# Patient Record
Sex: FEMALE | Race: BLACK OR AFRICAN AMERICAN | Employment: OTHER | URBAN - METROPOLITAN AREA
[De-identification: names, ages, dates, MRNs, and addresses within clinical notes are randomized per-mention and may not be internally consistent; named-entity substitution may affect disease eponyms.]

---

## 2017-03-06 ENCOUNTER — OP HISTORICAL/CONVERTED ENCOUNTER (OUTPATIENT)
Dept: OTHER | Age: 72
End: 2017-03-06

## 2017-04-05 ENCOUNTER — OP HISTORICAL/CONVERTED ENCOUNTER (OUTPATIENT)
Dept: OTHER | Age: 72
End: 2017-04-05

## 2017-05-01 ENCOUNTER — OP HISTORICAL/CONVERTED ENCOUNTER (OUTPATIENT)
Dept: OTHER | Age: 72
End: 2017-05-01

## 2019-03-18 ENCOUNTER — OP HISTORICAL/CONVERTED ENCOUNTER (OUTPATIENT)
Dept: OTHER | Age: 74
End: 2019-03-18

## 2019-06-21 ENCOUNTER — OP HISTORICAL/CONVERTED ENCOUNTER (OUTPATIENT)
Dept: OTHER | Age: 74
End: 2019-06-21

## 2019-09-20 ENCOUNTER — OP HISTORICAL/CONVERTED ENCOUNTER (OUTPATIENT)
Dept: OTHER | Age: 74
End: 2019-09-20

## 2019-10-16 ENCOUNTER — OP HISTORICAL/CONVERTED ENCOUNTER (OUTPATIENT)
Dept: OTHER | Age: 74
End: 2019-10-16

## 2019-10-18 ENCOUNTER — OP HISTORICAL/CONVERTED ENCOUNTER (OUTPATIENT)
Dept: OTHER | Age: 74
End: 2019-10-18

## 2020-08-18 ENCOUNTER — TELEPHONE (OUTPATIENT)
Dept: INTERNAL MEDICINE CLINIC | Age: 75
End: 2020-08-18

## 2020-08-18 DIAGNOSIS — R92.8 ABNORMAL MAMMOGRAM: Primary | ICD-10-CM

## 2020-08-18 NOTE — TELEPHONE ENCOUNTER
[facility] called requesting an order for Diag mammogram left breast. Fax number [phone].

## 2020-08-19 DIAGNOSIS — R92.8 ABNORMAL MAMMOGRAM: Primary | ICD-10-CM

## 2020-08-27 DIAGNOSIS — R92.8 ABNORMAL MAMMOGRAM: ICD-10-CM

## 2020-08-30 NOTE — PROGRESS NOTES
This result is abnormal.  Notify the patient mammography suggest a possible abnormality left breast calcifications. Unclear whether it is benign or not.   Radiologist feels she needs biopsy we can arrange that if she is agreeable for us to do that

## 2020-09-21 ENCOUNTER — OFFICE VISIT (OUTPATIENT)
Dept: INTERNAL MEDICINE CLINIC | Age: 75
End: 2020-09-21
Payer: MEDICARE

## 2020-09-21 VITALS
SYSTOLIC BLOOD PRESSURE: 153 MMHG | DIASTOLIC BLOOD PRESSURE: 88 MMHG | TEMPERATURE: 97.9 F | HEIGHT: 65 IN | HEART RATE: 78 BPM | OXYGEN SATURATION: 97 % | BODY MASS INDEX: 29.29 KG/M2 | WEIGHT: 175.8 LBS

## 2020-09-21 DIAGNOSIS — L92.8 GRANULOMA OF HAIR FOLLICLE: Primary | ICD-10-CM

## 2020-09-21 PROCEDURE — 1101F PT FALLS ASSESS-DOCD LE1/YR: CPT | Performed by: INTERNAL MEDICINE

## 2020-09-21 PROCEDURE — G8510 SCR DEP NEG, NO PLAN REQD: HCPCS | Performed by: INTERNAL MEDICINE

## 2020-09-21 PROCEDURE — 3017F COLORECTAL CA SCREEN DOC REV: CPT | Performed by: INTERNAL MEDICINE

## 2020-09-21 PROCEDURE — G8427 DOCREV CUR MEDS BY ELIG CLIN: HCPCS | Performed by: INTERNAL MEDICINE

## 2020-09-21 PROCEDURE — 99213 OFFICE O/P EST LOW 20 MIN: CPT | Performed by: INTERNAL MEDICINE

## 2020-09-21 PROCEDURE — 1090F PRES/ABSN URINE INCON ASSESS: CPT | Performed by: INTERNAL MEDICINE

## 2020-09-21 PROCEDURE — G9899 SCRN MAM PERF RSLTS DOC: HCPCS | Performed by: INTERNAL MEDICINE

## 2020-09-21 PROCEDURE — G0444 DEPRESSION SCREEN ANNUAL: HCPCS | Performed by: INTERNAL MEDICINE

## 2020-09-21 PROCEDURE — G8536 NO DOC ELDER MAL SCRN: HCPCS | Performed by: INTERNAL MEDICINE

## 2020-09-21 PROCEDURE — G8419 CALC BMI OUT NRM PARAM NOF/U: HCPCS | Performed by: INTERNAL MEDICINE

## 2020-09-21 PROCEDURE — G8400 PT W/DXA NO RESULTS DOC: HCPCS | Performed by: INTERNAL MEDICINE

## 2020-09-21 RX ORDER — MONTELUKAST SODIUM 10 MG/1
10 TABLET ORAL DAILY
COMMUNITY
End: 2021-08-27

## 2020-09-21 RX ORDER — BISMUTH SUBSALICYLATE 262 MG
1 TABLET,CHEWABLE ORAL DAILY
COMMUNITY
End: 2020-10-15

## 2020-09-21 NOTE — PROGRESS NOTES
Chief Complaint   Patient presents with    Wound Check     Pt states that she needs an area to be checked on  Her right lower arm

## 2020-09-21 NOTE — PROGRESS NOTES
Yanely Terrazas is a 69 y.o. female and presents with Wound Check    She says she noticed a small pinpoint lesion in her right arm months ago and they started trying to pull it out 2 weeks ago, and it started growing and becoming injury. She denies any secretion, denies changes in the color of the skin. Review of Systems  Review of Systems   Constitutional: Negative for chills, fatigue, fever and unexpected weight change. HENT: Negative for congestion, ear pain, sneezing and sore throat. Eyes: Negative for pain and discharge. Respiratory: Negative for cough, shortness of breath and wheezing. Cardiovascular: Negative for chest pain, palpitations and leg swelling. Gastrointestinal: Negative for abdominal pain, blood in stool, constipation and diarrhea. Endocrine: Negative for polydipsia and polyuria. Genitourinary: Negative for difficulty urinating, dysuria, frequency, hematuria and urgency. Musculoskeletal: Negative for arthralgias, back pain and joint swelling. Skin: Negative for rash. Allergic/Immunologic: Negative for environmental allergies and food allergies. Neurological: Negative for dizziness, speech difficulty, weakness, light-headedness, numbness and headaches. Hematological: Negative for adenopathy. Psychiatric/Behavioral: Negative for behavioral problems (Depression), sleep disturbance and suicidal ideas.           Past Medical History:   Diagnosis Date    Arthritis      Past Surgical History:   Procedure Laterality Date    HX GYN      hysterectomy    HX ORTHOPAEDIC      knee     Social History     Socioeconomic History    Marital status:      Spouse name: Not on file    Number of children: Not on file    Years of education: Not on file    Highest education level: Not on file   Tobacco Use    Smoking status: Never Smoker    Smokeless tobacco: Never Used   Substance and Sexual Activity    Alcohol use: Never     Frequency: Never    Drug use: Never     Family History   Problem Relation Age of Onset    Cancer Mother     Cancer Father      Current Outpatient Medications   Medication Sig Dispense Refill    multivitamin (ONE A DAY) tablet Take 1 Tab by mouth daily.  montelukast (SINGULAIR) 10 mg tablet Take 10 mg by mouth daily. Not on File    Objective:  Visit Vitals  BP (!) 153/88 (BP 1 Location: Left arm, BP Patient Position: Sitting)   Pulse 78   Temp 97.9 °F (36.6 °C) (Oral)   Ht 5' 5\" (1.651 m)   Wt 175 lb 12.8 oz (79.7 kg)   SpO2 97% Comment: RA   BMI 29.25 kg/m²     Physical Exam:   Physical Exam  Constitutional:       General: She is not in acute distress. Appearance: Normal appearance. HENT:      Head: Normocephalic and atraumatic. Mouth/Throat:      Mouth: Mucous membranes are moist.   Eyes:      Extraocular Movements: Extraocular movements intact. Conjunctiva/sclera: Conjunctivae normal.      Pupils: Pupils are equal, round, and reactive to light. Neck:      Musculoskeletal: Normal range of motion and neck supple. Cardiovascular:      Rate and Rhythm: Normal rate and regular rhythm. Heart sounds: Normal heart sounds. Pulmonary:      Effort: Pulmonary effort is normal.      Breath sounds: Normal breath sounds. Musculoskeletal:         General: No swelling or deformity. Right lower leg: No edema. Left lower leg: No edema. Lymphadenopathy:      Cervical: No cervical adenopathy. Skin:     General: Skin is warm and dry. Capillary Refill: Capillary refill takes less than 2 seconds. Coloration: Skin is not jaundiced or pale. Findings: Lesion (Indurated papule in the right forearm of around 1 cm diameter, eupigmented) present. No erythema or rash. Neurological:      General: No focal deficit present. Mental Status: She is alert and oriented to person, place, and time. Psychiatric:         Mood and Affect: Mood normal.         Behavior: Behavior normal.         Thought Content:  Thought content normal.         Judgment: Judgment normal.          No results found for this or any previous visit. Assessment/Plan:    The lesion she has looks like a granuloma, I explained her disease scar tissue forming, chances that that granuloma will stay there or it could going its own after a few weeks, I gave her the option to see dermatology to see if she can have it removed if it's bothering her. ICD-10-CM ICD-9-CM    1. Granuloma of hair follicle  J57.1 910.4 REFERRAL TO DERMATOLOGY     Orders Placed This Encounter    REFERRAL TO DERMATOLOGY     Referral Priority:   Routine     Referral Type:   Consultation     Referral Reason:   Specialty Services Required     Referred to Provider:   Rupert Joseph DO     Requested Specialty:   Dermatology     Number of Visits Requested:   1    multivitamin (ONE A DAY) tablet     Sig: Take 1 Tab by mouth daily.  montelukast (SINGULAIR) 10 mg tablet     Sig: Take 10 mg by mouth daily. There are no Patient Instructions on file for this visit.

## 2020-09-25 ENCOUNTER — OFFICE VISIT (OUTPATIENT)
Dept: SURGERY | Age: 75
End: 2020-09-25
Payer: MEDICARE

## 2020-09-25 VITALS
HEIGHT: 65 IN | TEMPERATURE: 97.5 F | RESPIRATION RATE: 16 BRPM | WEIGHT: 177.6 LBS | DIASTOLIC BLOOD PRESSURE: 83 MMHG | BODY MASS INDEX: 29.59 KG/M2 | SYSTOLIC BLOOD PRESSURE: 160 MMHG | HEART RATE: 87 BPM

## 2020-09-25 DIAGNOSIS — R92.8 ABNORMAL MAMMOGRAPHY: Primary | ICD-10-CM

## 2020-09-25 PROCEDURE — 99204 OFFICE O/P NEW MOD 45 MIN: CPT | Performed by: SURGERY

## 2020-09-25 RX ORDER — CALCIUM/MAGNESIUM/ZINC 333-133 MG
400 TABLET ORAL DAILY
COMMUNITY

## 2020-09-25 RX ORDER — CALCIUM CARBONATE/VITAMIN D3 600 MG-125
600 TABLET ORAL DAILY
COMMUNITY

## 2020-09-25 RX ORDER — ZINC GLUCONATE 10 MG
250 LOZENGE ORAL DAILY
COMMUNITY

## 2020-09-25 NOTE — PROGRESS NOTES
1. Have you been to the ER, urgent care clinic since your last visit? Hospitalized since your last visit? No    2. Have you seen or consulted any other health care providers outside of the [address] [place] [address] since your last visit? Include any pap smears or colon screening.  No         New patient with previous stereotactic biopsy left breast.

## 2020-09-25 NOTE — LETTER
9/26/20 Patient: Eual Body YOB: 1945 Date of Visit: 9/25/2020 Jovany Hathaway MD 
[address] [facility] [phone] VIA In Basket Dear Jovany Hathaway MD, Thank you for referring Ms. Dianelys Miles [facility] [address] [facility]. My notes for this consultation are attached. If you have questions, please do not hesitate to call me. I look forward to following your patient along with you. Sincerely, Angela Morris MD

## 2020-09-26 NOTE — PROGRESS NOTES
This is the first [address] [place] [address] visit for this 69y.o.-year-old woman who presents with left abnormal mammogram.      HISTORY OF PRESENT ILLNESS: Ms Chel Middleton is a 69y.o.-year-old woman who presents for left abnormal mammogram. Underwent left breast biopsy demonstrating papillomatosis, fibrocystic change and fibroadenoma. She denies any palpable masses, nipple discharge, skin changes, or axillary adenopathy. She denies breast pain. She does not have any significant past history for breast diseases or breast biopsy. Breast cancer risk factors:  Menarche at age 16    Age at first live birth: 23  postmenopausal.  OCP use: admits to  HRT use: admits to  Infertility treatment:  denies    FAMILY HISTORY:  Maternal aunt w breast cancer in her 46s  Maternal aunt w breast cancer in her 46s  Paternal cousin w ? Gynecological cancer  Maternal cousin w prostate cancer    Past Medical History:   Diagnosis Date    Arthritis          Past Surgical History:   Procedure Laterality Date    HX GYN      hysterectomy    HX ORTHOPAEDIC      knee         Social History     Socioeconomic History    Marital status:      Spouse name: Not on file    Number of children: Not on file    Years of education: Not on file    Highest education level: Not on file   Tobacco Use    Smoking status: Never Smoker    Smokeless tobacco: Never Used   Substance and Sexual Activity    Alcohol use: Never     Frequency: Never    Drug use: Never         Not on File      Current Outpatient Medications   Medication Sig Dispense Refill    calcium-cholecalciferol, d3, (CALCIUM 600 + D) 600-125 mg-unit tab Take  by mouth.  echinacea 400 mg cap Take  by mouth.  magnesium 250 mg tab Take  by mouth.  multivitamin (ONE A DAY) tablet Take 1 Tab by mouth daily.  montelukast (SINGULAIR) 10 mg tablet Take 10 mg by mouth daily.            REVIEW OF SYSTEMS:  General: normal, no unintentional weight loss  HEENT: normal, no lymphadenopathy  Cardiovascular: normal, no chest pain  Respiratory: no cough, shortness of breath, or wheezing  BREAST: no complaints  GI: normal, no blood in urine or stool  : normal, no hematuria  Musculoskeletal: no back pain  Integumentary: no abnormal skin lesions  Neuro: no memory changes, dizziness, loss of consciousness  Psych: no mood disorders, feels safe in a relationship      PHYSICAL EXAM:  Patient is a 69y.o.-year-old woman  General Appearance: healthy-appearing, no acute distress, ambulating normally  Head: normocephalic  Neck: supple and no masses  Lymph Nodes: no cervical LAD, supraclavicular LAD, or axillary LAD    BREASTS: symmetric  RIGHT BREAST: no erythema, induration, tenderness, ecchymosis, skin changes, abnormal secretions, or axillary lymphadenopathy and normal nipple appearance, no masses  LEFT BREAST: no erythema, induration, tenderness, ecchymosis, skin changes, abnormal secretions, or axillary lymphadenopathy and normal nipple appearance, no masses    Lungs: no dyspnea  Back: normal curvature  Abdomen: soft and no tenderness, no hepatomegaly, no splenomegaly  Skin: no jaundice  Musculoskeletal: Extremities w no edema, normal motor strength, normal movement of all extremities  Neurologic: Cranial Nerves: grossly intact. Sensation: grossly intact  Psychiatric: good judgement and insight, active and alert and normal mood      RADIOLOGIC WORK-UP: Radiology films and reports were reviewed.  8.11.2020, heterogeneously dense breasts, cluster of calcifications in the left breast, BIRADS-4      PATHOLOGY: Review of pathology shows 8.26.2020, left breast stereotactic biopsy w intraductal papillomatosis w microcalcifications, fibrocystic change, fibroadenoma      IMPRESSION:  69y.o.-year old woman with left abnormal mammogram    DISCUSSION AND PLAN:  I discussed the results of the physical examination and imaging and biopsy results with the patient and explained that the findings are suspicious, are currently of unknown etiology with unknown diagnosis, with an approximately 10% chance of upgrade to an invasive cancer, and that she would need further work-up, which includes an excisional biopsy to obtain pathology. She understands and will discuss this w her . She will call us to inform us of her decision for surgery or observation      She was advised to call the office w any questions or concerns. All questions were answered to her satisfaction. This encounter lasted 45 minutes, and over 50% of this visit was spent in counseling the patient and coordination of care.

## 2020-09-28 ENCOUNTER — TELEPHONE (OUTPATIENT)
Dept: SURGERY | Age: 75
End: 2020-09-28

## 2020-10-16 ENCOUNTER — TRANSCRIBE ORDER (OUTPATIENT)
Dept: SCHEDULING | Age: 75
End: 2020-10-16

## 2020-10-16 DIAGNOSIS — R92.8 ABNORMAL MAMMOGRAM: Primary | ICD-10-CM

## 2020-10-24 ENCOUNTER — HOSPITAL ENCOUNTER (OUTPATIENT)
Dept: PREADMISSION TESTING | Age: 75
Discharge: HOME | End: 2020-10-24
Payer: MEDICARE

## 2020-10-24 LAB — SARS-COV-2, COV2: NORMAL

## 2020-10-24 PROCEDURE — 87635 SARS-COV-2 COVID-19 AMP PRB: CPT

## 2020-10-26 LAB — SARS-COV-2, COV2NT: NOT DETECTED

## 2020-10-28 ENCOUNTER — HOSPITAL ENCOUNTER (OUTPATIENT)
Age: 75
Discharge: HOME | End: 2020-10-28
Attending: SURGERY | Admitting: SURGERY
Payer: MEDICARE

## 2020-10-28 ENCOUNTER — ANESTHESIA (OUTPATIENT)
Dept: SURGERY | Age: 75
End: 2020-10-28
Payer: MEDICARE

## 2020-10-28 ENCOUNTER — HOSPITAL ENCOUNTER (OUTPATIENT)
Dept: MAMMOGRAPHY | Age: 75
Discharge: HOME | End: 2020-10-28
Attending: SURGERY
Payer: MEDICARE

## 2020-10-28 ENCOUNTER — ANESTHESIA EVENT (OUTPATIENT)
Dept: SURGERY | Age: 75
End: 2020-10-28
Payer: MEDICARE

## 2020-10-28 VITALS
DIASTOLIC BLOOD PRESSURE: 69 MMHG | RESPIRATION RATE: 18 BRPM | HEART RATE: 89 BPM | WEIGHT: 173 LBS | TEMPERATURE: 97.4 F | BODY MASS INDEX: 28.82 KG/M2 | HEIGHT: 65 IN | OXYGEN SATURATION: 99 % | SYSTOLIC BLOOD PRESSURE: 144 MMHG

## 2020-10-28 DIAGNOSIS — R92.8 ABNORMAL MAMMOGRAM: ICD-10-CM

## 2020-10-28 DIAGNOSIS — R92.8 ABNORMAL MAMMOGRAM: Primary | ICD-10-CM

## 2020-10-28 PROCEDURE — 76210000020 HC REC RM PH II FIRST 0.5 HR: Performed by: SURGERY

## 2020-10-28 PROCEDURE — 76010000138 HC OR TIME 0.5 TO 1 HR: Performed by: SURGERY

## 2020-10-28 PROCEDURE — 77030010938 HC CLP LIG TELE -A: Performed by: SURGERY

## 2020-10-28 PROCEDURE — 77030002996 HC SUT SLK J&J -A: Performed by: SURGERY

## 2020-10-28 PROCEDURE — 76060000032 HC ANESTHESIA 0.5 TO 1 HR: Performed by: SURGERY

## 2020-10-28 PROCEDURE — 77030010509 HC AIRWY LMA MSK TELE -A: Performed by: NURSE ANESTHETIST, CERTIFIED REGISTERED

## 2020-10-28 PROCEDURE — 88307 TISSUE EXAM BY PATHOLOGIST: CPT

## 2020-10-28 PROCEDURE — 77030031139 HC SUT VCRL2 J&J -A: Performed by: SURGERY

## 2020-10-28 PROCEDURE — 76210000006 HC OR PH I REC 0.5 TO 1 HR: Performed by: SURGERY

## 2020-10-28 PROCEDURE — 74011000250 HC RX REV CODE- 250: Performed by: NURSE ANESTHETIST, CERTIFIED REGISTERED

## 2020-10-28 PROCEDURE — 2709999900 HC NON-CHARGEABLE SUPPLY: Performed by: SURGERY

## 2020-10-28 PROCEDURE — 74011000250 HC RX REV CODE- 250: Performed by: SURGERY

## 2020-10-28 PROCEDURE — 19120 REMOVAL OF BREAST LESION: CPT | Performed by: SURGERY

## 2020-10-28 PROCEDURE — 74011250636 HC RX REV CODE- 250/636: Performed by: NURSE ANESTHETIST, CERTIFIED REGISTERED

## 2020-10-28 PROCEDURE — 74011000272 HC RX REV CODE- 272: Performed by: SURGERY

## 2020-10-28 PROCEDURE — 74011250636 HC RX REV CODE- 250/636: Performed by: SURGERY

## 2020-10-28 PROCEDURE — 19281 PERQ DEVICE BREAST 1ST IMAG: CPT

## 2020-10-28 PROCEDURE — 77030018673: Performed by: SURGERY

## 2020-10-28 RX ORDER — FENTANYL CITRATE 50 UG/ML
INJECTION, SOLUTION INTRAMUSCULAR; INTRAVENOUS AS NEEDED
Status: DISCONTINUED | OUTPATIENT
Start: 2020-10-28 | End: 2020-10-28 | Stop reason: HOSPADM

## 2020-10-28 RX ORDER — SODIUM CHLORIDE 0.9 % (FLUSH) 0.9 %
5-40 SYRINGE (ML) INJECTION AS NEEDED
Status: DISCONTINUED | OUTPATIENT
Start: 2020-10-28 | End: 2020-10-28 | Stop reason: HOSPADM

## 2020-10-28 RX ORDER — LIDOCAINE HYDROCHLORIDE AND EPINEPHRINE 10; 10 MG/ML; UG/ML
INJECTION, SOLUTION INFILTRATION; PERINEURAL AS NEEDED
Status: DISCONTINUED | OUTPATIENT
Start: 2020-10-28 | End: 2020-10-28 | Stop reason: HOSPADM

## 2020-10-28 RX ORDER — EPHEDRINE SULFATE/0.9% NACL/PF 50 MG/5 ML
SYRINGE (ML) INTRAVENOUS AS NEEDED
Status: DISCONTINUED | OUTPATIENT
Start: 2020-10-28 | End: 2020-10-28 | Stop reason: HOSPADM

## 2020-10-28 RX ORDER — FENTANYL CITRATE 50 UG/ML
50 INJECTION, SOLUTION INTRAMUSCULAR; INTRAVENOUS AS NEEDED
Status: DISCONTINUED | OUTPATIENT
Start: 2020-10-28 | End: 2020-10-28 | Stop reason: HOSPADM

## 2020-10-28 RX ORDER — METOPROLOL TARTRATE 5 MG/5ML
2.5 INJECTION INTRAVENOUS
Status: DISCONTINUED | OUTPATIENT
Start: 2020-10-28 | End: 2020-10-28 | Stop reason: HOSPADM

## 2020-10-28 RX ORDER — SODIUM CHLORIDE 0.9 % (FLUSH) 0.9 %
5-40 SYRINGE (ML) INJECTION EVERY 8 HOURS
Status: DISCONTINUED | OUTPATIENT
Start: 2020-10-28 | End: 2020-10-28 | Stop reason: HOSPADM

## 2020-10-28 RX ORDER — SODIUM CHLORIDE 0.9 G/100ML
IRRIGANT IRRIGATION AS NEEDED
Status: DISCONTINUED | OUTPATIENT
Start: 2020-10-28 | End: 2020-10-28 | Stop reason: HOSPADM

## 2020-10-28 RX ORDER — MORPHINE SULFATE 10 MG/ML
2 INJECTION, SOLUTION INTRAMUSCULAR; INTRAVENOUS
Status: DISCONTINUED | OUTPATIENT
Start: 2020-10-28 | End: 2020-10-28 | Stop reason: HOSPADM

## 2020-10-28 RX ORDER — DIPHENHYDRAMINE HYDROCHLORIDE 50 MG/ML
12.5 INJECTION, SOLUTION INTRAMUSCULAR; INTRAVENOUS AS NEEDED
Status: DISCONTINUED | OUTPATIENT
Start: 2020-10-28 | End: 2020-10-28 | Stop reason: HOSPADM

## 2020-10-28 RX ORDER — ONDANSETRON 2 MG/ML
INJECTION INTRAMUSCULAR; INTRAVENOUS AS NEEDED
Status: DISCONTINUED | OUTPATIENT
Start: 2020-10-28 | End: 2020-10-28 | Stop reason: HOSPADM

## 2020-10-28 RX ORDER — FENTANYL CITRATE 50 UG/ML
50 INJECTION, SOLUTION INTRAMUSCULAR; INTRAVENOUS
Status: DISCONTINUED | OUTPATIENT
Start: 2020-10-28 | End: 2020-10-28 | Stop reason: HOSPADM

## 2020-10-28 RX ORDER — ONDANSETRON 2 MG/ML
4 INJECTION INTRAMUSCULAR; INTRAVENOUS AS NEEDED
Status: DISCONTINUED | OUTPATIENT
Start: 2020-10-28 | End: 2020-10-28 | Stop reason: HOSPADM

## 2020-10-28 RX ORDER — MIDAZOLAM HYDROCHLORIDE 1 MG/ML
1 INJECTION, SOLUTION INTRAMUSCULAR; INTRAVENOUS AS NEEDED
Status: DISCONTINUED | OUTPATIENT
Start: 2020-10-28 | End: 2020-10-28 | Stop reason: HOSPADM

## 2020-10-28 RX ORDER — BUPIVACAINE HYDROCHLORIDE 2.5 MG/ML
INJECTION, SOLUTION EPIDURAL; INFILTRATION; INTRACAUDAL AS NEEDED
Status: DISCONTINUED | OUTPATIENT
Start: 2020-10-28 | End: 2020-10-28 | Stop reason: HOSPADM

## 2020-10-28 RX ORDER — PROPOFOL 10 MG/ML
INJECTION, EMULSION INTRAVENOUS AS NEEDED
Status: DISCONTINUED | OUTPATIENT
Start: 2020-10-28 | End: 2020-10-28 | Stop reason: HOSPADM

## 2020-10-28 RX ORDER — LIDOCAINE HYDROCHLORIDE 10 MG/ML
0.1 INJECTION, SOLUTION EPIDURAL; INFILTRATION; INTRACAUDAL; PERINEURAL AS NEEDED
Status: DISCONTINUED | OUTPATIENT
Start: 2020-10-28 | End: 2020-10-28 | Stop reason: HOSPADM

## 2020-10-28 RX ORDER — LIDOCAINE HYDROCHLORIDE 20 MG/ML
INJECTION, SOLUTION EPIDURAL; INFILTRATION; INTRACAUDAL; PERINEURAL AS NEEDED
Status: DISCONTINUED | OUTPATIENT
Start: 2020-10-28 | End: 2020-10-28 | Stop reason: HOSPADM

## 2020-10-28 RX ORDER — CEFAZOLIN SODIUM 2 G/100ML
2 INJECTION, SOLUTION INTRAVENOUS ONCE
Status: DISCONTINUED | OUTPATIENT
Start: 2020-10-28 | End: 2020-10-28 | Stop reason: HOSPADM

## 2020-10-28 RX ORDER — CEFAZOLIN SODIUM 1 G/3ML
INJECTION, POWDER, FOR SOLUTION INTRAMUSCULAR; INTRAVENOUS AS NEEDED
Status: DISCONTINUED | OUTPATIENT
Start: 2020-10-28 | End: 2020-10-28 | Stop reason: HOSPADM

## 2020-10-28 RX ORDER — HYDRALAZINE HYDROCHLORIDE 20 MG/ML
10 INJECTION INTRAMUSCULAR; INTRAVENOUS
Status: DISCONTINUED | OUTPATIENT
Start: 2020-10-28 | End: 2020-10-28 | Stop reason: HOSPADM

## 2020-10-28 RX ORDER — HYDROMORPHONE HYDROCHLORIDE 1 MG/ML
0.4 INJECTION, SOLUTION INTRAMUSCULAR; INTRAVENOUS; SUBCUTANEOUS
Status: DISCONTINUED | OUTPATIENT
Start: 2020-10-28 | End: 2020-10-28 | Stop reason: HOSPADM

## 2020-10-28 RX ORDER — OXYCODONE HYDROCHLORIDE 5 MG/1
5 TABLET ORAL
Qty: 12 TAB | Refills: 0 | Status: SHIPPED | OUTPATIENT
Start: 2020-10-28 | End: 2020-10-31

## 2020-10-28 RX ORDER — DEXAMETHASONE SODIUM PHOSPHATE 4 MG/ML
INJECTION, SOLUTION INTRA-ARTICULAR; INTRALESIONAL; INTRAMUSCULAR; INTRAVENOUS; SOFT TISSUE AS NEEDED
Status: DISCONTINUED | OUTPATIENT
Start: 2020-10-28 | End: 2020-10-28 | Stop reason: HOSPADM

## 2020-10-28 RX ORDER — SODIUM CHLORIDE, SODIUM LACTATE, POTASSIUM CHLORIDE, CALCIUM CHLORIDE 600; 310; 30; 20 MG/100ML; MG/100ML; MG/100ML; MG/100ML
20 INJECTION, SOLUTION INTRAVENOUS CONTINUOUS
Status: DISCONTINUED | OUTPATIENT
Start: 2020-10-28 | End: 2020-10-28 | Stop reason: HOSPADM

## 2020-10-28 RX ORDER — HYDROCODONE BITARTRATE AND ACETAMINOPHEN 5; 325 MG/1; MG/1
1 TABLET ORAL AS NEEDED
Status: DISCONTINUED | OUTPATIENT
Start: 2020-10-28 | End: 2020-10-28 | Stop reason: HOSPADM

## 2020-10-28 RX ORDER — LABETALOL HCL 20 MG/4 ML
5 SYRINGE (ML) INTRAVENOUS
Status: DISCONTINUED | OUTPATIENT
Start: 2020-10-28 | End: 2020-10-28 | Stop reason: HOSPADM

## 2020-10-28 RX ORDER — EPHEDRINE SULFATE/0.9% NACL/PF 50 MG/5 ML
5 SYRINGE (ML) INTRAVENOUS AS NEEDED
Status: DISCONTINUED | OUTPATIENT
Start: 2020-10-28 | End: 2020-10-28 | Stop reason: HOSPADM

## 2020-10-28 RX ORDER — MIDAZOLAM HYDROCHLORIDE 1 MG/ML
0.5 INJECTION, SOLUTION INTRAMUSCULAR; INTRAVENOUS
Status: DISCONTINUED | OUTPATIENT
Start: 2020-10-28 | End: 2020-10-28 | Stop reason: HOSPADM

## 2020-10-28 RX ORDER — LIDOCAINE HYDROCHLORIDE AND EPINEPHRINE 10; 10 MG/ML; UG/ML
15 INJECTION, SOLUTION INFILTRATION; PERINEURAL
Status: DISPENSED | OUTPATIENT
Start: 2020-10-28 | End: 2020-10-28

## 2020-10-28 RX ADMIN — LIDOCAINE HYDROCHLORIDE 80 MG: 20 INJECTION, SOLUTION EPIDURAL; INFILTRATION; INTRACAUDAL; PERINEURAL at 11:32

## 2020-10-28 RX ADMIN — FENTANYL CITRATE 50 MCG: 50 INJECTION, SOLUTION INTRAMUSCULAR; INTRAVENOUS at 12:16

## 2020-10-28 RX ADMIN — DEXAMETHASONE SODIUM PHOSPHATE 4 MG: 4 INJECTION, SOLUTION INTRA-ARTICULAR; INTRALESIONAL; INTRAMUSCULAR; INTRAVENOUS; SOFT TISSUE at 11:38

## 2020-10-28 RX ADMIN — Medication 10 MG: at 11:44

## 2020-10-28 RX ADMIN — PROPOFOL 200 MG: 10 INJECTION, EMULSION INTRAVENOUS at 11:32

## 2020-10-28 RX ADMIN — FENTANYL CITRATE 50 MCG: 50 INJECTION, SOLUTION INTRAMUSCULAR; INTRAVENOUS at 11:32

## 2020-10-28 RX ADMIN — ONDANSETRON 4 MG: 2 INJECTION INTRAMUSCULAR; INTRAVENOUS at 11:38

## 2020-10-28 RX ADMIN — CEFAZOLIN SODIUM 2 G: 1 INJECTION, POWDER, FOR SOLUTION INTRAMUSCULAR; INTRAVENOUS at 11:39

## 2020-10-28 RX ADMIN — SODIUM CHLORIDE, POTASSIUM CHLORIDE, SODIUM LACTATE AND CALCIUM CHLORIDE: 600; 310; 30; 20 INJECTION, SOLUTION INTRAVENOUS at 11:07

## 2020-10-28 NOTE — DISCHARGE INSTRUCTIONS
Patient Education        Core Needle Breast Biopsy: About This Test  What is it? A core needle breast biopsy removes samples of breast tissue using a needle with a special tip. The samples are looked at under a microscope to check for cancer cells. Why is this test done? A core needle breast biopsy is most often done to check a lump found during a breast exam or a suspicious area found on a mammogram or other imaging. How do you prepare for the test?  If you take aspirin or some other blood thinner, ask your doctor if you should stop taking it before your test. Make sure that you understand exactly what your doctor wants you to do. These medicines increase the risk of bleeding. How is the test done? · You may sit in a chair or lie face up on a table. Or you may lie on your stomach on a table that has a hole for your breast to hang through. · When the area in your breast is numb, a small cut (incision) is made in the skin. · Using imaging, the doctor will guide the needle into the biopsy area. · The doctor will take several samples of breast tissue. This may be done with the needle or with a probe that uses a gentle vacuum to remove the samples. · A small clip is usually inserted into your breast to bry the biopsy site. · The needle is removed and pressure is put on the needle site to stop any bleeding. · A bandage is put on the needle site. How long does the test take? The test may take 15 minutes to 60 minutes, depending on how the procedure is done. What happens after the test?  · You'll be told how long it may take to get your results back. · You will probably be able to go home right away. · After a specialist looks at the biopsy sample for signs of cancer, your doctor's office will let you know the results. · If the test results aren't clear, you may have another biopsy or test.  How can you care for yourself at home? · You can go back to your usual activities right away.  But avoid heavy lifting for 24 hours. · The site may be tender for 2 or 3 days. You may also have some bruising, swelling, or slight bleeding. ? You can use an ice pack. Put ice or a cold pack on the area for 10 to 20 minutes at a time. Put a thin cloth between the ice and your skin. ? Ask your doctor if you can take an over-the-counter pain medicine, such as acetaminophen (Tylenol), ibuprofen (Advil, Motrin), or naproxen (Aleve). Be safe with medicines. Read and follow all instructions on the label. Follow-up care is a key part of your treatment and safety. Be sure to make and go to all appointments, and call your doctor if you are having problems. It's also a good idea to keep a list of the medicines you take. Ask your doctor when you can expect to have your test results. Where can you learn more? Go to http://www.gray.com/  Enter Y286 in the search box to learn more about \"Core Needle Breast Biopsy: About This Test.\"  Current as of: April 29, 2020               Content Version: 12.6  © 8662-9957 [employer]. Care instructions adapted under license by [employer] (which disclaims liability or warranty for this information). If you have questions about a medical condition or this instruction, always ask your healthcare professional. [place] [address] any warranty or liability for your use of this information. Patient Education   Patient Education        Infection After Surgery: Care Instructions  Your Care Instructions  After surgery, an infection is always possible. It doesn't mean that the surgery didn't go well. Because an infection can be serious, your doctor has taken steps to manage it. Your doctor checked the infection and cleaned it if necessary. He or she may have made an opening in the area so that the pus can drain out. You may have gauze in the cut so that the area will stay open and keep draining. You may need antibiotics.   You will need to follow up with your doctor to make sure the infection has gone away. Follow-up care is a key part of your treatment and safety. Be sure to make and go to all appointments, and call your doctor if you are having problems. It's also a good idea to know your test results and keep a list of the medicines you take. How can you care for yourself at home? · Make sure your surgeon knows that you saw a doctor about the infection. · If your doctor prescribed antibiotics, take them as directed. Do not stop taking them just because you feel better. You need to take the full course of antibiotics. · Ask your doctor if you can take an over-the-counter pain medicine, such as acetaminophen (Tylenol), ibuprofen (Advil, Motrin), or naproxen (Aleve). Be safe with medicines. Read and follow all instructions on the label. · Do not take two or more pain medicines at the same time unless the doctor told you to. Many pain medicines have acetaminophen, which is Tylenol. Too much acetaminophen (Tylenol) can be harmful. · Prop up the area on a pillow anytime you sit or lie down during the next 3 days. Try to keep it above the level of your heart. This will help reduce swelling. · Keep the skin clean and dry. · If you have a bandage, keep it clean and dry. · You may have a dressing over the cut (incision). A dressing helps the incision heal and protects it. Your doctor will tell you how to take care of this. You can expect drainage from the wound. · If your doctor told you how to care for your incision, follow your doctor's instructions. If you did not get instructions, follow this general advice:  ? Wash around the incision with clean water 2 times a day. Don't use hydrogen peroxide or alcohol, which can slow healing. When should you call for help?    Call your doctor now or seek immediate medical care if:    · You have signs that your infection is getting worse, such as:  ? Increased pain, swelling, warmth, or redness in the area.  ? Red streaks leading from the area. ? Pus draining from the wound. ? A new or higher fever. Watch closely for changes in your health, and be sure to contact your doctor if you have any problems. Where can you learn more? Go to http://www.gray.com/  Enter C340 in the search box to learn more about \"Infection After Surgery: Care Instructions. \"  Current as of: June 26, 2019               Content Version: 12.6  © 3220-2508 [employer]. Care instructions adapted under license by [employer] (which disclaims liability or warranty for this information). If you have questions about a medical condition or this instruction, always ask your healthcare professional. [address] any warranty or liability for your use of this information. Learning About Anesthesia  What is anesthesia? Anesthesia controls pain. And it keeps all your organs working normally during surgery or another kind of procedure. Anesthesia can relax you. It can also make you sleepy or forgetful. Or it may make you unconscious. It depends on what kind you get. Your anesthesia provider (anesthesiologist or nurse anesthetist) will make sure you are comfortable and safe during the procedure or surgery. There are different types of anesthesia. · Local anesthesia. This type numbs a small part of the body. Doctors use it for simple procedures. ? You get a shot in the area the doctor will work on.  ? You will feel some pressure during the procedure. ? You may stay awake. Or you may get medicine to help you relax or sleep. · Regional anesthesia. This type blocks pain to a larger area of the body. It can also help relieve pain right after surgery. And it may reduce your need for other pain medicine after surgery. There are different types. They include:  ? Peripheral nerve block. This is a shot near a specific nerve or group of nerves.  It blocks pain in the part of the body supplied by the nerve. This is often used for procedures on the hands, arms, feet, legs, or face. ? Epidural and spinal anesthesia. This is a shot near the spinal cord and the nerves around it. It blocks pain from an entire area of the body, such as the belly, hips, or legs. · General anesthesia. This type affects the brain and the whole body. You may get it through a small tube placed in a vein (IV). Or you may breathe it in. You are unconscious and will not feel pain. During the surgery, you will be comfortable. Later, you will not remember much about the surgery. What type will you have? The type of anesthesia you have depends on many things, such as:  · The type of surgery or procedure and the reason you are having it. · Test results, such as blood tests. · How worried you feel about the surgery. · Your health. Your doctor and nurses will ask you about any past surgeries. They will ask about any health problems you may have, such as diabetes, lung or heart disease, or a history of stroke. They will want to know if you take medicine, such as blood thinners. Your doctor may also ask if any family members have had any problems with anesthesia. You will talk with your anesthesia provider about your options. In many cases, you may be able to choose the type of anesthesia you have. What are the risks of anesthesia? Major side effects are not common. But all types of anesthesia have some risk. Your risk depends on your overall health. It also depends on the type of anesthesia you have and how you respond to it. Serious but rare risks include breathing problems, heart attack, stroke, and reaction to the medicine. Some health conditions increase the risk of problems. Your anesthesia provider will find out about any health problems you have that may affect your care. Your anesthesia provider will closely watch your vital signs during anesthesia and surgery.  This includes checking your blood pressure and heart rate. This may help you avoid problems from anesthesia. What can you do to prepare? You will get a list of instructions to help you prepare. Your doctor will let you know what to expect when you get to the hospital, during the surgery, and after. You will get instructions about when to stop eating and drinking. If you take medicine, you will get instructions about what you can and can't take before surgery. You will be asked to sign a consent form that says you understand the risks of anesthesia. Before you do, your anesthesia provider will talk with you about the best type for you and the risks and benefits of that type. Many people are nervous before they have anesthesia and surgery. Ask your doctor about ways to relax before surgery. These may include relaxation exercises or medicine. What can you expect after having anesthesia? Right after the surgery, you will be in the recovery room. Nurses will make sure you are comfortable. As the anesthesia wears off, you may feel some pain and discomfort from your surgery. Tell someone if you have pain. Pain medicine works better if you take it before the pain gets bad. You may feel some of the effects of anesthesia for a while. It takes time for the effects of the medicine to completely wear off. · If you had local or regional anesthesia you may feel numb and have less feeling in part of your body. It may also take a few hours for you to be able to move and control your muscles as usual.  · When you first wake up from general anesthesia, you may be confused. Or it may be hard to think clearly. This is normal.  · Don't do anything for 24 hours that requires attention to detail. This includes going to work, making important decisions, or signing any legal documents. Other common side effects of anesthesia include:  · Nausea and vomiting. This does not usually last long. It can be treated with medicine. · A slight drop in body temperature.  You may feel cold and shiver when you first wake up. · A sore throat, if you had general anesthesia. · Muscle aches or weakness. · Feeling tired. For minor surgeries, you may go home the same day. For other surgeries you may stay in the hospital. Your doctor will check on your recovery from the anesthesia. He or she will answer any questions you may have. Follow-up care is a key part of your treatment and safety. Be sure to make and go to all appointments, and call your doctor if you are having problems. It's also a good idea to know your test results and keep a list of the medicines you take. Where can you learn more? Go to http://www.gray.com/  Enter V817 in the search box to learn more about \"Learning About Anesthesia. \"  Current as of: August 22, 2019               Content Version: 12.6  © 6620-2033 [employer]. Care instructions adapted under license by [employer] (which disclaims liability or warranty for this information). If you have questions about a medical condition or this instruction, always ask your healthcare professional. [address] any warranty or liability for your use of this information.

## 2020-10-28 NOTE — OP NOTES
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 woman w left breast abnormal mammogram.  Informed consent was obtained; patient understands procedure and possible risks    Description of procedure: The patient was brought to the operating room, placed in supine position after prior wire localization. Anesthesia was induced. Her left breast was sterilely prepped and draped. Time out was performed. Site and side of surgery was verified. A curvilinear incision was made overlying the radiographic abnormality at the areolar border; carried down through the skin using sharp dissection. Electrocautery was used to dissect a cylindrical piece of tissue from around the guidewire. Specimen x-ray confirmed we had the abnormality. Hemostasis obtained. Wound irrigated. The skin was then closed using interrupted 3-0 vicryl suture and a running 5-0 subcuticular Vicryl suture and Steri-Strips and sterile dressings applied. The patient tolerated the procedure well and transferred to the PACU in stable condition. All counts were correct. I was present andscrubbed through the entire procedure.

## 2020-10-28 NOTE — ANESTHESIA PREPROCEDURE EVALUATION
Relevant Problems   No relevant active problems       Anesthetic History   No history of anesthetic complications            Review of Systems / Medical History  Patient summary reviewed, nursing notes reviewed and pertinent labs reviewed    Pulmonary  Within defined limits                 Neuro/Psych   Within defined limits           Cardiovascular  Within defined limits                     GI/Hepatic/Renal  Within defined limits              Endo/Other        Arthritis and cancer     Other Findings   Comments: Breast cacner         Past Medical History:   Diagnosis Date    Arthritis        Past Surgical History:   Procedure Laterality Date    HX BREAST BIOPSY      HX COLONOSCOPY      HX GYN      hysterectomy    HX HERNIA REPAIR      HX HERNIA REPAIR      HX ORTHOPAEDIC      knee       Current Outpatient Medications   Medication Instructions    calcium-cholecalciferol, d3, (CALCIUM 600 + D) 600-125 mg-unit tab 600 mg, Oral, DAILY    echinacea 400 mg, Oral, DAILY    magnesium 250 mg, Oral, DAILY    montelukast (SINGULAIR) 10 mg, Oral, DAILY       Current Facility-Administered Medications   Medication Dose Route Frequency    lactated Ringers infusion  20 mL/hr IntraVENous CONTINUOUS    ceFAZolin (ANCEF) 2g in 100 mL dextrose (iso-osmotic) IVPB  2 g IntraVENous ONCE       Patient Vitals for the past 24 hrs:   Temp Pulse Resp BP SpO2   10/28/20 0717 36.7 °C (98.1 °F) 69 16 (!) 147/80 99 %       No results found for: WBC, WBCLT, HGBPOC, HGB, HGBP, HCTPOC, HCT, PHCT, RBCH, PLT, MCV, HGBEXT, HCTEXT, PLTEXT  No results found for: NA, K, CL, CO2, AGAP, GLU, BUN, CREA, BUCR, GFRAA, GFRNA, CA, GFRAA  No results found for: APTT, PTP, INR, INREXT  No results found for: GLU, GLUCPOC  Physical Exam    Airway  Mallampati: I  TM Distance: 4 - 6 cm  Neck ROM: normal range of motion   Mouth opening: Normal     Cardiovascular    Rhythm: regular  Rate: normal         Dental    Dentition: Upper partial plate and Lower partial plate     Pulmonary  Breath sounds clear to auscultation               Abdominal  GI exam deferred       Other Findings            Anesthetic Plan    ASA: 3  Anesthesia type: general          Induction: Intravenous  Anesthetic plan and risks discussed with: Patient and Family

## 2020-10-28 NOTE — PERIOP NOTES
Patient returned [facility] accompanied by transporter and transported via wheelchair to [address] [place] [address] room 03. Patient alert and oriented x 4 with respirations even and unlabored on RA with no signs of distress noted.

## 2020-10-28 NOTE — ANESTHESIA POSTPROCEDURE EVALUATION
Procedure(s):  Left Breast Wire Localized Excisional Biopsy.     general    Anesthesia Post Evaluation      Multimodal analgesia: multimodal analgesia used between 6 hours prior to anesthesia start to PACU discharge  Patient location during evaluation: PACU  Patient participation: complete - patient participated  Level of consciousness: awake  Pain score: 0  Pain management: adequate  Airway patency: patent  Anesthetic complications: no  Cardiovascular status: acceptable  Respiratory status: acceptable  Hydration status: acceptable  Post anesthesia nausea and vomiting:  controlled  Final Post Anesthesia Temperature Assessment:  Normothermia (36.0-37.5 degrees C)      INITIAL Post-op Vital signs:   Vitals Value Taken Time   /71 10/28/2020 12:38 PM   Temp 36.6 °C (97.9 °F) 10/28/2020 12:23 PM   Pulse 93 10/28/2020 12:38 PM   Resp 19 10/28/2020 12:38 PM   SpO2 98 % 10/28/2020 12:38 PM

## 2020-10-29 ENCOUNTER — TELEPHONE (OUTPATIENT)
Dept: SURGERY | Age: 75
End: 2020-10-29

## 2020-10-29 NOTE — TELEPHONE ENCOUNTER
Spoke with patient this afternoon she states pain of 4/10 but is taking her pain medications and doing well. Post-op appt. was confirmed and patient will call with any concerns.

## 2020-11-05 ENCOUNTER — OFFICE VISIT (OUTPATIENT)
Dept: SURGERY | Age: 75
End: 2020-11-05
Payer: MEDICARE

## 2020-11-05 VITALS
HEIGHT: 65 IN | SYSTOLIC BLOOD PRESSURE: 135 MMHG | HEART RATE: 80 BPM | TEMPERATURE: 97.1 F | DIASTOLIC BLOOD PRESSURE: 76 MMHG | RESPIRATION RATE: 16 BRPM | WEIGHT: 174.8 LBS | BODY MASS INDEX: 29.12 KG/M2

## 2020-11-05 DIAGNOSIS — Z12.31 SCREENING MAMMOGRAM, ENCOUNTER FOR: ICD-10-CM

## 2020-11-05 DIAGNOSIS — R92.8 ABNORMAL MAMMOGRAPHY: Primary | ICD-10-CM

## 2020-11-05 PROCEDURE — 99024 POSTOP FOLLOW-UP VISIT: CPT | Performed by: SURGERY

## 2020-11-05 NOTE — LETTER
11/15/20 Patient: Jamaica Conn YOB: 1945 Date of Visit: 11/5/2020 Jovany Hathaway MD 
[address] [facility] [phone] VIA In Basket Dear Jovany Hathaway MD, Thank you for referring Ms. Luis F Rashid to [address] for evaluation. My notes for this consultation are attached. If you have questions, please do not hesitate to call me. I look forward to following your patient along with you. Sincerely, Benjie Aguilar MD

## 2020-11-05 NOTE — PROGRESS NOTES
"Dermatology Rooming Note    Shena Hartmann's goals for this visit include:   Chief Complaint   Patient presents with     Skin Check     Shena is here for skin check, concern about \"Left ear feels different .\"     Claudette Valencia, [facility]    " 1. Have you been to the ER, urgent care clinic since your last visit? Hospitalized since your last visit? No    2. Have you seen or consulted any other health care providers outside of the [address] [place] [address] since your last visit? Include any pap smears or colon screening. No       Patient S/P Left Breast Excisional Biopsy.

## 2020-11-16 NOTE — PROGRESS NOTES
DIAGNOSIS: left breast radial scar    SURGERY: left breast wire localized excisional biopsy, 10.28.2020    HPI:  Patient is doing well. Denies fevers, chills. Pain very well controlled. Denies nausea, vomiting. No problems with the incision site. No redness, drainage. PHYSICAL EXAM:  General: Alert, oriented  Breast: incision healing well. No erythema, induration, drainage, seroma  Extremities: normal range of motion      SURGICAL PATHOLOGY:  10.28.2020 Left breast benign tissue, radial scar      STATUS: 69y.o.-year old woman with left abnormal mammogram, radial scar    PLAN:  I discussed the pathology with the patient and advised her that the results are benign. There are no additional diagnostic or therapeutic interventions indicated at this time. She is doing well postprocedurally. I recommended that she continue usual care with her regular physician, and return to see me for re-evaluation at the time of her next mammogram.    She was advised to call the office w any questions or concerns. All questions were answered to her satisfaction. This encounter lasted 20 minutes, and over 50% of this visit was spent in counseling the patient and coordination of care.    __________  Menarche at age 16    Age at first live birth: 23  postmenopausal.  OCP use: admits to  HRT use: admits to  Infertility treatment:  denies     FAMILY HISTORY:  Maternal aunt w breast cancer in her 46s  Maternal aunt w breast cancer in her 46s  Paternal cousin w ?  Gynecological cancer  Maternal cousin w prostate cancer    Past Medical History:   Diagnosis Date    Arthritis          Past Surgical History:   Procedure Laterality Date    HX BREAST BIOPSY      HX BREAST BIOPSY Left 10/28/2020    Left Breast Wire Localized Excisional Biopsy performed by Mayra Omalley MD at [address] [place] [address] HX COLONOSCOPY      HX GYN      hysterectomy    HX HERNIA REPAIR      HX HERNIA REPAIR      HX ORTHOPAEDIC      knee         Social History Socioeconomic History    Marital status:      Spouse name: Not on file    Number of children: Not on file    Years of education: Not on file    Highest education level: Not on file   Tobacco Use    Smoking status: Never Smoker    Smokeless tobacco: Never Used   Substance and Sexual Activity    Alcohol use: Yes     Alcohol/week: 1.0 standard drinks     Types: 1 Glasses of wine per week     Frequency: Never    Drug use: Never         No Known Allergies      Current Outpatient Medications   Medication Sig Dispense Refill    calcium-cholecalciferol, d3, (CALCIUM 600 + D) 600-125 mg-unit tab Take 600 mg by mouth daily.  echinacea 400 mg cap Take 400 mg by mouth daily.  magnesium 250 mg tab Take 250 mg by mouth daily.  montelukast (SINGULAIR) 10 mg tablet Take 10 mg by mouth daily.

## 2021-01-03 ENCOUNTER — HOSPITAL ENCOUNTER (OUTPATIENT)
Dept: PREADMISSION TESTING | Age: 76
Discharge: HOME | End: 2021-01-03
Payer: MEDICARE

## 2021-01-03 LAB — SARS-COV-2, COV2: NORMAL

## 2021-01-03 PROCEDURE — 87635 SARS-COV-2 COVID-19 AMP PRB: CPT

## 2021-01-04 LAB — SARS-COV-2, COV2NT: NOT DETECTED

## 2021-01-07 ENCOUNTER — APPOINTMENT (OUTPATIENT)
Dept: ENDOSCOPY | Age: 76
End: 2021-01-07
Attending: INTERNAL MEDICINE
Payer: MEDICARE

## 2021-01-07 ENCOUNTER — ANESTHESIA EVENT (OUTPATIENT)
Dept: ENDOSCOPY | Age: 76
End: 2021-01-07
Payer: MEDICARE

## 2021-01-07 ENCOUNTER — HOSPITAL ENCOUNTER (OUTPATIENT)
Age: 76
Setting detail: OUTPATIENT SURGERY
Discharge: HOME | End: 2021-01-07
Attending: INTERNAL MEDICINE | Admitting: INTERNAL MEDICINE
Payer: MEDICARE

## 2021-01-07 ENCOUNTER — ANESTHESIA (OUTPATIENT)
Dept: ENDOSCOPY | Age: 76
End: 2021-01-07
Payer: MEDICARE

## 2021-01-07 VITALS
OXYGEN SATURATION: 98 % | TEMPERATURE: 97.6 F | HEART RATE: 80 BPM | SYSTOLIC BLOOD PRESSURE: 149 MMHG | WEIGHT: 169.2 LBS | RESPIRATION RATE: 18 BRPM | HEIGHT: 65 IN | BODY MASS INDEX: 28.19 KG/M2 | DIASTOLIC BLOOD PRESSURE: 65 MMHG

## 2021-01-07 PROCEDURE — 74011000250 HC RX REV CODE- 250: Performed by: NURSE ANESTHETIST, CERTIFIED REGISTERED

## 2021-01-07 PROCEDURE — 76060000032 HC ANESTHESIA 0.5 TO 1 HR: Performed by: INTERNAL MEDICINE

## 2021-01-07 PROCEDURE — 74011250636 HC RX REV CODE- 250/636: Performed by: NURSE ANESTHETIST, CERTIFIED REGISTERED

## 2021-01-07 PROCEDURE — 2709999900 HC NON-CHARGEABLE SUPPLY: Performed by: INTERNAL MEDICINE

## 2021-01-07 PROCEDURE — 76040000007: Performed by: INTERNAL MEDICINE

## 2021-01-07 PROCEDURE — 74011250636 HC RX REV CODE- 250/636: Performed by: INTERNAL MEDICINE

## 2021-01-07 PROCEDURE — 77030019957 HC CUF BLN GASTSCP OCOA -B: Performed by: INTERNAL MEDICINE

## 2021-01-07 RX ORDER — FENTANYL CITRATE 50 UG/ML
INJECTION, SOLUTION INTRAMUSCULAR; INTRAVENOUS AS NEEDED
Status: DISCONTINUED | OUTPATIENT
Start: 2021-01-07 | End: 2021-01-07 | Stop reason: HOSPADM

## 2021-01-07 RX ORDER — LIDOCAINE HCL/PF 100 MG/5ML
SYRINGE (ML) INTRAVENOUS
Status: DISCONTINUED
Start: 2021-01-07 | End: 2021-01-07 | Stop reason: HOSPADM

## 2021-01-07 RX ORDER — SODIUM CHLORIDE, SODIUM LACTATE, POTASSIUM CHLORIDE, CALCIUM CHLORIDE 600; 310; 30; 20 MG/100ML; MG/100ML; MG/100ML; MG/100ML
50 INJECTION, SOLUTION INTRAVENOUS CONTINUOUS
Status: DISCONTINUED | OUTPATIENT
Start: 2021-01-07 | End: 2021-01-07 | Stop reason: HOSPADM

## 2021-01-07 RX ORDER — PROPOFOL 10 MG/ML
INJECTION, EMULSION INTRAVENOUS
Status: COMPLETED
Start: 2021-01-07 | End: 2021-01-07

## 2021-01-07 RX ORDER — FENTANYL CITRATE 50 UG/ML
INJECTION, SOLUTION INTRAMUSCULAR; INTRAVENOUS
Status: COMPLETED
Start: 2021-01-07 | End: 2021-01-07

## 2021-01-07 RX ORDER — PROPOFOL 10 MG/ML
INJECTION, EMULSION INTRAVENOUS AS NEEDED
Status: DISCONTINUED | OUTPATIENT
Start: 2021-01-07 | End: 2021-01-07 | Stop reason: HOSPADM

## 2021-01-07 RX ORDER — LIDOCAINE HYDROCHLORIDE 20 MG/ML
INJECTION, SOLUTION EPIDURAL; INFILTRATION; INTRACAUDAL; PERINEURAL AS NEEDED
Status: DISCONTINUED | OUTPATIENT
Start: 2021-01-07 | End: 2021-01-07 | Stop reason: HOSPADM

## 2021-01-07 RX ADMIN — PROPOFOL 50 MG: 10 INJECTION, EMULSION INTRAVENOUS at 11:00

## 2021-01-07 RX ADMIN — SODIUM CHLORIDE, POTASSIUM CHLORIDE, SODIUM LACTATE AND CALCIUM CHLORIDE: 600; 310; 30; 20 INJECTION, SOLUTION INTRAVENOUS at 10:57

## 2021-01-07 RX ADMIN — PROPOFOL 50 MG: 10 INJECTION, EMULSION INTRAVENOUS at 11:02

## 2021-01-07 RX ADMIN — LIDOCAINE HYDROCHLORIDE 100 MG: 20 INJECTION, SOLUTION EPIDURAL; INFILTRATION; INTRACAUDAL; PERINEURAL at 10:59

## 2021-01-07 RX ADMIN — FENTANYL CITRATE 50 MCG: 50 INJECTION, SOLUTION INTRAMUSCULAR; INTRAVENOUS at 11:00

## 2021-01-07 RX ADMIN — SODIUM CHLORIDE, POTASSIUM CHLORIDE, SODIUM LACTATE AND CALCIUM CHLORIDE 50 ML/HR: 600; 310; 30; 20 INJECTION, SOLUTION INTRAVENOUS at 09:50

## 2021-01-07 NOTE — ANESTHESIA PREPROCEDURE EVALUATION
Relevant Problems   No relevant active problems       Anesthetic History   No history of anesthetic complications            Review of Systems / Medical History  Patient summary reviewed, nursing notes reviewed and pertinent labs reviewed    Pulmonary  Within defined limits                 Neuro/Psych   Within defined limits           Cardiovascular  Within defined limits                     GI/Hepatic/Renal  Within defined limits              Endo/Other        Arthritis and cancer     Other Findings   Comments: Allergies  No Known Allergies  Ht: 5' 5\" (165.1 cm)  Weight: 76.7 kg (169 lb 3.2 oz)  BMI: 28.16 kg/m²    Procedure  ENDOSCOPIC ULTRASOUND (EUS) (N/A )  Pre-Proc, [facility] 02      Medical History  Arthritis         Past Medical History:   Diagnosis Date    Arthritis        Past Surgical History:   Procedure Laterality Date    HX BREAST BIOPSY      HX BREAST BIOPSY Left 10/28/2020    Left Breast Wire Localized Excisional Biopsy performed by Milagro Navas MD at [address] [place] [address]    HX COLONOSCOPY      HX GYN      hysterectomy    HX HERNIA REPAIR      HX HERNIA REPAIR      HX ORTHOPAEDIC      knee       Current Outpatient Medications   Medication Instructions    calcium-cholecalciferol, d3, (CALCIUM 600 + D) 600-125 mg-unit tab 600 mg, Oral, DAILY    echinacea 400 mg, Oral, DAILY    magnesium 250 mg, Oral, DAILY    montelukast (SINGULAIR) 10 mg, Oral, DAILY       No current facility-administered medications for this visit. No current outpatient medications on file. Facility-Administered Medications Ordered in Other Visits   Medication Dose Route Frequency    lactated Ringers infusion  50 mL/hr IntraVENous CONTINUOUS       No data found.     No results found for: WBC, WBCLT, HGBPOC, HGB, HGBP, HCTPOC, HCT, PHCT, RBCH, PLT, MCV, HGBEXT, HCTEXT, PLTEXT, HGBEXT, HCTEXT, PLTEXT  No results found for: NA, K, CL, CO2, AGAP, GLU, BUN, CREA, BUCR, GFRAA, GFRNA, CA, GFRAA  No results found for: APTT, PTP, INR, INREXT, INREXT  No results found for: GLU, GLUCPOC  Physical Exam    Airway  Mallampati: I  TM Distance: 4 - 6 cm  Neck ROM: normal range of motion   Mouth opening: Normal     Cardiovascular    Rhythm: regular  Rate: normal         Dental    Dentition: Upper partial plate and Lower partial plate     Pulmonary  Breath sounds clear to auscultation               Abdominal  GI exam deferred       Other Findings            Anesthetic Plan    ASA: 3  Anesthesia type: total IV anesthesia          Induction: Intravenous  Anesthetic plan and risks discussed with: Patient and Family      General anesthesia was prescribed for this patient because by definition it is \"a drug-induced loss of consciousness during which patients are not arousable, even by painful stimulation. \" Sometimes, the ability to independently maintain ventilatory function is often impaired and patients often require assistance in maintaining a patent airway. Occasionally, positive pressure ventilation may be required because of depressed spontaneous ventilation or drug-induced depression of neuromuscular function. This depth of anesthesia is preferred for endoscopic procedures to facilitate the procedure and for patient safety/quality of care.

## 2021-01-07 NOTE — ANESTHESIA POSTPROCEDURE EVALUATION
Procedure(s):  ENDOSCOPIC ULTRASOUND (EUS). total IV anesthesia, general    Anesthesia Post Evaluation      Multimodal analgesia: multimodal analgesia not used between 6 hours prior to anesthesia start to PACU discharge  Patient location during evaluation: bedside (Endoscopy suite)  Patient participation: complete - patient cannot participate  Level of consciousness: sleepy but conscious  Pain score: 0  Pain management: adequate  Airway patency: patent  Anesthetic complications: no  Cardiovascular status: acceptable  Respiratory status: acceptable and nasal cannula  Hydration status: acceptable  Comments: This patient remained on the stretcher. The patient was handed off to the endoscopy nursing team.  All questions regarding pre-, intra-, and postoperative care were answered.   Post anesthesia nausea and vomiting:  none      INITIAL Post-op Vital signs:   Vitals Value Taken Time   /66 01/07/21 1107   Temp     Pulse 63 01/07/21 1107   Resp 19 01/07/21 1107   SpO2 100 % 01/07/21 1107

## 2021-01-27 ENCOUNTER — OFFICE VISIT (OUTPATIENT)
Dept: PODIATRY | Age: 76
End: 2021-01-27
Payer: MEDICARE

## 2021-01-27 VITALS
BODY MASS INDEX: 28.96 KG/M2 | OXYGEN SATURATION: 99 % | DIASTOLIC BLOOD PRESSURE: 83 MMHG | WEIGHT: 173.8 LBS | HEIGHT: 65 IN | TEMPERATURE: 96.8 F | SYSTOLIC BLOOD PRESSURE: 114 MMHG | HEART RATE: 74 BPM

## 2021-01-27 DIAGNOSIS — B35.1 ONYCHOMYCOSIS: Primary | ICD-10-CM

## 2021-01-27 PROCEDURE — 1090F PRES/ABSN URINE INCON ASSESS: CPT | Performed by: PODIATRIST

## 2021-01-27 PROCEDURE — 11755 BIOPSY NAIL UNIT: CPT | Performed by: PODIATRIST

## 2021-01-27 PROCEDURE — G8427 DOCREV CUR MEDS BY ELIG CLIN: HCPCS | Performed by: PODIATRIST

## 2021-01-27 PROCEDURE — G8536 NO DOC ELDER MAL SCRN: HCPCS | Performed by: PODIATRIST

## 2021-01-27 PROCEDURE — G8432 DEP SCR NOT DOC, RNG: HCPCS | Performed by: PODIATRIST

## 2021-01-27 PROCEDURE — 99203 OFFICE O/P NEW LOW 30 MIN: CPT | Performed by: PODIATRIST

## 2021-01-27 PROCEDURE — G8400 PT W/DXA NO RESULTS DOC: HCPCS | Performed by: PODIATRIST

## 2021-01-27 PROCEDURE — 1101F PT FALLS ASSESS-DOCD LE1/YR: CPT | Performed by: PODIATRIST

## 2021-01-27 PROCEDURE — 3017F COLORECTAL CA SCREEN DOC REV: CPT | Performed by: PODIATRIST

## 2021-01-27 PROCEDURE — G9899 SCRN MAM PERF RSLTS DOC: HCPCS | Performed by: PODIATRIST

## 2021-01-27 PROCEDURE — G8419 CALC BMI OUT NRM PARAM NOF/U: HCPCS | Performed by: PODIATRIST

## 2021-01-27 RX ORDER — NAPROXEN SODIUM 220 MG
220 TABLET ORAL 2 TIMES DAILY WITH MEALS
COMMUNITY

## 2021-01-27 RX ORDER — HYOSCYAMINE SULFATE 0.12 MG/1
0.12 TABLET SUBLINGUAL
COMMUNITY
End: 2021-10-27 | Stop reason: ALTCHOICE

## 2021-01-27 NOTE — LETTER
2/4/2021 9:16 AM 
 
Ms. Fili Manjarrez 
[address] [address] [place], [place] [address] [phone] The pathology results have returned positive for yeast elements.  The patient can either initiate topical therapy or oral therapy.  The topical is roughly 50% effective and will need to be applied daily and removed at the end of each week with fingernail polish remover.  The oral is roughly 80% effective and is 1 pill/day for 12 weeks.  If the patient has additional questions, a virtual or inpatient visit can be scheduled to discuss in more detail. Thank you! Sincerely, Kristal Hernandez DPM

## 2021-01-31 NOTE — PROGRESS NOTES
[facility]    Subjective:         Patient is a 77 y.o. female who is being seen as a new pt for a pos rapidsible toenail infection. Patient dates her toenails are thick/discolored but denies ever having testing performed to confirm diagnosis. She denies any trauma to her toenails. She denies any overt pain in her toenails. She denies any systemic signs of infection. She states that she has tried multiple over-the-counter medications without relief of her symptoms. She denies any other lower extremity complaints    Past Medical History:   Diagnosis Date    Arthritis      Past Surgical History:   Procedure Laterality Date    HX BREAST BIOPSY      HX BREAST BIOPSY Left 10/28/2020    Left Breast Wire Localized Excisional Biopsy performed by Rc Queen MD at [address] [facility] HX COLONOSCOPY      HX GYN      hysterectomy    HX HERNIA REPAIR      HX HERNIA REPAIR      HX ORTHOPAEDIC      knee       Family History   Problem Relation Age of Onset    Cancer Mother     Cancer Father     Heart Disease Father       Social History     Tobacco Use    Smoking status: Never Smoker    Smokeless tobacco: Never Used   Substance Use Topics    Alcohol use: Yes     Alcohol/week: 1.0 standard drinks     Types: 1 Glasses of wine per week     Frequency: Never     No Known Allergies  Prior to Admission medications    Medication Sig Start Date End Date Taking? Authorizing Provider   naproxen sodium (Aleve) 220 mg tablet Take 220 mg by mouth two (2) times daily (with meals). Yes Provider, Historical   hyoscyamine SL (LEVSIN/SL) 0.125 mg SL tablet 0.125 mg by SubLINGual route every four (4) hours as needed for Cramping. Yes Provider, Historical   calcium-cholecalciferol, d3, (CALCIUM 600 + D) 600-125 mg-unit tab Take 600 mg by mouth daily. Yes Provider, Historical   echinacea 400 mg cap Take 400 mg by mouth daily. Yes Provider, Historical   magnesium 250 mg tab Take 250 mg by mouth daily. Yes Provider, Historical   montelukast (SINGULAIR) 10 mg tablet Take 10 mg by mouth daily. Yes Provider, Historical       Review of Systems   Constitutional: Negative. HENT: Negative. Eyes: Negative. Respiratory: Negative. Cardiovascular: Negative. Gastrointestinal: Negative. Endocrine: Negative. Genitourinary: Negative. Musculoskeletal: Negative. Skin: Negative. Allergic/Immunologic: Negative. Neurological: Negative. Hematological: Negative. Psychiatric/Behavioral: Negative. All other systems reviewed and are negative. Objective:     Visit Vitals  /83 (BP 1 Location: Left arm, BP Patient Position: Sitting)   Pulse 74   Temp 96.8 °F (36 °C) (Temporal)   Ht 5' 5\" (1.651 m)   Wt 173 lb 12.8 oz (78.8 kg)   SpO2 99%   BMI 28.92 kg/m²       Physical Exam  Vitals signs reviewed. Constitutional:       Appearance: She is overweight. Cardiovascular:      Pulses:           Dorsalis pedis pulses are 2+ on the right side and 2+ on the left side. Posterior tibial pulses are 2+ on the right side and 2+ on the left side. Pulmonary:      Effort: Pulmonary effort is normal.   Musculoskeletal:      Right lower leg: No edema. Left lower leg: No edema. Right foot: Normal range of motion. No deformity or bunion. Left foot: Normal range of motion. No deformity or bunion. Feet:      Right foot:      Protective Sensation: 10 sites tested. 10 sites sensed. Skin integrity: Skin integrity normal.      Toenail Condition: Right toenails are abnormally thick. Fungal disease present. Left foot:      Protective Sensation: 10 sites tested. 10 sites sensed. Skin integrity: Skin integrity normal.      Toenail Condition: Left toenails are abnormally thick. Fungal disease present. Lymphadenopathy:      Lower Body: No right inguinal adenopathy. No left inguinal adenopathy. Skin:     General: Skin is warm.       Capillary Refill: Capillary refill takes 2 to 3 seconds. Neurological:      Mental Status: She is alert and oriented to person, place, and time. Psychiatric:         Mood and Affect: Mood and affect normal.         Behavior: Behavior is cooperative. Data Review: No results found for this or any previous visit (from the past 24 hour(s)). Impression:       ICD-10-CM ICD-9-CM    1. Onychomycosis  B35.1 110.1 BIOPSY, NAIL UNIT         Recommendation:     Patient seen and evaluated in the office  Discussed and educated patient regarding her current medical condition  A toenail plate biopsy was taken of the right hallux to confirm the presence of fungal elements. This was performed without incident with a toenail nipper. No dressing was needed.   Instructed patient that when pathology results return, will discuss her treatment options in more depth

## 2021-04-27 ENCOUNTER — OFFICE VISIT (OUTPATIENT)
Dept: INTERNAL MEDICINE CLINIC | Age: 76
End: 2021-04-27
Payer: MEDICARE

## 2021-04-27 VITALS
WEIGHT: 172.8 LBS | HEART RATE: 81 BPM | BODY MASS INDEX: 28.79 KG/M2 | RESPIRATION RATE: 18 BRPM | DIASTOLIC BLOOD PRESSURE: 82 MMHG | TEMPERATURE: 98.1 F | SYSTOLIC BLOOD PRESSURE: 152 MMHG | HEIGHT: 65 IN | OXYGEN SATURATION: 100 %

## 2021-04-27 DIAGNOSIS — R53.83 OTHER FATIGUE: Primary | ICD-10-CM

## 2021-04-27 DIAGNOSIS — K86.2 PANCREATIC CYST: ICD-10-CM

## 2021-04-27 DIAGNOSIS — E78.00 PURE HYPERCHOLESTEROLEMIA: ICD-10-CM

## 2021-04-27 DIAGNOSIS — N64.89 RADIAL SCAR OF BREAST: ICD-10-CM

## 2021-04-27 DIAGNOSIS — B35.1 ONYCHOMYCOSIS: ICD-10-CM

## 2021-04-27 DIAGNOSIS — R03.0 ELEVATED BLOOD PRESSURE READING: ICD-10-CM

## 2021-04-27 DIAGNOSIS — Z13.220 SCREENING CHOLESTEROL LEVEL: ICD-10-CM

## 2021-04-27 PROCEDURE — G8400 PT W/DXA NO RESULTS DOC: HCPCS | Performed by: INTERNAL MEDICINE

## 2021-04-27 PROCEDURE — 1090F PRES/ABSN URINE INCON ASSESS: CPT | Performed by: INTERNAL MEDICINE

## 2021-04-27 PROCEDURE — G8510 SCR DEP NEG, NO PLAN REQD: HCPCS | Performed by: INTERNAL MEDICINE

## 2021-04-27 PROCEDURE — 99214 OFFICE O/P EST MOD 30 MIN: CPT | Performed by: INTERNAL MEDICINE

## 2021-04-27 PROCEDURE — G8427 DOCREV CUR MEDS BY ELIG CLIN: HCPCS | Performed by: INTERNAL MEDICINE

## 2021-04-27 PROCEDURE — 1101F PT FALLS ASSESS-DOCD LE1/YR: CPT | Performed by: INTERNAL MEDICINE

## 2021-04-27 PROCEDURE — G8419 CALC BMI OUT NRM PARAM NOF/U: HCPCS | Performed by: INTERNAL MEDICINE

## 2021-04-27 PROCEDURE — G9899 SCRN MAM PERF RSLTS DOC: HCPCS | Performed by: INTERNAL MEDICINE

## 2021-04-27 PROCEDURE — G8536 NO DOC ELDER MAL SCRN: HCPCS | Performed by: INTERNAL MEDICINE

## 2021-04-27 PROCEDURE — 3017F COLORECTAL CA SCREEN DOC REV: CPT | Performed by: INTERNAL MEDICINE

## 2021-04-27 RX ORDER — DICYCLOMINE HYDROCHLORIDE 10 MG/1
10 CAPSULE ORAL 2 TIMES DAILY
COMMUNITY
End: 2021-10-27 | Stop reason: ALTCHOICE

## 2021-04-27 NOTE — PROGRESS NOTES
1. Have you been to the ER, urgent care clinic since your last visit? Hospitalized since your last visit? No    2. Have you seen or consulted any other health care providers outside of the [address] [place] [address] since your last visit? Include any pap smears or colon screening.  Yes Dr Vila Leyden   Patient presents with    Follow-up    Hypertension       Visit Vitals  BP (!) 152/82 (BP 1 Location: Right arm, BP Patient Position: Sitting, BP Cuff Size: Adult)   Pulse 81   Temp 98.1 °F (36.7 °C) (Oral)   Resp 18   Ht 5' 5\" (1.651 m)   Wt 172 lb 12.8 oz (78.4 kg)   SpO2 100%   BMI 28.76 kg/m²

## 2021-04-27 NOTE — PROGRESS NOTES
Kaya Good is a 77 y.o. female and presents with Follow-up and Hypertension  Patient presents for follow-up accompanied by  we reviewed at length her history of pancreatic cyst and she is agreeable for repeat ultrasound. She is also followed by gastroenterologist Dr. PATEL [facility] last MRI October 2019 2 cystic lesions arising in the body of the pancreas not in communication noted evidence of cholelithiasis at the time as well patient has no abdominal pain or discomfort she had I believe an ERCP February of last year she has had the [employer] coronavirus vaccine and did well we discussed coronavirus pandemic evaluation treatment she is on Bentyl 10 mg twice a day for irritable bowel doing well she had a breast biopsy which showed radial scar and this was excised by breast surgeon Dr. Anthony Babin. She is status post incisional hernia repair long ago by Dr. Anabella Gilbert history of gallstones as noted above discussed preventive care issues-agreeable for the office phlebotomist to draw blood flow studies below-history of onychomycosis followed by podiatrist and I advised the patient to try Vicks VapoRub first we discussed oral agents such as terbinafine as well           Review of Systems  Constitutional: negative for fevers, chills, anorexia, weight loss and noted fatigue,no insomnia  Eyes:   negative for visual disturbance and irritation, eye discharge, eye pain. no eye redness. ENT:   negative for tinnitus, sore throat, nasal congestion, ear pain, hoarseness, hearing loss.,no snoring. Respiratory:  negative for cough, hemoptysis, shortness of breath, wheezing,  CV:   negative for chest pain, palpitations, lower extremity edema, shortness of breath while sitting, walking or at night  GI:   negative for nausea, vomiting, diarrhea, abdominal pain,melena,constipation. Endo:               negative for polyuria, polydipsia, polyphagia, cold or heat intolerance,hair loss.   Genitourinary: negative for frequency, dysuria and hematuria,urethral discharge,nocturia.straining while urination,urinary incontinence. Integument:  negative for rash and pruritus  Hematologic:  negative for easy bruising and gum/nose bleeding, enlarged nodes  Musculoskel: negative for myalgias, arthralgias, back pain, muscle weakness, joint pain, h/o fall,cramps,calf pain. Neurological:  negative for headaches, dizziness, vertigo, memory problems, gait and seizures loss of consciousness,no ataxia. Behavl/Psych: negative for feelings of anxiety, depression, mood changes ,sadness    Past Medical History:   Diagnosis Date    Arthritis      Past Surgical History:   Procedure Laterality Date    HX BREAST BIOPSY      HX BREAST BIOPSY Left 10/28/2020    Left Breast Wire Localized Excisional Biopsy performed by Willem Stern MD at [facility] HX COLONOSCOPY      HX GYN      hysterectomy    HX HERNIA REPAIR      HX HERNIA REPAIR      HX ORTHOPAEDIC      knee     Social History     Socioeconomic History    Marital status:      Spouse name: Not on file    Number of children: Not on file    Years of education: Not on file    Highest education level: Not on file   Tobacco Use    Smoking status: Never Smoker    Smokeless tobacco: Never Used   Substance and Sexual Activity    Alcohol use: Yes     Alcohol/week: 1.0 standard drinks     Types: 1 Glasses of wine per week     Frequency: Never    Drug use: Never     Family History   Problem Relation Age of Onset    Cancer Mother     Cancer Father     Heart Disease Father      Current Outpatient Medications   Medication Sig Dispense Refill    dicyclomine (BENTYL) 10 mg capsule Take 10 mg by mouth two (2) times a day.  naproxen sodium (Aleve) 220 mg tablet Take 220 mg by mouth two (2) times daily (with meals).  calcium-cholecalciferol, d3, (CALCIUM 600 + D) 600-125 mg-unit tab Take 600 mg by mouth daily.  echinacea 400 mg cap Take 400 mg by mouth daily.       magnesium 250 mg tab Take 250 mg by mouth daily.  montelukast (SINGULAIR) 10 mg tablet Take 10 mg by mouth daily.  hyoscyamine SL (LEVSIN/SL) 0.125 mg SL tablet 0.125 mg by SubLINGual route every four (4) hours as needed for Cramping. No Known Allergies    Objective:  Visit Vitals  BP (!) 152/82 (BP 1 Location: Right arm, BP Patient Position: Sitting, BP Cuff Size: Adult)   Pulse 81   Temp 98.1 °F (36.7 °C) (Oral)   Resp 18   Ht 5' 5\" (1.651 m)   Wt 172 lb 12.8 oz (78.4 kg)   SpO2 100%   BMI 28.76 kg/m²       Physical Exam:   Constitutional: General Appearance: healthy-appearing and obese. Level of Distress: NAD. Ambulation: ambulating normally. Psychiatric: Mental Status: normal mood and affect and active and alert. Orientation: to time, place, and person. no agitation. ,normal eye contact. normal insight  Head: Head: normocephalic and atraumatic. Eyes: Pupils: PERRLA. Sclerae: non-icteric. ENMT: No lesions on external ear, no hearing loss. No lesions on external nose, sinus tenderness, or nasal discharge. Lips, Teeth, and no mouth or lip ulcers   Neck: Neck: supple, trachea midline, and no masses. Lymph Nodes: no cervical LAD. Thyroid: no enlargement or nodules and non-tender. Lungs: Respiratory effort: no dyspnea. Auscultation: no wheezing, rales/crackles, or rhonchi and breath sounds normal and good air movement. Cardiovascular: Apical Impulse: not displaced. Heart Auscultation: normal S1 and S2; no murmurs, rubs, or gallops; and RRR. Neck vessels: no carotid bruits. Pulses including femoral / pedal: normal throughout. Abdomen: Bowel Sounds: normal. Inspection and Palpation: no tenderness, guarding, or masses and soft and non-distended. Liver: non-tender  Musculoskeletal[de-identified] Extremities: no edema or varicosities. Calf tenderness. Neurologic: Gait and Station: normal gait and station. Motor Strength normal right and left. Sensory and cerebellar intact.   Skin: Inspection and palpation: no rash, lesions, or ulcer.       Results for orders placed or performed during the hospital encounter of 01/03/21   SARS-COV-2   Result Value Ref Range    SARS-CoV-2 Please find results under separate order     NOVEL CORONAVIRUS (COVID-19)   Result Value Ref Range    SARS-CoV-2 Not Detected Not Detected       Assessment/Plan:    ICD-10-CM ICD-9-CM    1. Other fatigue  R53.83 780.79 CBC WITH AUTOMATED DIFF      METABOLIC PANEL, COMPREHENSIVE      TSH 3RD GENERATION      T4, FREE   2. Onychomycosis  B35.1 110.1    3. Elevated blood pressure reading  R03.0 796.2    4. Screening cholesterol level  Z13.220 V77.91    5. Pure hypercholesterolemia  E78.00 272.0 LIPID PANEL   6. Pancreatic cyst  K86.2 577.2    7. Radial scar of breast  N64.89 611.89      Orders Placed This Encounter    CBC WITH AUTOMATED DIFF    METABOLIC PANEL, COMPREHENSIVE    TSH 3RD GENERATION    T4, FREE    LIPID PANEL    dicyclomine (BENTYL) 10 mg capsule     Sig: Take 10 mg by mouth two (2) times a day. routine labs ordered, call if any problems    There are no Patient Instructions on file for this visit. Follow-up and Dispositions    · Return in about 6 months (around 10/27/2021).

## 2021-04-28 LAB
ALBUMIN SERPL-MCNC: 4.4 G/DL (ref 3.7–4.7)
ALBUMIN/GLOB SERPL: 1.6 {RATIO} (ref 1.2–2.2)
ALP SERPL-CCNC: 78 IU/L (ref 39–117)
ALT SERPL-CCNC: 14 IU/L (ref 0–32)
AST SERPL-CCNC: 15 IU/L (ref 0–40)
BASOPHILS # BLD AUTO: 0.1 X10E3/UL (ref 0–0.2)
BASOPHILS NFR BLD AUTO: 1 %
BILIRUB SERPL-MCNC: 0.2 MG/DL (ref 0–1.2)
BUN SERPL-MCNC: 11 MG/DL (ref 8–27)
BUN/CREAT SERPL: 20 (ref 12–28)
CALCIUM SERPL-MCNC: 9.9 MG/DL (ref 8.7–10.3)
CHLORIDE SERPL-SCNC: 103 MMOL/L (ref 96–106)
CHOLEST SERPL-MCNC: 190 MG/DL (ref 100–199)
CO2 SERPL-SCNC: 27 MMOL/L (ref 20–29)
CREAT SERPL-MCNC: 0.55 MG/DL (ref 0.57–1)
EOSINOPHIL # BLD AUTO: 0.1 X10E3/UL (ref 0–0.4)
EOSINOPHIL NFR BLD AUTO: 1 %
ERYTHROCYTE [DISTWIDTH] IN BLOOD BY AUTOMATED COUNT: 13.9 % (ref 11.7–15.4)
GLOBULIN SER CALC-MCNC: 2.8 G/DL (ref 1.5–4.5)
GLUCOSE SERPL-MCNC: 94 MG/DL (ref 65–99)
HCT VFR BLD AUTO: 39.5 % (ref 34–46.6)
HDLC SERPL-MCNC: 54 MG/DL
HGB BLD-MCNC: 12.4 G/DL (ref 11.1–15.9)
IMM GRANULOCYTES # BLD AUTO: 0 X10E3/UL (ref 0–0.1)
IMM GRANULOCYTES NFR BLD AUTO: 0 %
LDLC SERPL CALC-MCNC: 113 MG/DL (ref 0–99)
LYMPHOCYTES # BLD AUTO: 1.3 X10E3/UL (ref 0.7–3.1)
LYMPHOCYTES NFR BLD AUTO: 22 %
MCH RBC QN AUTO: 26.3 PG (ref 26.6–33)
MCHC RBC AUTO-ENTMCNC: 31.4 G/DL (ref 31.5–35.7)
MCV RBC AUTO: 84 FL (ref 79–97)
MONOCYTES # BLD AUTO: 0.5 X10E3/UL (ref 0.1–0.9)
MONOCYTES NFR BLD AUTO: 8 %
NEUTROPHILS # BLD AUTO: 4.2 X10E3/UL (ref 1.4–7)
NEUTROPHILS NFR BLD AUTO: 68 %
PLATELET # BLD AUTO: 263 X10E3/UL (ref 150–450)
POTASSIUM SERPL-SCNC: 4.7 MMOL/L (ref 3.5–5.2)
PROT SERPL-MCNC: 7.2 G/DL (ref 6–8.5)
RBC # BLD AUTO: 4.72 X10E6/UL (ref 3.77–5.28)
SODIUM SERPL-SCNC: 141 MMOL/L (ref 134–144)
T4 FREE SERPL-MCNC: 1.66 NG/DL (ref 0.82–1.77)
TRIGL SERPL-MCNC: 132 MG/DL (ref 0–149)
TSH SERPL DL<=0.005 MIU/L-ACNC: 0.66 UIU/ML (ref 0.45–4.5)
VLDLC SERPL CALC-MCNC: 23 MG/DL (ref 5–40)
WBC # BLD AUTO: 6.2 X10E3/UL (ref 3.4–10.8)

## 2021-05-25 ENCOUNTER — HOSPITAL ENCOUNTER (OUTPATIENT)
Dept: MRI IMAGING | Age: 76
Discharge: HOME | End: 2021-05-25
Attending: INTERNAL MEDICINE
Payer: MEDICARE

## 2021-05-25 DIAGNOSIS — K86.2 PANCREATIC CYST: ICD-10-CM

## 2021-05-25 PROCEDURE — A9577 INJ MULTIHANCE: HCPCS | Performed by: INTERNAL MEDICINE

## 2021-05-25 PROCEDURE — 74011250636 HC RX REV CODE- 250/636: Performed by: INTERNAL MEDICINE

## 2021-05-25 PROCEDURE — 74183 MRI ABD W/O CNTR FLWD CNTR: CPT

## 2021-05-25 RX ADMIN — GADOBENATE DIMEGLUMINE 17 ML: 529 INJECTION, SOLUTION INTRAVENOUS at 10:45

## 2021-05-25 NOTE — PROGRESS NOTES
Notify the patient MRI abdomen shows small cyst in the pancreas unchanged from 2019 not malignant stable we can recheck in 1 year

## 2021-07-18 ENCOUNTER — APPOINTMENT (OUTPATIENT)
Dept: CT IMAGING | Age: 76
DRG: 352 | End: 2021-07-18
Attending: EMERGENCY MEDICINE
Payer: MEDICARE

## 2021-07-18 ENCOUNTER — HOSPITAL ENCOUNTER (INPATIENT)
Age: 76
LOS: 3 days | Discharge: HOME | DRG: 352 | End: 2021-07-21
Attending: EMERGENCY MEDICINE | Admitting: SURGERY
Payer: MEDICARE

## 2021-07-18 DIAGNOSIS — K43.6 SPIGELIAN HERNIA WITH BOWEL OBSTRUCTION: Primary | ICD-10-CM

## 2021-07-18 LAB
ALBUMIN SERPL-MCNC: 4 G/DL (ref 3.5–5)
ALBUMIN/GLOB SERPL: 1 {RATIO} (ref 1.1–2.2)
ALP SERPL-CCNC: 77 U/L (ref 45–117)
ALT SERPL-CCNC: 26 U/L (ref 12–78)
ANION GAP SERPL CALC-SCNC: 9 MMOL/L (ref 5–15)
APPEARANCE UR: CLEAR
AST SERPL W P-5'-P-CCNC: 17 U/L (ref 15–37)
BACTERIA URNS QL MICRO: ABNORMAL /HPF
BASOPHILS # BLD: 0 K/UL (ref 0–0.1)
BASOPHILS NFR BLD: 0 % (ref 0–1)
BILIRUB DIRECT SERPL-MCNC: 0.1 MG/DL (ref 0–0.2)
BILIRUB SERPL-MCNC: 0.5 MG/DL (ref 0.2–1)
BILIRUB UR QL: NEGATIVE
BUN SERPL-MCNC: 16 MG/DL (ref 6–20)
BUN/CREAT SERPL: 22 (ref 12–20)
CA-I BLD-MCNC: 9.4 MG/DL (ref 8.5–10.1)
CHLORIDE SERPL-SCNC: 103 MMOL/L (ref 97–108)
CO2 SERPL-SCNC: 28 MMOL/L (ref 21–32)
COLOR UR: YELLOW
CREAT SERPL-MCNC: 0.73 MG/DL (ref 0.55–1.02)
DIFFERENTIAL METHOD BLD: ABNORMAL
EOSINOPHIL # BLD: 0 K/UL (ref 0–0.4)
EOSINOPHIL NFR BLD: 0 % (ref 0–7)
EPITH CASTS URNS QL MICRO: ABNORMAL /LPF
ERYTHROCYTE [DISTWIDTH] IN BLOOD BY AUTOMATED COUNT: 14.2 % (ref 11.5–14.5)
GLOBULIN SER CALC-MCNC: 4.1 G/DL (ref 2–4)
GLUCOSE SERPL-MCNC: 115 MG/DL (ref 65–100)
GLUCOSE UR STRIP.AUTO-MCNC: NEGATIVE MG/DL
HCT VFR BLD AUTO: 40.2 % (ref 35–47)
HGB BLD-MCNC: 13.1 G/DL (ref 11.5–16)
HGB UR QL STRIP: NEGATIVE
IMM GRANULOCYTES # BLD AUTO: 0 K/UL (ref 0–0.04)
IMM GRANULOCYTES NFR BLD AUTO: 0 % (ref 0–0.5)
KETONES UR QL STRIP.AUTO: 50 MG/DL
LEUKOCYTE ESTERASE UR QL STRIP.AUTO: NEGATIVE
LIPASE SERPL-CCNC: 106 U/L (ref 73–393)
LYMPHOCYTES # BLD: 0.8 K/UL (ref 0.8–3.5)
LYMPHOCYTES NFR BLD: 10 % (ref 12–49)
MCH RBC QN AUTO: 27.1 PG (ref 26–34)
MCHC RBC AUTO-ENTMCNC: 32.6 G/DL (ref 30–36.5)
MCV RBC AUTO: 83.1 FL (ref 80–99)
MONOCYTES # BLD: 0.2 K/UL (ref 0–1)
MONOCYTES NFR BLD: 3 % (ref 5–13)
NEUTS SEG # BLD: 7 K/UL (ref 1.8–8)
NEUTS SEG NFR BLD: 87 % (ref 32–75)
NITRITE UR QL STRIP.AUTO: NEGATIVE
PH UR STRIP: 7 [PH] (ref 5–8)
PLATELET # BLD AUTO: 259 K/UL (ref 150–400)
PMV BLD AUTO: 10.3 FL (ref 8.9–12.9)
POTASSIUM SERPL-SCNC: 3.9 MMOL/L (ref 3.5–5.1)
PROT SERPL-MCNC: 8.1 G/DL (ref 6.4–8.2)
PROT UR STRIP-MCNC: NEGATIVE MG/DL
RBC # BLD AUTO: 4.84 M/UL (ref 3.8–5.2)
RBC #/AREA URNS HPF: ABNORMAL /HPF (ref 0–5)
SODIUM SERPL-SCNC: 140 MMOL/L (ref 136–145)
SP GR UR REFRACTOMETRY: 1.01 (ref 1–1.03)
TROPONIN I SERPL-MCNC: <0.05 NG/ML
UROBILINOGEN UR QL STRIP.AUTO: 0.1 EU/DL (ref 0.2–1)
WBC # BLD AUTO: 8.1 K/UL (ref 3.6–11)
WBC URNS QL MICRO: ABNORMAL /HPF (ref 0–4)

## 2021-07-18 PROCEDURE — 83690 ASSAY OF LIPASE: CPT

## 2021-07-18 PROCEDURE — 74176 CT ABD & PELVIS W/O CONTRAST: CPT

## 2021-07-18 PROCEDURE — 65270000029 HC RM PRIVATE

## 2021-07-18 PROCEDURE — 80048 BASIC METABOLIC PNL TOTAL CA: CPT

## 2021-07-18 PROCEDURE — 93005 ELECTROCARDIOGRAM TRACING: CPT

## 2021-07-18 PROCEDURE — 85025 COMPLETE CBC W/AUTO DIFF WBC: CPT

## 2021-07-18 PROCEDURE — 80076 HEPATIC FUNCTION PANEL: CPT

## 2021-07-18 PROCEDURE — 74011250636 HC RX REV CODE- 250/636: Performed by: EMERGENCY MEDICINE

## 2021-07-18 PROCEDURE — 81003 URINALYSIS AUTO W/O SCOPE: CPT

## 2021-07-18 PROCEDURE — 96374 THER/PROPH/DIAG INJ IV PUSH: CPT

## 2021-07-18 PROCEDURE — 99285 EMERGENCY DEPT VISIT HI MDM: CPT

## 2021-07-18 PROCEDURE — 96375 TX/PRO/DX INJ NEW DRUG ADDON: CPT

## 2021-07-18 PROCEDURE — 84484 ASSAY OF TROPONIN QUANT: CPT

## 2021-07-18 PROCEDURE — 36415 COLL VENOUS BLD VENIPUNCTURE: CPT

## 2021-07-18 RX ORDER — LIDOCAINE HYDROCHLORIDE 20 MG/ML
JELLY TOPICAL ONCE
Status: DISPENSED | OUTPATIENT
Start: 2021-07-18 | End: 2021-07-19

## 2021-07-18 RX ORDER — MORPHINE SULFATE 4 MG/ML
4 INJECTION INTRAVENOUS ONCE
Status: COMPLETED | OUTPATIENT
Start: 2021-07-18 | End: 2021-07-18

## 2021-07-18 RX ORDER — SODIUM CHLORIDE 9 MG/ML
150 INJECTION, SOLUTION INTRAVENOUS ONCE
Status: COMPLETED | OUTPATIENT
Start: 2021-07-18 | End: 2021-07-18

## 2021-07-18 RX ORDER — ONDANSETRON 2 MG/ML
4 INJECTION INTRAMUSCULAR; INTRAVENOUS
Status: COMPLETED | OUTPATIENT
Start: 2021-07-18 | End: 2021-07-18

## 2021-07-18 RX ADMIN — ONDANSETRON 4 MG: 2 INJECTION INTRAMUSCULAR; INTRAVENOUS at 20:05

## 2021-07-18 RX ADMIN — SODIUM CHLORIDE 150 ML/HR: 9 INJECTION, SOLUTION INTRAVENOUS at 20:04

## 2021-07-18 RX ADMIN — MORPHINE SULFATE 4 MG: 4 INJECTION, SOLUTION INTRAMUSCULAR; INTRAVENOUS at 20:33

## 2021-07-18 NOTE — Clinical Note
Status[de-identified] INPATIENT [101]   Type of Bed: Surgical [18]   Inpatient Hospitalization Certified Necessary for the Following Reasons: 3.  Patient receiving treatment that can only be provided in an inpatient setting (further clarification in H&P documentation)   Admitting Diagnosis: Spigelian hernia with bowel obstruction [[id number]]   Admitting Physician: Cayla Lambert [[id number]]   Attending Physician: Cayla Lambert [[id number]]   Estimated Length of Stay: 2 Midnights   Discharge Plan[de-identified] Home with Office Follow-up

## 2021-07-19 ENCOUNTER — APPOINTMENT (OUTPATIENT)
Dept: CT IMAGING | Age: 76
DRG: 352 | End: 2021-07-19
Attending: SURGERY
Payer: MEDICARE

## 2021-07-19 PROBLEM — K56.609 BOWEL OBSTRUCTION (HCC): Status: ACTIVE | Noted: 2021-07-19

## 2021-07-19 LAB
ATRIAL RATE: 64 BPM
CALCULATED P AXIS, ECG09: -18 DEGREES
CALCULATED R AXIS, ECG10: 3 DEGREES
CALCULATED T AXIS, ECG11: 65 DEGREES
DIAGNOSIS, 93000: NORMAL
P-R INTERVAL, ECG05: 180 MS
Q-T INTERVAL, ECG07: 414 MS
QRS DURATION, ECG06: 94 MS
QTC CALCULATION (BEZET), ECG08: 427 MS
VENTRICULAR RATE, ECG03: 64 BPM

## 2021-07-19 PROCEDURE — 74176 CT ABD & PELVIS W/O CONTRAST: CPT

## 2021-07-19 PROCEDURE — 74011250636 HC RX REV CODE- 250/636: Performed by: SURGERY

## 2021-07-19 PROCEDURE — 99222 1ST HOSP IP/OBS MODERATE 55: CPT | Performed by: SURGERY

## 2021-07-19 PROCEDURE — 65270000029 HC RM PRIVATE

## 2021-07-19 PROCEDURE — 74011250637 HC RX REV CODE- 250/637: Performed by: SURGERY

## 2021-07-19 RX ORDER — ONDANSETRON 2 MG/ML
4 INJECTION INTRAMUSCULAR; INTRAVENOUS
Status: DISCONTINUED | OUTPATIENT
Start: 2021-07-19 | End: 2021-07-20 | Stop reason: SDUPTHER

## 2021-07-19 RX ORDER — ACETAMINOPHEN 325 MG/1
650 TABLET ORAL
Status: DISCONTINUED | OUTPATIENT
Start: 2021-07-19 | End: 2021-07-21 | Stop reason: HOSPADM

## 2021-07-19 RX ORDER — ACETAMINOPHEN 650 MG/1
650 SUPPOSITORY RECTAL
Status: DISCONTINUED | OUTPATIENT
Start: 2021-07-19 | End: 2021-07-21 | Stop reason: HOSPADM

## 2021-07-19 RX ORDER — SODIUM CHLORIDE 0.9 % (FLUSH) 0.9 %
5-40 SYRINGE (ML) INJECTION AS NEEDED
Status: DISCONTINUED | OUTPATIENT
Start: 2021-07-19 | End: 2021-07-20 | Stop reason: SDUPTHER

## 2021-07-19 RX ORDER — SODIUM CHLORIDE 0.9 % (FLUSH) 0.9 %
5-40 SYRINGE (ML) INJECTION EVERY 8 HOURS
Status: DISCONTINUED | OUTPATIENT
Start: 2021-07-19 | End: 2021-07-21 | Stop reason: HOSPADM

## 2021-07-19 RX ORDER — SODIUM CHLORIDE 9 MG/ML
125 INJECTION, SOLUTION INTRAVENOUS CONTINUOUS
Status: DISCONTINUED | OUTPATIENT
Start: 2021-07-19 | End: 2021-07-21

## 2021-07-19 RX ORDER — ENOXAPARIN SODIUM 100 MG/ML
40 INJECTION SUBCUTANEOUS DAILY
Status: DISCONTINUED | OUTPATIENT
Start: 2021-07-19 | End: 2021-07-21 | Stop reason: HOSPADM

## 2021-07-19 RX ORDER — ONDANSETRON 4 MG/1
4 TABLET, ORALLY DISINTEGRATING ORAL
Status: DISCONTINUED | OUTPATIENT
Start: 2021-07-19 | End: 2021-07-20 | Stop reason: SDUPTHER

## 2021-07-19 RX ORDER — POLYETHYLENE GLYCOL 3350 17 G/17G
17 POWDER, FOR SOLUTION ORAL DAILY PRN
Status: DISCONTINUED | OUTPATIENT
Start: 2021-07-19 | End: 2021-07-21 | Stop reason: HOSPADM

## 2021-07-19 RX ORDER — HYDROMORPHONE HYDROCHLORIDE 1 MG/ML
1 INJECTION, SOLUTION INTRAMUSCULAR; INTRAVENOUS; SUBCUTANEOUS
Status: DISPENSED | OUTPATIENT
Start: 2021-07-19 | End: 2021-07-21

## 2021-07-19 RX ADMIN — SODIUM CHLORIDE 125 ML/HR: 9 INJECTION, SOLUTION INTRAVENOUS at 09:49

## 2021-07-19 RX ADMIN — ACETAMINOPHEN 650 MG: 325 TABLET ORAL at 19:50

## 2021-07-19 RX ADMIN — Medication 10 ML: at 14:20

## 2021-07-19 RX ADMIN — ENOXAPARIN SODIUM 40 MG: 40 INJECTION SUBCUTANEOUS at 08:17

## 2021-07-19 RX ADMIN — Medication 10 ML: at 23:25

## 2021-07-19 RX ADMIN — ONDANSETRON 4 MG: 2 INJECTION INTRAMUSCULAR; INTRAVENOUS at 23:16

## 2021-07-19 RX ADMIN — HYDROMORPHONE HYDROCHLORIDE 1 MG: 1 INJECTION, SOLUTION INTRAMUSCULAR; INTRAVENOUS; SUBCUTANEOUS at 23:17

## 2021-07-19 RX ADMIN — SODIUM CHLORIDE 125 ML/HR: 9 INJECTION, SOLUTION INTRAVENOUS at 02:00

## 2021-07-19 NOTE — H&P
History and Physical    Chief complaints: Hernia   History of Presenting Illness:  Severiano Nida is a 77 y. o. woman currently hospitalized with a bowel obstruction. Patient had a few days of history of not feeling well with nausea and pain. Patient also noted a lump on the right lower quadrant area. Pain is localized sharp no other relieving factors associate with nausea. Pain has been constant. Patient currently NG tube in place. Patient says she is doing much better. She is not passing gas. She had a previous umbilical hernia and right inguinal hernia repair done previously. Otherwise no major health issues      Past Medical History:   Diagnosis Date    Arthritis       Past Surgical History:   Procedure Laterality Date    HX BREAST BIOPSY      HX BREAST BIOPSY Left 10/28/2020    Left Breast Wire Localized Excisional Biopsy performed by Roxana Ibarra MD at [facility] HX COLONOSCOPY      HX GYN      hysterectomy    HX HERNIA REPAIR      HX HERNIA REPAIR      HX ORTHOPAEDIC      knee     Family History   Problem Relation Age of Onset    Cancer Mother     Cancer Father     Heart Disease Father       Social History     Tobacco Use    Smoking status: Never Smoker    Smokeless tobacco: Never Used   Substance Use Topics    Alcohol use: Yes     Alcohol/week: 1.0 standard drinks     Types: 1 Glasses of wine per week       Prior to Admission medications    Medication Sig Start Date End Date Taking? Authorizing Provider   homeopathic drugs (MISC NATURAL PRODUCT OP) Take  by mouth. High potency grapine   Yes Provider, Historical   dicyclomine (BENTYL) 10 mg capsule Take 10 mg by mouth two (2) times a day. Yes Provider, Historical   naproxen sodium (Aleve) 220 mg tablet Take 220 mg by mouth two (2) times daily (with meals). Provider, Historical   hyoscyamine SL (LEVSIN/SL) 0.125 mg SL tablet 0.125 mg by SubLINGual route every four (4) hours as needed for Cramping.     Provider, Historical   calcium-cholecalciferol, d3, (CALCIUM 600 + D) 600-125 mg-unit tab Take 600 mg by mouth daily. Provider, Historical   echinacea 400 mg cap Take 400 mg by mouth daily. Provider, Historical   magnesium 250 mg tab Take 250 mg by mouth daily. Provider, Historical   montelukast (SINGULAIR) 10 mg tablet Take 10 mg by mouth daily. Provider, Historical     No Known Allergies     Review of Systems:  Pertinent review of systems discussed in HPI, and rest of organ systems personally reviewed and they are negative. Objective:   Vital signs reviewed:      Visit Vitals  BP (!) 145/69 (BP 1 Location: Left upper arm, BP Patient Position: At rest)   Pulse 70   Temp 98.4 °F (36.9 °C)   Resp 18   Ht 5' 5\" (1.651 m)   Wt 170 lb (77.1 kg)   SpO2 97%   BMI 28.29 kg/m²       Physical Exam:   General appearance:   Patient awake and alert  Head and neck atraumatic normocephalic  ENT oral mucosa normal no hoarse voice no jaundice  Eyes pupil gaze appropriate  Pulmonary no audible wheeze  Chest wall excursion is normal with respiration cycle  Abdomen soft nontender distended I do not appreciate any palpable mass right lower quadrant area  Neurologically intact nonfocal cranial nerves intact  Musculoskeletal system moving all 4 extremities  Skin is warm and moist  Hematologic system shows no bruising  Psychosocial appropriate cooperative  Vascular examination: Distally well perfused on lower extremity. Data Review: Labs are reviewed.  Discussed  Recent Results (from the past 24 hour(s))   URINALYSIS W/ RFLX MICROSCOPIC    Collection Time: 07/18/21  7:45 PM   Result Value Ref Range    Color Yellow      Appearance Clear Clear      Specific gravity 1.010 1.003 - 1.030      pH (UA) 7.0 5.0 - 8.0      Protein Negative Negative mg/dL    Glucose Negative Negative mg/dL    Ketone 50 (A) Negative mg/dL    Bilirubin Negative Negative      Blood Negative Negative      Urobilinogen 0.1 (L) 0.2 - 1.0 EU/dL Nitrites Negative Negative      Leukocyte Esterase Negative Negative      WBC 0-4 0 - 4 /hpf    RBC 0-5 0 - 5 /hpf    Epithelial cells Few Few /lpf    Bacteria 1+ (A) Negative /hpf   CBC WITH AUTOMATED DIFF    Collection Time: 07/18/21  7:50 PM   Result Value Ref Range    WBC 8.1 3.6 - 11.0 K/uL    RBC 4.84 3.80 - 5.20 M/uL    HGB 13.1 11.5 - 16.0 g/dL    HCT 40.2 35.0 - 47.0 %    MCV 83.1 80.0 - 99.0 FL    MCH 27.1 26.0 - 34.0 PG    MCHC 32.6 30.0 - 36.5 g/dL    RDW 14.2 11.5 - 14.5 %    PLATELET 971 558 - 158 K/uL    MPV 10.3 8.9 - 12.9 FL    NEUTROPHILS 87 (H) 32 - 75 %    LYMPHOCYTES 10 (L) 12 - 49 %    MONOCYTES 3 (L) 5 - 13 %    EOSINOPHILS 0 0 - 7 %    BASOPHILS 0 0 - 1 %    IMMATURE GRANULOCYTES 0 0.0 - 0.5 %    ABS. NEUTROPHILS 7.0 1.8 - 8.0 K/UL    ABS. LYMPHOCYTES 0.8 0.8 - 3.5 K/UL    ABS. MONOCYTES 0.2 0.0 - 1.0 K/UL    ABS. EOSINOPHILS 0.0 0.0 - 0.4 K/UL    ABS. BASOPHILS 0.0 0.0 - 0.1 K/UL    ABS. IMM. GRANS. 0.0 0.00 - 0.04 K/UL    DF AUTOMATED     METABOLIC PANEL, BASIC    Collection Time: 07/18/21  7:50 PM   Result Value Ref Range    Sodium 140 136 - 145 mmol/L    Potassium 3.9 3.5 - 5.1 mmol/L    Chloride 103 97 - 108 mmol/L    CO2 28 21 - 32 mmol/L    Anion gap 9 5 - 15 mmol/L    Glucose 115 (H) 65 - 100 mg/dL    BUN 16 6 - 20 mg/dL    Creatinine 0.73 0.55 - 1.02 mg/dL    BUN/Creatinine ratio 22 (H) 12 - 20      GFR est AA >60 >60 ml/min/1.73m2    GFR est non-AA >60 >60 ml/min/1.73m2    Calcium 9.4 8.5 - 10.1 mg/dL   HEPATIC FUNCTION PANEL    Collection Time: 07/18/21  7:50 PM   Result Value Ref Range    Protein, total 8.1 6.4 - 8.2 g/dL    Albumin 4.0 3.5 - 5.0 g/dL    Globulin 4.1 (H) 2.0 - 4.0 g/dL    A-G Ratio 1.0 (L) 1.1 - 2.2      Bilirubin, total 0.5 0.2 - 1.0 mg/dL    Bilirubin, direct 0.1 0.0 - 0.2 mg/dL    Alk.  phosphatase 77 45 - 117 U/L    AST (SGOT) 17 15 - 37 U/L    ALT (SGPT) 26 12 - 78 U/L   LIPASE    Collection Time: 07/18/21  7:50 PM   Result Value Ref Range    Lipase 106 73 - 393 U/L   TROPONIN I    Collection Time: 07/18/21  7:50 PM   Result Value Ref Range    Troponin-I, Qt. <0.05 <0.05 ng/mL         Imagings reviewed: discussed as below. Assessment:     Active Problems:    Spigelian hernia with bowel obstruction (7/18/2021)      Bowel obstruction ([address] (7/19/2021)        Plan:     Looks like the patient may have a resolved and spontaneously reduced spigelian hernia. I will confirm this repeat CT scan today. If the hernia is reduced and the bowel obstruction is resolving then patient has a couple of options including hernia repair as inpatient versus outpatient. We will discuss these options after CT scan is reviewed.

## 2021-07-19 NOTE — ED NOTES
# 14 Fr NGT inserted right nostril with minimal difficulty. Irrigated with 100cc NS.  200cc  bile return.

## 2021-07-19 NOTE — ED PROVIDER NOTES
EMERGENCY DEPARTMENT HISTORY AND PHYSICAL EXAM      Date: 7/18/2021  Patient Name: Ernestine Oropeza    History of Presenting Illness       History Provided By: Patient    HPI: Ernestine Oropeza, 77 y.o. female presents to the ED with cc of abdominal pain and vomiting. Patient complains of right lower quadrant abdominal pain and vomiting since morning. Pain is constant at right lower quadrant with intermittent episode of radiation to left lower quadrant. Patient states that she had a multiple episode of vomiting. Last bowel movement was yesterday. Patient states that she has not passing flatulence today. 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 No urinary symptoms. No fever chills. No chest pain or shortness of breath. Past History     Past Medical History:  Past Medical History:   Diagnosis Date    Arthritis        Past Surgical History:  Past Surgical History:   Procedure Laterality Date    HX BREAST BIOPSY      HX BREAST BIOPSY Left 10/28/2020    Left Breast Wire Localized Excisional Biopsy performed by Eve Rodríguez MD at [facility] HX COLONOSCOPY      HX GYN      hysterectomy    HX HERNIA REPAIR      HX HERNIA REPAIR      HX ORTHOPAEDIC      knee       Family History:  Family History   Problem Relation Age of Onset    Cancer Mother     Cancer Father     Heart Disease Father        Social History:  Social History     Tobacco Use    Smoking status: Never Smoker    Smokeless tobacco: Never Used   Substance Use Topics    Alcohol use: Yes     Alcohol/week: 1.0 standard drinks     Types: 1 Glasses of wine per week    Drug use: Never       Allergies:  No Known Allergies      Review of Systems   Review of Systems   Constitutional: Negative for activity change, appetite change, chills and fever. HENT: Negative for sore throat. Eyes: Negative for discharge. Respiratory: Negative for shortness of breath. Cardiovascular: Negative for chest pain. Gastrointestinal: Positive for abdominal pain, nausea and vomiting. Negative for blood in stool. Endocrine: Negative for polyuria. Genitourinary: Negative for difficulty urinating. Musculoskeletal: Negative for arthralgias. Skin: Negative for rash. Neurological: Negative for headaches. Hematological: Negative for adenopathy. Psychiatric/Behavioral: Negative for suicidal ideas. All other systems reviewed and are negative. Physical Exam   Physical Exam  Vitals and nursing note reviewed. Constitutional:       Appearance: Normal appearance. HENT:      Head: Normocephalic and atraumatic. Nose: Nose normal.      Mouth/Throat:      Mouth: Mucous membranes are moist.      Pharynx: Oropharynx is clear. Eyes:      Conjunctiva/sclera: Conjunctivae normal.   Cardiovascular:      Rate and Rhythm: Normal rate and regular rhythm. Heart sounds: Normal heart sounds. Pulmonary:      Effort: Pulmonary effort is normal.      Breath sounds: Normal breath sounds. Abdominal:      General: Abdomen is flat. Bowel sounds are normal.      Palpations: Abdomen is soft. Tenderness: There is no guarding or rebound. Comments: Right lower quadrant tenderness. No palpable hernia or mass. Musculoskeletal:      Cervical back: Neck supple. Right lower leg: No edema. Left lower leg: No edema. Skin:     General: Skin is warm and dry. Neurological:      General: No focal deficit present. Mental Status: She is alert and oriented to person, place, and time.    Psychiatric:         Mood and Affect: Mood normal.         Diagnostic Study Results     Labs -     Recent Results (from the past 12 hour(s))   URINALYSIS W/ RFLX MICROSCOPIC    Collection Time: 07/18/21  7:45 PM   Result Value Ref Range    Color Yellow      Appearance Clear Clear      Specific gravity 1.010 1.003 - 1.030      pH (UA) 7.0 5.0 - 8.0      Protein Negative Negative mg/dL Glucose Negative Negative mg/dL    Ketone 50 (A) Negative mg/dL    Bilirubin Negative Negative      Blood Negative Negative      Urobilinogen 0.1 (L) 0.2 - 1.0 EU/dL    Nitrites Negative Negative      Leukocyte Esterase Negative Negative      WBC 0-4 0 - 4 /hpf    RBC 0-5 0 - 5 /hpf    Epithelial cells Few Few /lpf    Bacteria 1+ (A) Negative /hpf   CBC WITH AUTOMATED DIFF    Collection Time: 07/18/21  7:50 PM   Result Value Ref Range    WBC 8.1 3.6 - 11.0 K/uL    RBC 4.84 3.80 - 5.20 M/uL    HGB 13.1 11.5 - 16.0 g/dL    HCT 40.2 35.0 - 47.0 %    MCV 83.1 80.0 - 99.0 FL    MCH 27.1 26.0 - 34.0 PG    MCHC 32.6 30.0 - 36.5 g/dL    RDW 14.2 11.5 - 14.5 %    PLATELET 273 409 - 469 K/uL    MPV 10.3 8.9 - 12.9 FL    NEUTROPHILS 87 (H) 32 - 75 %    LYMPHOCYTES 10 (L) 12 - 49 %    MONOCYTES 3 (L) 5 - 13 %    EOSINOPHILS 0 0 - 7 %    BASOPHILS 0 0 - 1 %    IMMATURE GRANULOCYTES 0 0.0 - 0.5 %    ABS. NEUTROPHILS 7.0 1.8 - 8.0 K/UL    ABS. LYMPHOCYTES 0.8 0.8 - 3.5 K/UL    ABS. MONOCYTES 0.2 0.0 - 1.0 K/UL    ABS. EOSINOPHILS 0.0 0.0 - 0.4 K/UL    ABS. BASOPHILS 0.0 0.0 - 0.1 K/UL    ABS. IMM. GRANS. 0.0 0.00 - 0.04 K/UL    DF AUTOMATED     METABOLIC PANEL, BASIC    Collection Time: 07/18/21  7:50 PM   Result Value Ref Range    Sodium 140 136 - 145 mmol/L    Potassium 3.9 3.5 - 5.1 mmol/L    Chloride 103 97 - 108 mmol/L    CO2 28 21 - 32 mmol/L    Anion gap 9 5 - 15 mmol/L    Glucose 115 (H) 65 - 100 mg/dL    BUN 16 6 - 20 mg/dL    Creatinine 0.73 0.55 - 1.02 mg/dL    BUN/Creatinine ratio 22 (H) 12 - 20      GFR est AA >60 >60 ml/min/1.73m2    GFR est non-AA >60 >60 ml/min/1.73m2    Calcium 9.4 8.5 - 10.1 mg/dL   HEPATIC FUNCTION PANEL    Collection Time: 07/18/21  7:50 PM   Result Value Ref Range    Protein, total 8.1 6.4 - 8.2 g/dL    Albumin 4.0 3.5 - 5.0 g/dL    Globulin 4.1 (H) 2.0 - 4.0 g/dL    A-G Ratio 1.0 (L) 1.1 - 2.2      Bilirubin, total 0.5 0.2 - 1.0 mg/dL    Bilirubin, direct 0.1 0.0 - 0.2 mg/dL    Alk. phosphatase 77 45 - 117 U/L    AST (SGOT) 17 15 - 37 U/L    ALT (SGPT) 26 12 - 78 U/L   LIPASE    Collection Time: 07/18/21  7:50 PM   Result Value Ref Range    Lipase 106 73 - 393 U/L   TROPONIN I    Collection Time: 07/18/21  7:50 PM   Result Value Ref Range    Troponin-I, Qt. <0.05 <0.05 ng/mL       Radiologic Studies -   [unfilled]  CT Results  (Last 48 hours)               07/18/21 2047  CT ABD PELV WO CONT Final result    Impression:  Right-sided spigelian hernia containing small bowel. Probable   partial obstruction of small bowel at the site of hernia. Left colonic   diverticulosis. Cholelithiasis. See above. Narrative:  Dose Reduction:    All CT scans at this facility are performed using dose reduction optimization   techniques as appropriate to a performed exam including the following: Automated   exposure control, adjustments of the mA and/or kV according to patient size, or   use of iterative reconstruction technique. Findings: Unenhanced images were obtained, compared with prior MRI from   5/25/2021 and CT from 3/18/2019. Subtle fibrosis or subsegmental atelectasis in   the lower right middle lobe and left infrahilar region. Small fat-containing   [place] hernia on the left. Small sliding hiatus hernia with partially   intrathoracic stomach. Gallbladder noteworthy for cholelithiasis with layering   sludge/stones. No focal abnormality is seen involving the liver or pancreas. Punctate calcifications of spleen, likely granulomata. No significant finding is   seen involving adrenal glands, kidneys, ureters, or urinary bladder. Hysterectomy. Descending and sigmoid colonic diverticula. Medial positioning of   a;, with cecum to the left of midline. Normal appendix. Dilated fluid-filled   loops of small bowel. Fluid-filled small bowel present within right-sided   spigelian hernia. Possible smaller left-sided spigelian defect, without   herniated bowel.  Sequelae of mesh repair of midline ventral hernia at/above the   level of the umbilicus. Multilevel degenerative changes of the spine. S-shaped   thoracolumbar scoliosis. CXR Results  (Last 48 hours)    None          Medical Decision Making and ED Course   I am the first provider for this patient. I reviewed the vital signs, available nursing notes, past medical history, past surgical history, family history and social history. Vital Signs-Reviewed the patient's vital signs. Records Reviewed: Nursing note reviewed      ED Course:   Initial assessment performed. The patients presenting problems have been discussed, and they are in agreement with the care plan formulated and outlined with them. I have encouraged them to ask questions as they arise throughout their visit. Pain improved. No active vomiting. Abdomen exam with mild right lower quadrant abdominal tenderness without palpable hernia or mass. Procedures       Emir Quintanilla MD                Disposition     Admitted      DISCHARGE PLAN:  1. Current Discharge Medication List      CONTINUE these medications which have NOT CHANGED    Details   dicyclomine (BENTYL) 10 mg capsule Take 10 mg by mouth two (2) times a day. naproxen sodium (Aleve) 220 mg tablet Take 220 mg by mouth two (2) times daily (with meals). hyoscyamine SL (LEVSIN/SL) 0.125 mg SL tablet 0.125 mg by SubLINGual route every four (4) hours as needed for Cramping. calcium-cholecalciferol, d3, (CALCIUM 600 + D) 600-125 mg-unit tab Take 600 mg by mouth daily. echinacea 400 mg cap Take 400 mg by mouth daily. magnesium 250 mg tab Take 250 mg by mouth daily. montelukast (SINGULAIR) 10 mg tablet Take 10 mg by mouth daily. 2.   Follow-up Information    None       3. Return to ED if worse     Diagnosis     Clinical Impression:   1.  Spigelian hernia with bowel obstruction        Attestations:    Emir Quintanilla MD    Please note that this dictation was completed with Dragon, the computer voice recognition software. Quite often unanticipated grammatical, syntax, homophones, and other interpretive errors are inadvertently transcribed by the computer software. Please disregard these errors. Please excuse any errors that have escaped final proofreading. Thank you.

## 2021-07-20 ENCOUNTER — ANESTHESIA (OUTPATIENT)
Dept: SURGERY | Age: 76
DRG: 352 | End: 2021-07-20
Payer: MEDICARE

## 2021-07-20 ENCOUNTER — ANESTHESIA EVENT (OUTPATIENT)
Dept: SURGERY | Age: 76
DRG: 352 | End: 2021-07-20
Payer: MEDICARE

## 2021-07-20 LAB
ALBUMIN SERPL-MCNC: 3.2 G/DL (ref 3.5–5)
ALBUMIN/GLOB SERPL: 0.9 {RATIO} (ref 1.1–2.2)
ALP SERPL-CCNC: 67 U/L (ref 45–117)
ALT SERPL-CCNC: 25 U/L (ref 12–78)
ANION GAP SERPL CALC-SCNC: 9 MMOL/L (ref 5–15)
AST SERPL W P-5'-P-CCNC: 19 U/L (ref 15–37)
BASOPHILS # BLD: 0 K/UL (ref 0–0.1)
BASOPHILS NFR BLD: 1 % (ref 0–1)
BILIRUB SERPL-MCNC: 0.6 MG/DL (ref 0.2–1)
BUN SERPL-MCNC: 20 MG/DL (ref 6–20)
BUN/CREAT SERPL: 41 (ref 12–20)
CA-I BLD-MCNC: 8.5 MG/DL (ref 8.5–10.1)
CHLORIDE SERPL-SCNC: 109 MMOL/L (ref 97–108)
CO2 SERPL-SCNC: 22 MMOL/L (ref 21–32)
CREAT SERPL-MCNC: 0.49 MG/DL (ref 0.55–1.02)
DIFFERENTIAL METHOD BLD: ABNORMAL
EOSINOPHIL # BLD: 0 K/UL (ref 0–0.4)
EOSINOPHIL NFR BLD: 0 % (ref 0–7)
ERYTHROCYTE [DISTWIDTH] IN BLOOD BY AUTOMATED COUNT: 14.4 % (ref 11.5–14.5)
GLOBULIN SER CALC-MCNC: 3.7 G/DL (ref 2–4)
GLUCOSE SERPL-MCNC: 66 MG/DL (ref 65–100)
HCT VFR BLD AUTO: 37 % (ref 35–47)
HGB BLD-MCNC: 11.5 G/DL (ref 11.5–16)
IMM GRANULOCYTES # BLD AUTO: 0 K/UL (ref 0–0.04)
IMM GRANULOCYTES NFR BLD AUTO: 0 % (ref 0–0.5)
LYMPHOCYTES # BLD: 1 K/UL (ref 0.8–3.5)
LYMPHOCYTES NFR BLD: 13 % (ref 12–49)
MCH RBC QN AUTO: 26.3 PG (ref 26–34)
MCHC RBC AUTO-ENTMCNC: 31.1 G/DL (ref 30–36.5)
MCV RBC AUTO: 84.7 FL (ref 80–99)
MONOCYTES # BLD: 0.5 K/UL (ref 0–1)
MONOCYTES NFR BLD: 7 % (ref 5–13)
NEUTS SEG # BLD: 6.2 K/UL (ref 1.8–8)
NEUTS SEG NFR BLD: 79 % (ref 32–75)
NRBC # BLD: 0 K/UL (ref 0–0.01)
NRBC BLD-RTO: 0 PER 100 WBC
PLATELET # BLD AUTO: 209 K/UL (ref 150–400)
PMV BLD AUTO: 11.1 FL (ref 8.9–12.9)
POTASSIUM SERPL-SCNC: 3.5 MMOL/L (ref 3.5–5.1)
PROT SERPL-MCNC: 6.9 G/DL (ref 6.4–8.2)
RBC # BLD AUTO: 4.37 M/UL (ref 3.8–5.2)
SODIUM SERPL-SCNC: 140 MMOL/L (ref 136–145)
WBC # BLD AUTO: 7.8 K/UL (ref 3.6–11)

## 2021-07-20 PROCEDURE — 0DBW4ZZ EXCISION OF PERITONEUM, PERCUTANEOUS ENDOSCOPIC APPROACH: ICD-10-PCS | Performed by: SURGERY

## 2021-07-20 PROCEDURE — 0DQV4ZZ REPAIR MESENTERY, PERCUTANEOUS ENDOSCOPIC APPROACH: ICD-10-PCS | Performed by: SURGERY

## 2021-07-20 PROCEDURE — 74011250636 HC RX REV CODE- 250/636: Performed by: SURGERY

## 2021-07-20 PROCEDURE — 74011000250 HC RX REV CODE- 250: Performed by: SURGERY

## 2021-07-20 PROCEDURE — 76210000063 HC OR PH I REC FIRST 0.5 HR: Performed by: SURGERY

## 2021-07-20 PROCEDURE — 77030031139 HC SUT VCRL2 J&J -A: Performed by: SURGERY

## 2021-07-20 PROCEDURE — 99232 SBSQ HOSP IP/OBS MODERATE 35: CPT | Performed by: SURGERY

## 2021-07-20 PROCEDURE — 49653 PR LAP, VENTRAL HERNIA REPAIR,INCARCERATED: CPT | Performed by: SURGERY

## 2021-07-20 PROCEDURE — 77030008518 HC TBNG INSUF ENDO STRY -B: Performed by: SURGERY

## 2021-07-20 PROCEDURE — C1781 MESH (IMPLANTABLE): HCPCS | Performed by: SURGERY

## 2021-07-20 PROCEDURE — 0YU54JZ SUPPLEMENT RIGHT INGUINAL REGION WITH SYNTHETIC SUBSTITUTE, PERCUTANEOUS ENDOSCOPIC APPROACH: ICD-10-PCS | Performed by: SURGERY

## 2021-07-20 PROCEDURE — 77030019908 HC STETH ESOPH SIMS -A: Performed by: ANESTHESIOLOGY

## 2021-07-20 PROCEDURE — 77030002933 HC SUT MCRYL J&J -A: Performed by: SURGERY

## 2021-07-20 PROCEDURE — 77030013079 HC BLNKT BAIR HGGR 3M -A: Performed by: ANESTHESIOLOGY

## 2021-07-20 PROCEDURE — 77030009403 HC ELECTRD ENDO MEGA -B: Performed by: SURGERY

## 2021-07-20 PROCEDURE — 77030018684: Performed by: SURGERY

## 2021-07-20 PROCEDURE — 76060000033 HC ANESTHESIA 1 TO 1.5 HR: Performed by: SURGERY

## 2021-07-20 PROCEDURE — 77030008606 HC TRCR ENDOSC KII AMR -B: Performed by: SURGERY

## 2021-07-20 PROCEDURE — 36415 COLL VENOUS BLD VENIPUNCTURE: CPT

## 2021-07-20 PROCEDURE — 88302 TISSUE EXAM BY PATHOLOGIST: CPT

## 2021-07-20 PROCEDURE — 77030008756 HC TU IRR SUC STRY -B: Performed by: SURGERY

## 2021-07-20 PROCEDURE — 77030018813 HC SCIS LAPSCP EPIX DISP AMR -B: Performed by: SURGERY

## 2021-07-20 PROCEDURE — 77030009851 HC PCH RTVR ENDOSC AMR -B: Performed by: SURGERY

## 2021-07-20 PROCEDURE — 77030008684 HC TU ET CUF COVD -B: Performed by: ANESTHESIOLOGY

## 2021-07-20 PROCEDURE — 0D9670Z DRAINAGE OF STOMACH WITH DRAINAGE DEVICE, VIA NATURAL OR ARTIFICIAL OPENING: ICD-10-PCS | Performed by: SURGERY

## 2021-07-20 PROCEDURE — 85025 COMPLETE CBC W/AUTO DIFF WBC: CPT

## 2021-07-20 PROCEDURE — 76010000149 HC OR TIME 1 TO 1.5 HR: Performed by: SURGERY

## 2021-07-20 PROCEDURE — 49321 LAPAROSCOPY BIOPSY: CPT | Performed by: SURGERY

## 2021-07-20 PROCEDURE — 77030040361 HC SLV COMPR DVT MDII -B: Performed by: SURGERY

## 2021-07-20 PROCEDURE — 74011250636 HC RX REV CODE- 250/636: Performed by: NURSE ANESTHETIST, CERTIFIED REGISTERED

## 2021-07-20 PROCEDURE — 80053 COMPREHEN METABOLIC PANEL: CPT

## 2021-07-20 PROCEDURE — 74011000250 HC RX REV CODE- 250: Performed by: NURSE ANESTHETIST, CERTIFIED REGISTERED

## 2021-07-20 PROCEDURE — 65270000029 HC RM PRIVATE

## 2021-07-20 PROCEDURE — 77030002996 HC SUT SLK J&J -A: Performed by: SURGERY

## 2021-07-20 PROCEDURE — 77030025927 HC STPLR FIX SECURSTRP J&J -F: Performed by: SURGERY

## 2021-07-20 PROCEDURE — 77030026438 HC STYL ET INTUB CARD -A: Performed by: ANESTHESIOLOGY

## 2021-07-20 PROCEDURE — 77030018831 HC SOL IRR H20 BAXT -A: Performed by: SURGERY

## 2021-07-20 PROCEDURE — 77030012799 HC TRCR GELPRT BLN AMR -B: Performed by: SURGERY

## 2021-07-20 PROCEDURE — 2709999900 HC NON-CHARGEABLE SUPPLY: Performed by: SURGERY

## 2021-07-20 DEVICE — MESH SURG DIA4.5IN CIR W/ ECHO 2 POS SYS VENTRALIGHT: Type: IMPLANTABLE DEVICE | Site: ABDOMEN | Status: FUNCTIONAL

## 2021-07-20 RX ORDER — FENTANYL CITRATE 50 UG/ML
INJECTION, SOLUTION INTRAMUSCULAR; INTRAVENOUS AS NEEDED
Status: DISCONTINUED | OUTPATIENT
Start: 2021-07-20 | End: 2021-07-20 | Stop reason: HOSPADM

## 2021-07-20 RX ORDER — LIDOCAINE HYDROCHLORIDE 20 MG/ML
INJECTION, SOLUTION EPIDURAL; INFILTRATION; INTRACAUDAL; PERINEURAL AS NEEDED
Status: DISCONTINUED | OUTPATIENT
Start: 2021-07-20 | End: 2021-07-20 | Stop reason: HOSPADM

## 2021-07-20 RX ORDER — DEXAMETHASONE SODIUM PHOSPHATE 4 MG/ML
INJECTION, SOLUTION INTRA-ARTICULAR; INTRALESIONAL; INTRAMUSCULAR; INTRAVENOUS; SOFT TISSUE AS NEEDED
Status: DISCONTINUED | OUTPATIENT
Start: 2021-07-20 | End: 2021-07-20 | Stop reason: HOSPADM

## 2021-07-20 RX ORDER — ONDANSETRON 4 MG/1
4 TABLET, ORALLY DISINTEGRATING ORAL
Status: DISCONTINUED | OUTPATIENT
Start: 2021-07-20 | End: 2021-07-21 | Stop reason: HOSPADM

## 2021-07-20 RX ORDER — ENOXAPARIN SODIUM 100 MG/ML
40 INJECTION SUBCUTANEOUS DAILY
Status: DISCONTINUED | OUTPATIENT
Start: 2021-07-21 | End: 2021-07-20 | Stop reason: SDUPTHER

## 2021-07-20 RX ORDER — ONDANSETRON 2 MG/ML
4 INJECTION INTRAMUSCULAR; INTRAVENOUS
Status: DISCONTINUED | OUTPATIENT
Start: 2021-07-20 | End: 2021-07-21 | Stop reason: HOSPADM

## 2021-07-20 RX ORDER — METOPROLOL TARTRATE 5 MG/5ML
INJECTION INTRAVENOUS AS NEEDED
Status: DISCONTINUED | OUTPATIENT
Start: 2021-07-20 | End: 2021-07-20 | Stop reason: HOSPADM

## 2021-07-20 RX ORDER — CEFAZOLIN SODIUM 1 G/3ML
INJECTION, POWDER, FOR SOLUTION INTRAMUSCULAR; INTRAVENOUS AS NEEDED
Status: DISCONTINUED | OUTPATIENT
Start: 2021-07-20 | End: 2021-07-20 | Stop reason: HOSPADM

## 2021-07-20 RX ORDER — HYDROCODONE BITARTRATE AND ACETAMINOPHEN 7.5; 325 MG/1; MG/1
1 TABLET ORAL
Status: DISCONTINUED | OUTPATIENT
Start: 2021-07-20 | End: 2021-07-21 | Stop reason: HOSPADM

## 2021-07-20 RX ORDER — PROPOFOL 10 MG/ML
INJECTION, EMULSION INTRAVENOUS AS NEEDED
Status: DISCONTINUED | OUTPATIENT
Start: 2021-07-20 | End: 2021-07-20 | Stop reason: HOSPADM

## 2021-07-20 RX ORDER — EPHEDRINE SULFATE/0.9% NACL/PF 50 MG/5 ML
SYRINGE (ML) INTRAVENOUS AS NEEDED
Status: DISCONTINUED | OUTPATIENT
Start: 2021-07-20 | End: 2021-07-20 | Stop reason: HOSPADM

## 2021-07-20 RX ORDER — SODIUM CHLORIDE 0.9 % (FLUSH) 0.9 %
5-40 SYRINGE (ML) INJECTION EVERY 8 HOURS
Status: DISCONTINUED | OUTPATIENT
Start: 2021-07-20 | End: 2021-07-20 | Stop reason: SDUPTHER

## 2021-07-20 RX ORDER — SODIUM CHLORIDE 0.9 % (FLUSH) 0.9 %
5-40 SYRINGE (ML) INJECTION AS NEEDED
Status: DISCONTINUED | OUTPATIENT
Start: 2021-07-20 | End: 2021-07-21 | Stop reason: HOSPADM

## 2021-07-20 RX ORDER — LIDOCAINE HYDROCHLORIDE 10 MG/ML
INJECTION INFILTRATION; PERINEURAL AS NEEDED
Status: DISCONTINUED | OUTPATIENT
Start: 2021-07-20 | End: 2021-07-20 | Stop reason: HOSPADM

## 2021-07-20 RX ORDER — SODIUM CHLORIDE, SODIUM LACTATE, POTASSIUM CHLORIDE, CALCIUM CHLORIDE 600; 310; 30; 20 MG/100ML; MG/100ML; MG/100ML; MG/100ML
INJECTION, SOLUTION INTRAVENOUS
Status: DISCONTINUED | OUTPATIENT
Start: 2021-07-20 | End: 2021-07-20 | Stop reason: HOSPADM

## 2021-07-20 RX ORDER — SUCCINYLCHOLINE CHLORIDE 20 MG/ML
INJECTION INTRAMUSCULAR; INTRAVENOUS AS NEEDED
Status: DISCONTINUED | OUTPATIENT
Start: 2021-07-20 | End: 2021-07-20 | Stop reason: HOSPADM

## 2021-07-20 RX ORDER — ONDANSETRON 2 MG/ML
INJECTION INTRAMUSCULAR; INTRAVENOUS AS NEEDED
Status: DISCONTINUED | OUTPATIENT
Start: 2021-07-20 | End: 2021-07-20 | Stop reason: HOSPADM

## 2021-07-20 RX ORDER — ROCURONIUM BROMIDE 10 MG/ML
INJECTION, SOLUTION INTRAVENOUS AS NEEDED
Status: DISCONTINUED | OUTPATIENT
Start: 2021-07-20 | End: 2021-07-20 | Stop reason: HOSPADM

## 2021-07-20 RX ADMIN — Medication 10 MG: at 17:18

## 2021-07-20 RX ADMIN — LIDOCAINE HYDROCHLORIDE 100 MG: 20 INJECTION, SOLUTION EPIDURAL; INFILTRATION; INTRACAUDAL; PERINEURAL at 16:57

## 2021-07-20 RX ADMIN — SODIUM CHLORIDE 125 ML/HR: 9 INJECTION, SOLUTION INTRAVENOUS at 07:31

## 2021-07-20 RX ADMIN — Medication 10 ML: at 21:01

## 2021-07-20 RX ADMIN — HYDROMORPHONE HYDROCHLORIDE 1 MG: 1 INJECTION, SOLUTION INTRAMUSCULAR; INTRAVENOUS; SUBCUTANEOUS at 15:21

## 2021-07-20 RX ADMIN — PROPOFOL 30 MG: 10 INJECTION, EMULSION INTRAVENOUS at 16:58

## 2021-07-20 RX ADMIN — SUCCINYLCHOLINE CHLORIDE 120 MG: 20 INJECTION, SOLUTION INTRAMUSCULAR; INTRAVENOUS at 16:57

## 2021-07-20 RX ADMIN — Medication 10 MG: at 17:15

## 2021-07-20 RX ADMIN — HYDROMORPHONE HYDROCHLORIDE 1 MG: 1 INJECTION, SOLUTION INTRAMUSCULAR; INTRAVENOUS; SUBCUTANEOUS at 23:50

## 2021-07-20 RX ADMIN — Medication 10 ML: at 19:53

## 2021-07-20 RX ADMIN — ONDANSETRON 4 MG: 2 INJECTION INTRAMUSCULAR; INTRAVENOUS at 17:09

## 2021-07-20 RX ADMIN — ROCURONIUM BROMIDE 20 MG: 50 INJECTION, SOLUTION INTRAVENOUS at 17:36

## 2021-07-20 RX ADMIN — ROCURONIUM BROMIDE 5 MG: 50 INJECTION, SOLUTION INTRAVENOUS at 16:57

## 2021-07-20 RX ADMIN — Medication 10 ML: at 14:57

## 2021-07-20 RX ADMIN — Medication 10 ML: at 05:50

## 2021-07-20 RX ADMIN — PROPOFOL 150 MG: 10 INJECTION, EMULSION INTRAVENOUS at 16:57

## 2021-07-20 RX ADMIN — SODIUM CHLORIDE, POTASSIUM CHLORIDE, SODIUM LACTATE AND CALCIUM CHLORIDE: 600; 310; 30; 20 INJECTION, SOLUTION INTRAVENOUS at 16:52

## 2021-07-20 RX ADMIN — FENTANYL CITRATE 100 MCG: 50 INJECTION, SOLUTION INTRAMUSCULAR; INTRAVENOUS at 16:57

## 2021-07-20 RX ADMIN — SODIUM CHLORIDE, POTASSIUM CHLORIDE, SODIUM LACTATE AND CALCIUM CHLORIDE: 600; 310; 30; 20 INJECTION, SOLUTION INTRAVENOUS at 18:01

## 2021-07-20 RX ADMIN — SUGAMMADEX 200 MG: 100 INJECTION, SOLUTION INTRAVENOUS at 18:05

## 2021-07-20 RX ADMIN — SODIUM CHLORIDE 125 ML/HR: 9 INJECTION, SOLUTION INTRAVENOUS at 15:48

## 2021-07-20 RX ADMIN — HYDROMORPHONE HYDROCHLORIDE 1 MG: 1 INJECTION, SOLUTION INTRAMUSCULAR; INTRAVENOUS; SUBCUTANEOUS at 19:53

## 2021-07-20 RX ADMIN — METOPROLOL TARTRATE 2.5 MG: 5 INJECTION, SOLUTION INTRAVENOUS at 17:42

## 2021-07-20 RX ADMIN — CEFAZOLIN SODIUM 2 G: 1 INJECTION, POWDER, FOR SOLUTION INTRAMUSCULAR; INTRAVENOUS at 17:09

## 2021-07-20 RX ADMIN — ROCURONIUM BROMIDE 15 MG: 50 INJECTION, SOLUTION INTRAVENOUS at 17:09

## 2021-07-20 RX ADMIN — DEXAMETHASONE SODIUM PHOSPHATE 4 MG: 4 INJECTION, SOLUTION INTRA-ARTICULAR; INTRALESIONAL; INTRAMUSCULAR; INTRAVENOUS; SOFT TISSUE at 17:09

## 2021-07-20 NOTE — ANESTHESIA POSTPROCEDURE EVALUATION
Procedure(s):  Laparoscopic Spigelian Hernia Repair With Mesh. No value filed.     Anesthesia Post Evaluation      Multimodal analgesia: multimodal analgesia used between 6 hours prior to anesthesia start to PACU discharge  Patient location during evaluation: bedside  Patient participation: complete - patient participated  Level of consciousness: awake and alert  Pain score: 2  Pain management: satisfactory to patient  Airway patency: patent  Anesthetic complications: no  Cardiovascular status: acceptable  Respiratory status: acceptable  Hydration status: acceptable  Post anesthesia nausea and vomiting:  none  Final Post Anesthesia Temperature Assessment:  Normothermia (36.0-37.5 degrees C)      INITIAL Post-op Vital signs:   Vitals Value Taken Time   /76 07/20/21 1830   Temp 36.6 °C (97.9 °F) 07/20/21 1816   Pulse 83 07/20/21 1830   Resp 12 07/20/21 1830   SpO2 97 % 07/20/21 1830

## 2021-07-20 NOTE — PROGRESS NOTES
PROGRESS NOTE      Chief Complaints:  Patient examined this morning. Patient says she is much better she has NG tube in place. HPI and  Objective:    Patient denies abdominal pain. Denies any fever chills generalized weakness denies any chest pain shortness of breath. Patient has CT scan abdomen pelvis yesterday showed resolution of small bowel however the small bowel intestines still are stuck into the speckling hernia. There is no free fluid overload or significant pallor. Review of Systems:  Rest of review of system negative, personally reviewed. EXAM:  Visit Vitals  /63 (BP 1 Location: Left upper arm, BP Patient Position: At rest;Supine)   Pulse 71   Temp 98.1 °F (36.7 °C)   Resp 18   Ht 5' 5\" (1.651 m)   Wt 170 lb (77.1 kg)   SpO2 97%   BMI 28.29 kg/m²     Patient is awake and alert  Head and neck atraumatic  Cardiac is regular rate  Pulmonary no audible wheeze  Abdomen soft nontender distended  Skin is warm and moist.    No results found for this or any previous visit (from the past 24 hour(s)). ASSESSMENT:   Patient is 77 y. o. with diagnosis of : Active Problems:    Spigelian hernia with bowel obstruction (7/18/2021)      Bowel obstruction ([address] (7/19/2021)        PLAN:                 Most likely patient has incarcerated rather than strangulated speculating hernia with small bowel content. Clinically she looks better small bowel obstruction is resolved with NG tube. But she will need a definitive surgery including option I discussed with the patient and family is diagnostic laparoscopy procedure with reduction of the spigelian hernia with a mesh implantation. We talked about details about surgical techniques, rational for the surgery risk of benefits and complications. Patient is scheduled for this procedure later today.

## 2021-07-20 NOTE — BRIEF OP NOTE
Brief Postoperative Note    Patient: Su Silvestre  YOB: 1945  MRN: [id number]    Date of Procedure: 7/20/2021     Pre-Op Diagnosis: Spigelian Hernia    Post-Op Diagnosis: same and preperitoneal mass 2 cm, next to spigelian hernia on the right    Procedure(s):  Laparoscopic Spigelian Hernia Repair With Mesh, physix  Excision of mass preperitoneal    Surgeon(s):  Abimbola Alan MD    Surgical Assistant: Surg Asst-1: Deneen Manning    Anesthesia: General     Estimated Blood Loss (mL): none  Complications: none  Specimens:   ID Type Source Tests Collected by Time Destination   1 : peritoneal  Preservative Peritoneum  Abimbola Alan MD 7/20/2021 1800 Pathology        Implants:   Implant Name Type Inv. Item Serial No.  Lot No. LRB No. Used Action   MESH SURG DIA4. 5IN CIR W/ ECHO 2 POS SYS VENTRALIGHT - SN/A  MESH SURG DIA4. 5IN CIR W/ ECHO 2 POS SYS VENTRALIGHT N/A BARD DAVOL_WD [id number] N/A 1 Implanted       Drains:   [REMOVED] Nasogastric Tube 07/18/21 (Removed)   Site Assessment Clean, dry, & intact 07/19/21 0225   Securement Device Adhesive-based mcfarlane 07/18/21 2320   G Port Status Intermittent Suction 07/19/21 0225   Action Taken Placement verified (comment) 07/18/21 2320   Drainage Description Yellow 07/18/21 2320   Gastric Residual (mL) 200 ml 07/18/21 2320   Water Flush Volume (mL) 100 mL 07/18/21 2320   Output (ml) 700 ml 07/20/21 0554       Findings: as above    Electronically Signed by Aaron Jones MD on 7/20/2021 at 6:16 PM

## 2021-07-21 VITALS
HEART RATE: 81 BPM | WEIGHT: 170 LBS | HEIGHT: 65 IN | DIASTOLIC BLOOD PRESSURE: 66 MMHG | BODY MASS INDEX: 28.32 KG/M2 | OXYGEN SATURATION: 98 % | TEMPERATURE: 98.9 F | SYSTOLIC BLOOD PRESSURE: 131 MMHG | RESPIRATION RATE: 18 BRPM

## 2021-07-21 PROCEDURE — 74011250636 HC RX REV CODE- 250/636: Performed by: SURGERY

## 2021-07-21 PROCEDURE — 99024 POSTOP FOLLOW-UP VISIT: CPT | Performed by: SURGERY

## 2021-07-21 PROCEDURE — 74011250637 HC RX REV CODE- 250/637: Performed by: SURGERY

## 2021-07-21 RX ORDER — HYDROCODONE BITARTRATE AND ACETAMINOPHEN 7.5; 325 MG/1; MG/1
1 TABLET ORAL
Qty: 21 TABLET | Refills: 0 | Status: SHIPPED | OUTPATIENT
Start: 2021-07-21 | End: 2021-07-28

## 2021-07-21 RX ADMIN — HYDROMORPHONE HYDROCHLORIDE 1 MG: 1 INJECTION, SOLUTION INTRAMUSCULAR; INTRAVENOUS; SUBCUTANEOUS at 04:06

## 2021-07-21 RX ADMIN — Medication 10 ML: at 05:18

## 2021-07-21 RX ADMIN — HYDROCODONE BITARTRATE AND ACETAMINOPHEN 1 TABLET: 7.5; 325 TABLET ORAL at 10:52

## 2021-07-21 NOTE — PROGRESS NOTES
Length of stay skin assessment performed by author and ELIZABETH Seth RN. Skin is dry and intact except for 3 small abdominal incisions which are covered with bandaids.

## 2021-07-21 NOTE — DISCHARGE INSTRUCTIONS
Patient Education        Abdominal Hernia Repair: What to Expect at Home  Your Recovery  After surgery to repair your hernia, you are likely to have pain for a few days. You may also feel tired and have less energy than normal. This is common. You should feel better after a few days and will probably feel much better in 7 days. For several weeks you may feel discomfort or pulling in the hernia repair when you move. You may have some bruising around the area of the repair. This is normal.  This care sheet gives you a general idea about how long it will take for you to recover. But each person recovers at a different pace. Follow the steps below to get better as quickly as possible. How can you care for yourself at home? Activity    · Rest when you feel tired. Getting enough sleep will help you recover.     · Try to walk each day. Start by walking a little more than you did the day before. Bit by bit, increase the amount you walk. Walking boosts blood flow and helps prevent pneumonia and constipation.     · If your doctor gives you an abdominal binder to wear, use it as directed. This is an elastic bandage that wraps around your belly and upper hips. It helps support your belly muscles after surgery.     · Avoid strenuous activities, such as biking, jogging, weight lifting, or aerobic exercise, until your doctor says it is okay.     · Avoid lifting anything that would make you strain. This may include heavy grocery bags and milk containers, a heavy briefcase or backpack, cat litter or dog food bags, a vacuum , or a child.     · Ask your doctor when you can drive again.     · Most people are able to return to work within 1 to 2 weeks after surgery. But if your job requires that you do heavy lifting or strenuous activity, you may need to take 4 to 6 weeks off from work.     · You may shower 24 to 48 hours after surgery, if your doctor okays it. Pat the cut (incision) dry.  Do not take a bath for the first 2 weeks, or until your doctor tells you it is okay.     · Ask your doctor when it is okay for you to have sex. Diet    · You can eat your normal diet. If your stomach is upset, try bland, low-fat foods like plain rice, broiled chicken, toast, and yogurt.     · Drink plenty of fluids (unless your doctor tells you not to).     · You may notice that your bowel movements are not regular right after your surgery. This is common. Avoid constipation and straining with bowel movements. You may want to take a fiber supplement every day. If you have not had a bowel movement after a couple of days, ask your doctor about taking a mild laxative. Medicines    · Your doctor will tell you if and when you can restart your medicines. You will also be given instructions about taking any new medicines.     · If you take aspirin or some other blood thinner, ask your doctor if and when to start taking it again. Make sure that you understand exactly what your doctor wants you to do.     · Be safe with medicines. Take pain medicines exactly as directed. ? If the doctor gave you a prescription medicine for pain, take it as prescribed. ? If you are not taking a prescription pain medicine, ask your doctor if you can take an over-the-counter medicine.     · If your doctor prescribed antibiotics, take them as directed. Do not stop taking them just because you feel better. You need to take the full course of antibiotics.     · If you think your pain medicine is making you sick to your stomach:  ? Take your medicine after meals (unless your doctor has told you not to). ? Ask your doctor for a different pain medicine. Incision care    · If you have strips of tape on the cut (incision) the doctor made, leave the tape on for a week or until it falls off.  Or follow your doctor's instructions for removing the tape.     · If you have staples closing the cut, you will need to visit your doctor in 1 to 2 weeks to have them removed.     · Wash the area daily with warm, soapy water, and pat it dry. Don't use hydrogen peroxide or alcohol, which can slow healing. You may cover the area with a gauze bandage if it weeps or rubs against clothing. Change the bandage every day. Other instructions    · Hold a pillow over your incision when you cough or take deep breaths. This will support your belly and decrease your pain.     · Do breathing exercises at home as instructed by your doctor. This will help prevent pneumonia.     · If you had laparoscopic surgery, you may also have pain in your shoulder. The pain usually lasts about a day or two. Follow-up care is a key part of your treatment and safety. Be sure to make and go to all appointments, and call your doctor if you are having problems. It's also a good idea to know your test results and keep a list of the medicines you take. When should you call for help? Call 911 anytime you think you may need emergency care. For example, call if:    · You passed out (lost consciousness).     · You are short of breath. Call your doctor now or seek immediate medical care if:    · You are sick to your stomach and cannot drink fluids.     · You have signs of a blood clot in your leg (called a deep vein thrombosis), such as:  ? Pain in your calf, back of the knee, thigh, or groin. ? Redness and swelling in your leg or groin.     · You have signs of infection, such as:  ? Increased pain, swelling, warmth, or redness. ? Red streaks leading from the incision. ? Pus draining from the incision. ? A fever.     · You cannot pass stools or gas.     · You have pain that does not get better after you take pain medicine.     · You have loose stitches, or your incision comes open.     · Bright red blood has soaked through the bandage over your incision. Watch closely for changes in your health, and be sure to contact your doctor if you have any problems. Where can you learn more?   Go to http://www.gray.com/  Enter B577 in the search box to learn more about \"Abdominal Hernia Repair: What to Expect at Home. \"  Current as of: April 15, 2020               Content Version: 12.8  © 2006-2021 [employer]. Care instructions adapted under license by [employer] (which disclaims liability or warranty for this information). If you have questions about a medical condition or this instruction, always ask your healthcare professional. [place] [address] any warranty or liability for your use of this information. Change Band-Aid every day. Patient may take shower. Patient was advised not to do any heavy lifting or strenuous exercise until seen by Dr. Pranay Shaver first postop follow-up.

## 2021-07-21 NOTE — OP NOTES
This is operative report on Pao Boateng, patient's YOB: 1945. Date of surgery: July 20, 2021. Surgeon: Dr. Chana Whitlock. Preop diagnosis:  1. Small bowel obstruction. 2.  Right lower quadrant abdominal wall spigelian hernia. Postop diagnosis:  1. Same as above. 2.  Preperitoneal soft tissue mass right next to the hernia defect. Procedure:  1. Laparoscopic right lower quadrant abdominal wall spigelian hernia repair with mesh implantation. Phasix mesh was utilized. 2.  Excision of the soft tissue mass right lower quadrant preperitoneal mass. Mass size is approximate 2.5 cm diameter. Anesthesia: General.  Anesthesiologist: Dr. Denise Ceja  Assistant: Please see the OR record  EBL: Minimal.  Complications: None  Specimen as described above  EBL 20 cc. Urine output and fluids: Please see anesthesia record  Condition stable:  Disposition: PACU. Indication for surgery: Ms. Jennifer Lea is currently hospitalized with a small bowel obstruction from spigelian hernia right lower quadrant area. Bowel obstruction improved repeat CT scan shows improved small bowel obstruction at this time patient scheduled for laparoscopic examination of the right lower quadrant abdominal wall area where she has spigelian hernia. Patient was discussed rationale for the surgery risk benefits complications patient has a signed surgical consent. Description of procedure: Patient was brought to the operating table, the supine position followed by anesthesia was initiated. Followed by abdomen was prepped usual sterile technique using DuraPrep's followed by sterile drapes were placed usual fashion. At this time timeout was called after confirmation was made procedure commenced. I used 1% lidocaine for local anesthetic. I made a small supraumbilical incision was trocar placement.   Alabaster trochars were placed usual fashion followed by pneumoperitoneum was insufflated up to 15 bry followed by additional 5 mm trochars placed suprapubic and left lower quadrant area. Followed by right lower quadrant area and abdominal wall areas examined. There is hernia defect noted. However there is no bowel content and the most likely has reduced by its own, however there is soft tissue preperitoneal mass was noted right next to the hernia defect measured about 2.5 cm. So I had to excise this. So using electrocautery entire hernia sac and the mass was excised with the peritoneal layer. .  Arterial layer was undermined followed by Phasix mesh was appropriate fashion approximately 5 cm diameter and the implanted the preperitoneal space and used dissolvable Endo stapler device to secure the mesh and peritoneum. Followed by rest of trochars removed under direct visualization's and the trochars removed and the umbilical port site was closed with 0 Vicryl sutures rest of skin was closed with a formal sutures appropriate sterile dressing applied. Patient tolerate procedure without any complications.

## 2021-07-21 NOTE — PROGRESS NOTES
Discharge instructions completed and reviewed with pt and pt spouse. Pt aware of follow ups and discharge medications. Denies any questions. Post op education handouts given and reviewed with pt regarding hernia repair. IV removed. Pt dressed and all personal belongings packed and sent home with pt. Pt discharged home/self care via personal vehicle. Pt stable upon discharge.

## 2021-07-21 NOTE — DISCHARGE SUMMARY
.  This discharge summary for Jai Edwards, patient YOB: 1945. Patient was admitted to hospital on July 19, 2021 with a small bowel obstruction with spigelian hernia. This was managed conservatively initially with n.p.o. bowel rest.  Small bowel was decompressed followed by patient underwent laparoscopic spaghetti hernia repair with mesh. 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 Postop patient seems doing okay tolerating full liquid diet vital signs stable. Will advance diet today if patient tolerate diet without any problems patient can. Discharge later today. And I will see her back follow-up postop in 2 weeks.

## 2021-08-09 ENCOUNTER — OFFICE VISIT (OUTPATIENT)
Dept: SURGERY | Age: 76
End: 2021-08-09
Payer: MEDICARE

## 2021-08-09 VITALS
OXYGEN SATURATION: 98 % | SYSTOLIC BLOOD PRESSURE: 154 MMHG | TEMPERATURE: 97.8 F | RESPIRATION RATE: 16 BRPM | HEIGHT: 65 IN | BODY MASS INDEX: 28.09 KG/M2 | HEART RATE: 81 BPM | DIASTOLIC BLOOD PRESSURE: 76 MMHG | WEIGHT: 168.6 LBS

## 2021-08-09 DIAGNOSIS — K56.609 INTESTINAL OBSTRUCTION, UNSPECIFIED CAUSE, UNSPECIFIED WHETHER PARTIAL OR COMPLETE (HCC): Primary | ICD-10-CM

## 2021-08-09 PROCEDURE — 99024 POSTOP FOLLOW-UP VISIT: CPT | Performed by: SURGERY

## 2021-08-09 NOTE — PROGRESS NOTES
Chief Complaint   Patient presents with    Follow-up     S/P Hernia Repair 7/18/2021     Visit Vitals  BP (!) 154/76 (BP 1 Location: Left arm, BP Patient Position: Sitting, BP Cuff Size: Adult)   Pulse 81   Temp 97.8 °F (36.6 °C) (Temporal)   Resp 16   Ht 5' 5\" (1.651 m)   Wt 168 lb 9.6 oz (76.5 kg)   SpO2 98%   BMI 28.06 kg/m²     1. Have you been to the ER, urgent care clinic since your last visit? Hospitalized since your last visit? No    2. Have you seen or consulted any other health care providers outside of the [address] [place] [address] since your last visit? Include any pap smears or colon screening.  No

## 2021-08-12 NOTE — PROGRESS NOTES
Subjective:      Elian Gay is a 77 y.o. female who presents today for wound check. Laparoscopic spigelian hernia repair. Objective:     Visit Vitals  BP (!) 154/76 (BP 1 Location: Left arm, BP Patient Position: Sitting, BP Cuff Size: Adult)   Pulse 81   Temp 97.8 °F (36.6 °C) (Temporal)   Resp 16   Ht 5' 5\" (1.651 m)   Wt 168 lb 9.6 oz (76.5 kg)   SpO2 98%   BMI 28.06 kg/m²       Wound exam:  Wounds are clean and dry. Assessment:   Etiology of Wound:  Spigelian hernia repair  Plan:   Patient is doing well. Pathology report discussed. Patient will be reevaluated 3-month follow-up postop.

## 2021-08-27 RX ORDER — MONTELUKAST SODIUM 10 MG/1
TABLET ORAL
Qty: 90 TABLET | Refills: 0 | Status: SHIPPED | OUTPATIENT
Start: 2021-08-27 | End: 2022-05-02

## 2021-09-23 NOTE — ANESTHESIA PREPROCEDURE EVALUATION
Relevant Problems   No relevant active problems       Anesthetic History   No history of anesthetic complications            Review of Systems / Medical History  Patient summary reviewed, nursing notes reviewed and pertinent labs reviewed    Pulmonary  Within defined limits                 Neuro/Psych   Within defined limits           Cardiovascular  Within defined limits                     GI/Hepatic/Renal  Within defined limits              Endo/Other        Arthritis and cancer     Other Findings            Past Medical History:   Diagnosis Date    Arthritis        Past Surgical History:   Procedure Laterality Date    HX BREAST BIOPSY      HX BREAST BIOPSY Left 10/28/2020    Left Breast Wire Localized Excisional Biopsy performed by Kira Valenzuela MD at [facility] HX COLONOSCOPY      HX GYN      hysterectomy    HX HERNIA REPAIR      HX HERNIA REPAIR      HX HERNIA REPAIR  07/18/2021    HX ORTHOPAEDIC      knee       Current Outpatient Medications   Medication Instructions    calcium-cholecalciferol, d3, (CALCIUM 600 + D) 600-125 mg-unit tab 600 mg, Oral, DAILY    dicyclomine (BENTYL) 10 mg, Oral, 2 TIMES DAILY    echinacea 400 mg, Oral, DAILY    homeopathic drugs (MISC NATURAL PRODUCT OP) Oral, High potency grapine     hyoscyamine SL (LEVSIN/SL) 0.125 mg, SubLINGual, EVERY 4 HOURS AS NEEDED    magnesium 250 mg, Oral, DAILY    montelukast (SINGULAIR) 10 mg tablet Take 1 tablet every day by oral route.  naproxen sodium (ALEVE) 220 mg, Oral, 2 TIMES DAILY WITH MEALS       Current Outpatient Medications   Medication Sig    montelukast (SINGULAIR) 10 mg tablet Take 1 tablet every day by oral route.  homeopathic drugs (MISC NATURAL PRODUCT OP) Take  by mouth. High potency grapine    dicyclomine (BENTYL) 10 mg capsule Take 10 mg by mouth two (2) times a day.  naproxen sodium (Aleve) 220 mg tablet Take 220 mg by mouth two (2) times daily (with meals).     hyoscyamine SL (LEVSIN/SL) 0.125 mg SL tablet 0.125 mg by SubLINGual route every four (4) hours as needed for Cramping.  calcium-cholecalciferol, d3, (CALCIUM 600 + D) 600-125 mg-unit tab Take 600 mg by mouth daily.  echinacea 400 mg cap Take 400 mg by mouth daily.  magnesium 250 mg tab Take 250 mg by mouth daily. No current facility-administered medications for this visit. No data found.     Lab Results   Component Value Date/Time    WBC 7.8 07/20/2021 11:41 AM    HGB 11.5 07/20/2021 11:41 AM    HCT 37.0 07/20/2021 11:41 AM    PLATELET 757 49/96/9001 11:41 AM    MCV 84.7 07/20/2021 11:41 AM     Lab Results   Component Value Date/Time    Sodium 140 07/20/2021 11:41 AM    Potassium 3.5 07/20/2021 11:41 AM    Chloride 109 (H) 07/20/2021 11:41 AM    CO2 22 07/20/2021 11:41 AM    Anion gap 9 07/20/2021 11:41 AM    Glucose 66 07/20/2021 11:41 AM    BUN 20 07/20/2021 11:41 AM    Creatinine 0.49 (L) 07/20/2021 11:41 AM    BUN/Creatinine ratio 41 (H) 07/20/2021 11:41 AM    GFR est AA >60 07/20/2021 11:41 AM    GFR est non-AA >60 07/20/2021 11:41 AM    Calcium 8.5 07/20/2021 11:41 AM     No results found for: APTT, PTP, INR, INREXT, INREXT  Lab Results   Component Value Date/Time    Glucose 66 07/20/2021 11:41 AM     Physical Exam    Airway  Mallampati: I  TM Distance: 4 - 6 cm  Neck ROM: normal range of motion   Mouth opening: Normal     Cardiovascular    Rhythm: regular  Rate: normal         Dental    Dentition: Upper partial plate and Lower partial plate     Pulmonary  Breath sounds clear to auscultation               Abdominal  GI exam deferred       Other Findings            Anesthetic Plan    ASA: 3  Anesthesia type: general          Induction: Intravenous  Anesthetic plan and risks discussed with: Patient and Family

## 2021-10-07 ENCOUNTER — OFFICE VISIT (OUTPATIENT)
Dept: SURGERY | Age: 76
End: 2021-10-07
Payer: MEDICARE

## 2021-10-07 VITALS
HEART RATE: 85 BPM | SYSTOLIC BLOOD PRESSURE: 169 MMHG | WEIGHT: 170 LBS | BODY MASS INDEX: 28.32 KG/M2 | HEIGHT: 65 IN | OXYGEN SATURATION: 97 % | DIASTOLIC BLOOD PRESSURE: 89 MMHG | TEMPERATURE: 98.4 F

## 2021-10-07 DIAGNOSIS — K56.609 INTESTINAL OBSTRUCTION, UNSPECIFIED CAUSE, UNSPECIFIED WHETHER PARTIAL OR COMPLETE (HCC): Primary | ICD-10-CM

## 2021-10-07 PROCEDURE — G8400 PT W/DXA NO RESULTS DOC: HCPCS | Performed by: SURGERY

## 2021-10-07 PROCEDURE — 1101F PT FALLS ASSESS-DOCD LE1/YR: CPT | Performed by: SURGERY

## 2021-10-07 PROCEDURE — G9899 SCRN MAM PERF RSLTS DOC: HCPCS | Performed by: SURGERY

## 2021-10-07 PROCEDURE — 3017F COLORECTAL CA SCREEN DOC REV: CPT | Performed by: SURGERY

## 2021-10-07 PROCEDURE — 99024 POSTOP FOLLOW-UP VISIT: CPT | Performed by: SURGERY

## 2021-10-07 PROCEDURE — G8536 NO DOC ELDER MAL SCRN: HCPCS | Performed by: SURGERY

## 2021-10-07 PROCEDURE — 1090F PRES/ABSN URINE INCON ASSESS: CPT | Performed by: SURGERY

## 2021-10-07 PROCEDURE — G8432 DEP SCR NOT DOC, RNG: HCPCS | Performed by: SURGERY

## 2021-10-07 PROCEDURE — G8427 DOCREV CUR MEDS BY ELIG CLIN: HCPCS | Performed by: SURGERY

## 2021-10-07 PROCEDURE — G8419 CALC BMI OUT NRM PARAM NOF/U: HCPCS | Performed by: SURGERY

## 2021-10-07 NOTE — PROGRESS NOTES
Chief Complaint   Patient presents with    Follow-up     hernia repair     Visit Vitals  BP (!) 169/89 (BP 1 Location: Left arm, BP Patient Position: Sitting, BP Cuff Size: Adult)   Pulse 85   Temp 98.4 °F (36.9 °C) (Temporal)   Ht 5' 5\" (1.651 m)   Wt 170 lb (77.1 kg)   SpO2 97%   BMI 28.29 kg/m²     1. Have you been to the ER, urgent care clinic since your last visit? Hospitalized since your last visit? No    2. Have you seen or consulted any other health care providers outside of the [address] [place] [address] since your last visit? Include any pap smears or colon screening.  No

## 2021-10-08 NOTE — PROGRESS NOTES
VASCULAR FOLLOW UP      Subjective:   CHIEF COMPLAINTS:  Pain left lower quadrant  PRESENTATION OF ILLNESS:    Mr. Family had laparoscopic spigelian hernia repair done previously now she is coming today with a left lower quadrant pain and sensation of the lump left lower quadrant area. Patient denies any obstructive symptoms. The symptoms now currently resolved by itself. Past Medical History:   Diagnosis Date    Arthritis       Past Surgical History:   Procedure Laterality Date    HX BREAST BIOPSY      HX BREAST BIOPSY Left 10/28/2020    Left Breast Wire Localized Excisional Biopsy performed by Marge Bustamante MD at [facility] HX COLONOSCOPY      HX GYN      hysterectomy    HX HERNIA REPAIR      HX HERNIA REPAIR      HX HERNIA REPAIR  07/18/2021    HX ORTHOPAEDIC      knee     Family History   Problem Relation Age of Onset    Cancer Mother     Cancer Father     Heart Disease Father       Social History     Tobacco Use    Smoking status: Never Smoker    Smokeless tobacco: Never Used   Substance Use Topics    Alcohol use: Yes     Alcohol/week: 1.0 standard drinks     Types: 1 Glasses of wine per week       Prior to Admission medications    Medication Sig Start Date End Date Taking? Authorizing Provider   montelukast (SINGULAIR) 10 mg tablet Take 1 tablet every day by oral route. 8/27/21   Maddy Bella MD   homeopathic drugs (MISC NATURAL PRODUCT OP) Take  by mouth. High potency grapine    Provider, Historical   dicyclomine (BENTYL) 10 mg capsule Take 10 mg by mouth two (2) times a day. Provider, Historical   naproxen sodium (Aleve) 220 mg tablet Take 220 mg by mouth two (2) times daily (with meals). Provider, Historical   hyoscyamine SL (LEVSIN/SL) 0.125 mg SL tablet 0.125 mg by SubLINGual route every four (4) hours as needed for Cramping. Provider, Historical   calcium-cholecalciferol, d3, (CALCIUM 600 + D) 600-125 mg-unit tab Take 600 mg by mouth daily.     Provider, Historical echinacea 400 mg cap Take 400 mg by mouth daily. Provider, Historical   magnesium 250 mg tab Take 250 mg by mouth daily. Provider, Historical     No Known Allergies     Review of Systems:  I reviewed the rest of organ systems personally and they were negative signed by Dr. Carleen Thibodeaux    Objective:     Visit Vitals  BP (!) 169/89 (BP 1 Location: Left arm, BP Patient Position: Sitting, BP Cuff Size: Adult)   Pulse 85   Temp 98.4 °F (36.9 °C) (Temporal)   Ht 5' 5\" (1.651 m)   Wt 170 lb (77.1 kg)   SpO2 97%   BMI 28.29 kg/m²     VITAL SIGNS REVIEWED. Physical Exam:  Patient is well-nourished pleasant in conversation is appropriate. Head and neck examination atraumatic, normocephalic. Gaze appropriate. Conversation appropriate. Neck examination shows supple. No mass. No obvious carotid bruit. Chest examination shows lungs are clear bilaterally well-expanded, no crackles or wheezes. Cardiovascular system regular rate, no obvious murmur. Skin warm to touch  and moist, no skin lesions. Abdomen is soft ,not tender or distended bowel sounds present. No palpable mass. Neurological examinations, no focal neuro deficits moving all 4 extremities. Cranial nerves intact. Sensation is intact as well. Hematologic: No obvious bruise or swelling or obvious lymphadenopathy. Psychosocial: Appropriate. Has good effect. Musculoskeletal system: No muscle wasting, appropriate movements upper and lower extremity. V        Data Review:   No visits with results within 1 Month(s) from this visit.    Latest known visit with results is:   Admission on 07/18/2021, Discharged on 07/21/2021   Component Date Value Ref Range Status    WBC 07/18/2021 8.1  3.6 - 11.0 K/uL Final    RBC 07/18/2021 4.84  3.80 - 5.20 M/uL Final    HGB 07/18/2021 13.1  11.5 - 16.0 g/dL Final    HCT 07/18/2021 40.2  35.0 - 47.0 % Final    MCV 07/18/2021 83.1  80.0 - 99.0 FL Final    MCH 07/18/2021 27.1  26.0 - 34.0 PG Final    MCHC 07/18/2021 32.6 30.0 - 36.5 g/dL Final    RDW 07/18/2021 14.2  11.5 - 14.5 % Final    PLATELET 07/38/1625 468  150 - 400 K/uL Final    MPV 07/18/2021 10.3  8.9 - 12.9 FL Final    NEUTROPHILS 07/18/2021 87* 32 - 75 % Final    LYMPHOCYTES 07/18/2021 10* 12 - 49 % Final    MONOCYTES 07/18/2021 3* 5 - 13 % Final    EOSINOPHILS 07/18/2021 0  0 - 7 % Final    BASOPHILS 07/18/2021 0  0 - 1 % Final    IMMATURE GRANULOCYTES 07/18/2021 0  0.0 - 0.5 % Final    ABS. NEUTROPHILS 07/18/2021 7.0  1.8 - 8.0 K/UL Final    ABS. LYMPHOCYTES 07/18/2021 0.8  0.8 - 3.5 K/UL Final    ABS. MONOCYTES 07/18/2021 0.2  0.0 - 1.0 K/UL Final    ABS. EOSINOPHILS 07/18/2021 0.0  0.0 - 0.4 K/UL Final    ABS. BASOPHILS 07/18/2021 0.0  0.0 - 0.1 K/UL Final    ABS. IMM. GRANS. 07/18/2021 0.0  0.00 - 0.04 K/UL Final    DF 07/18/2021 AUTOMATED    Final    Sodium 07/18/2021 140  136 - 145 mmol/L Final    Potassium 07/18/2021 3.9  3.5 - 5.1 mmol/L Final    Chloride 07/18/2021 103  97 - 108 mmol/L Final    CO2 07/18/2021 28  21 - 32 mmol/L Final    Anion gap 07/18/2021 9  5 - 15 mmol/L Final    Glucose 07/18/2021 115* 65 - 100 mg/dL Final    BUN 07/18/2021 16  6 - 20 mg/dL Final    Creatinine 07/18/2021 0.73  0.55 - 1.02 mg/dL Final    BUN/Creatinine ratio 07/18/2021 22* 12 - 20   Final    GFR est AA 07/18/2021 >60  >60 ml/min/1.73m2 Final    GFR est non-AA 07/18/2021 >60  >60 ml/min/1.73m2 Final    Calcium 07/18/2021 9.4  8.5 - 10.1 mg/dL Final    Protein, total 07/18/2021 8.1  6.4 - 8.2 g/dL Final    Albumin 07/18/2021 4.0  3.5 - 5.0 g/dL Final    Globulin 07/18/2021 4.1* 2.0 - 4.0 g/dL Final    A-G Ratio 07/18/2021 1.0* 1.1 - 2.2   Final    Bilirubin, total 07/18/2021 0.5  0.2 - 1.0 mg/dL Final    Bilirubin, direct 07/18/2021 0.1  0.0 - 0.2 mg/dL Final    Alk.  phosphatase 07/18/2021 77  45 - 117 U/L Final    AST (SGOT) 07/18/2021 17  15 - 37 U/L Final    ALT (SGPT) 07/18/2021 26  12 - 78 U/L Final    Lipase 07/18/2021 106  73 - 393 U/L Final    Troponin-I, Qt. 07/18/2021 <0.05  <0.05 ng/mL Final    Ventricular Rate 07/18/2021 64  BPM Final    Atrial Rate 07/18/2021 64  BPM Final    P-R Interval 07/18/2021 180  ms Final    QRS Duration 07/18/2021 94  ms Final    Q-T Interval 07/18/2021 414  ms Final    QTC Calculation (Bezet) 07/18/2021 427  ms Final    Calculated P Axis 07/18/2021 -18  degrees Final    Calculated R Axis 07/18/2021 3  degrees Final    Calculated T Axis 07/18/2021 65  degrees Final    Diagnosis 07/18/2021    Final                    Value:Normal sinus rhythm  Normal ECG  No previous ECGs available  Confirmed by MAVIS REID ([id number]) on 7/19/2021 12:32:23 PM      Color 07/18/2021 Yellow    Final    Appearance 07/18/2021 Clear  Clear   Final    Specific gravity 07/18/2021 1.010  1.003 - 1.030   Final    pH (UA) 07/18/2021 7.0  5.0 - 8.0   Final    Protein 07/18/2021 Negative  Negative mg/dL Final    Glucose 07/18/2021 Negative  Negative mg/dL Final    Ketone 07/18/2021 50* Negative mg/dL Final    Bilirubin 07/18/2021 Negative  Negative   Final    Blood 07/18/2021 Negative  Negative   Final    Urobilinogen 07/18/2021 0.1* 0.2 - 1.0 EU/dL Final    Nitrites 07/18/2021 Negative  Negative   Final    Leukocyte Esterase 07/18/2021 Negative  Negative   Final    WBC 07/18/2021 0-4  0 - 4 /hpf Final    RBC 07/18/2021 0-5  0 - 5 /hpf Final    Epithelial cells 07/18/2021 Few  Few /lpf Final    Bacteria 07/18/2021 1+* Negative /hpf Final    WBC 07/20/2021 7.8  3.6 - 11.0 K/uL Final    RBC 07/20/2021 4.37  3.80 - 5.20 M/uL Final    HGB 07/20/2021 11.5  11.5 - 16.0 g/dL Final    HCT 07/20/2021 37.0  35.0 - 47.0 % Final    MCV 07/20/2021 84.7  80.0 - 99.0 FL Final    MCH 07/20/2021 26.3  26.0 - 34.0 PG Final    MCHC 07/20/2021 31.1  30.0 - 36.5 g/dL Final    RDW 07/20/2021 14.4  11.5 - 14.5 % Final    PLATELET 89/35/2014 144  150 - 400 K/uL Final    MPV 07/20/2021 11.1  8.9 - 12.9 FL Final    NRBC 07/20/2021 0.0  0.0  WBC Final    ABSOLUTE NRBC 07/20/2021 0.00  0.00 - 0.01 K/uL Final    NEUTROPHILS 07/20/2021 79* 32 - 75 % Final    LYMPHOCYTES 07/20/2021 13  12 - 49 % Final    MONOCYTES 07/20/2021 7  5 - 13 % Final    EOSINOPHILS 07/20/2021 0  0 - 7 % Final    BASOPHILS 07/20/2021 1  0 - 1 % Final    IMMATURE GRANULOCYTES 07/20/2021 0  0 - 0.5 % Final    ABS. NEUTROPHILS 07/20/2021 6.2  1.8 - 8.0 K/UL Final    ABS. LYMPHOCYTES 07/20/2021 1.0  0.8 - 3.5 K/UL Final    ABS. MONOCYTES 07/20/2021 0.5  0.0 - 1.0 K/UL Final    ABS. EOSINOPHILS 07/20/2021 0.0  0.0 - 0.4 K/UL Final    ABS. BASOPHILS 07/20/2021 0.0  0.0 - 0.1 K/UL Final    ABS. IMM. GRANS. 07/20/2021 0.0  0.00 - 0.04 K/UL Final    DF 07/20/2021 AUTOMATED    Final    Sodium 07/20/2021 140  136 - 145 mmol/L Final    Potassium 07/20/2021 3.5  3.5 - 5.1 mmol/L Final    Chloride 07/20/2021 109* 97 - 108 mmol/L Final    CO2 07/20/2021 22  21 - 32 mmol/L Final    Anion gap 07/20/2021 9  5 - 15 mmol/L Final    Glucose 07/20/2021 66  65 - 100 mg/dL Final    BUN 07/20/2021 20  6 - 20 mg/dL Final    Creatinine 07/20/2021 0.49* 0.55 - 1.02 mg/dL Final    BUN/Creatinine ratio 07/20/2021 41* 12 - 20   Final    GFR est AA 07/20/2021 >60  >60 ml/min/1.73m2 Final    GFR est non-AA 07/20/2021 >60  >60 ml/min/1.73m2 Final    Calcium 07/20/2021 8.5  8.5 - 10.1 mg/dL Final    Bilirubin, total 07/20/2021 0.6  0.2 - 1.0 mg/dL Final    AST (SGOT) 07/20/2021 19  15 - 37 U/L Final    ALT (SGPT) 07/20/2021 25  12 - 78 U/L Final    Alk.  phosphatase 07/20/2021 67  45 - 117 U/L Final    Protein, total 07/20/2021 6.9  6.4 - 8.2 g/dL Final    Albumin 07/20/2021 3.2* 3.5 - 5.0 g/dL Final    Globulin 07/20/2021 3.7  2.0 - 4.0 g/dL Final    A-G Ratio 07/20/2021 0.9* 1.1 - 2.2   Final        Assessment:     Problem List Items Addressed This Visit        Digestive    Bowel obstruction ([address] - Primary    Relevant Orders    CT ABD PELV W CONT              Plan: I do not appreciate any signs of hernia. But I will order a CT scan abdomen pelvis with IV and oral contrast to further evaluate this. Patient will return after CT scan will discuss results together.         Terell Malhotra MD

## 2021-10-15 ENCOUNTER — HOSPITAL ENCOUNTER (OUTPATIENT)
Dept: CT IMAGING | Age: 76
Discharge: HOME | End: 2021-10-15
Attending: SURGERY
Payer: MEDICARE

## 2021-10-15 DIAGNOSIS — K56.609 INTESTINAL OBSTRUCTION, UNSPECIFIED CAUSE, UNSPECIFIED WHETHER PARTIAL OR COMPLETE (HCC): ICD-10-CM

## 2021-10-15 LAB
BUN SERPL-MCNC: 13 MG/DL (ref 6–20)
CREAT SERPL-MCNC: 0.61 MG/DL (ref 0.55–1.02)

## 2021-10-15 PROCEDURE — 74177 CT ABD & PELVIS W/CONTRAST: CPT

## 2021-10-15 PROCEDURE — 82565 ASSAY OF CREATININE: CPT

## 2021-10-15 PROCEDURE — 84520 ASSAY OF UREA NITROGEN: CPT

## 2021-10-15 PROCEDURE — 74011000636 HC RX REV CODE- 636: Performed by: SURGERY

## 2021-10-15 PROCEDURE — 36415 COLL VENOUS BLD VENIPUNCTURE: CPT

## 2021-10-15 RX ADMIN — IOPAMIDOL 100 ML: 755 INJECTION, SOLUTION INTRAVENOUS at 14:39

## 2021-10-27 ENCOUNTER — OFFICE VISIT (OUTPATIENT)
Dept: INTERNAL MEDICINE CLINIC | Age: 76
End: 2021-10-27
Payer: MEDICARE

## 2021-10-27 VITALS
HEIGHT: 65 IN | BODY MASS INDEX: 28.42 KG/M2 | WEIGHT: 170.6 LBS | TEMPERATURE: 98.4 F | SYSTOLIC BLOOD PRESSURE: 138 MMHG | OXYGEN SATURATION: 98 % | DIASTOLIC BLOOD PRESSURE: 86 MMHG | RESPIRATION RATE: 18 BRPM | HEART RATE: 76 BPM

## 2021-10-27 DIAGNOSIS — E78.00 PURE HYPERCHOLESTEROLEMIA: Primary | ICD-10-CM

## 2021-10-27 DIAGNOSIS — M15.8 OTHER OSTEOARTHRITIS INVOLVING MULTIPLE JOINTS: ICD-10-CM

## 2021-10-27 DIAGNOSIS — Z87.19 HISTORY OF INTESTINAL OBSTRUCTION: ICD-10-CM

## 2021-10-27 PROBLEM — K56.609 BOWEL OBSTRUCTION (HCC): Status: RESOLVED | Noted: 2021-07-19 | Resolved: 2021-10-27

## 2021-10-27 PROCEDURE — 3017F COLORECTAL CA SCREEN DOC REV: CPT | Performed by: INTERNAL MEDICINE

## 2021-10-27 PROCEDURE — G8536 NO DOC ELDER MAL SCRN: HCPCS | Performed by: INTERNAL MEDICINE

## 2021-10-27 PROCEDURE — G9899 SCRN MAM PERF RSLTS DOC: HCPCS | Performed by: INTERNAL MEDICINE

## 2021-10-27 PROCEDURE — 1090F PRES/ABSN URINE INCON ASSESS: CPT | Performed by: INTERNAL MEDICINE

## 2021-10-27 PROCEDURE — G8427 DOCREV CUR MEDS BY ELIG CLIN: HCPCS | Performed by: INTERNAL MEDICINE

## 2021-10-27 PROCEDURE — 99214 OFFICE O/P EST MOD 30 MIN: CPT | Performed by: INTERNAL MEDICINE

## 2021-10-27 PROCEDURE — G8400 PT W/DXA NO RESULTS DOC: HCPCS | Performed by: INTERNAL MEDICINE

## 2021-10-27 PROCEDURE — 1101F PT FALLS ASSESS-DOCD LE1/YR: CPT | Performed by: INTERNAL MEDICINE

## 2021-10-27 PROCEDURE — G8432 DEP SCR NOT DOC, RNG: HCPCS | Performed by: INTERNAL MEDICINE

## 2021-10-27 PROCEDURE — G8419 CALC BMI OUT NRM PARAM NOF/U: HCPCS | Performed by: INTERNAL MEDICINE

## 2021-10-27 NOTE — PROGRESS NOTES
Chief Complaint   Patient presents with    Follow-up     Visit Vitals  /86 (BP 1 Location: Left arm, BP Patient Position: Sitting, BP Cuff Size: Adult)   Pulse 76   Temp 98.4 °F (36.9 °C) (Oral)   Resp 18   Ht 5' 5\" (1.651 m)   Wt 170 lb 9.6 oz (77.4 kg)   SpO2 98%   BMI 28.39 kg/m²     1. Have you been to the ER, urgent care clinic since your last visit? Hospitalized since your last visit? No    2. Have you seen or consulted any other health care providers outside of the [address] [place] [address] since your last visit? Include any pap smears or colon screening.  No

## 2021-10-27 NOTE — PROGRESS NOTES
Violetta Lundborg is a 77 y.o. female and presents with Follow-up  Patient presents for follow-up since my absence she is had bowel obstruction intestinal obstruction and had surgery CT scan recently showed tiny liver cyst but normal pancreas spleen gallbladder adrenal glands no evidence of bowel obstruction on the study performed October 15 her  is present she is feeling quite well wearing corrective lenses no longer taking Bentyl but continues on vitamin D montelukast magnesium she says she is off Levsin as well we reviewed last labs April of this year yielding white count 6200 hemoglobin 12.4 glucose 94 creatinine 0.55 total cholesterol 190 HDL 54  TSH 0.6 free T4 1.66 blood pressure resting 144/82 left arm she has received the [employer] coronavirus vaccine I advised her to obtain [employer] booster vaccines and she did see Dr. Dwyer Comes her breast surgeon last week, she sees for radial scar breast excision and is doing well she also seen Dr. Karla Gomez in the past for history of gallstones and incisional hernia repair. Also she sees Dr. Mamadou Ríos yes and neurologist is noted to have diverticulosis abdomen is soft today we discussed some preventive care issues           Review of Systems  Constitutional: negative for fevers, chills, anorexia, weight loss and fatigue,no insomnia  Eyes:   negative for visual disturbance and irritation, eye discharge, eye pain. no eye redness. ENT:   negative for tinnitus, sore throat, nasal congestion, ear pain, hoarseness, hearing loss.,no snoring. Respiratory:  negative for cough, hemoptysis, shortness of breath, wheezing,  CV:   negative for chest pain, palpitations, lower extremity edema, shortness of breath while sitting, walking or at night  GI:   negative for nausea, vomiting, diarrhea, abdominal pain,melena,constipation. Endo:               negative for polyuria, polydipsia, polyphagia, cold or heat intolerance,hair loss.   Genitourinary: negative for frequency, dysuria and hematuria,urethral discharge,nocturia.straining while urination,urinary incontinence. Integument:  negative for rash and pruritus  Hematologic:  negative for easy bruising and gum/nose bleeding, enlarged nodes  Musculoskel: negative for myalgias, arthralgias, back pain, muscle weakness, joint pain, h/o fall,cramps,calf pain. DJD changes  Neurological:  negative for headaches, dizziness, vertigo, memory problems, gait and seizures loss of consciousness,no ataxia. Behavl/Psych: negative for feelings of anxiety, depression, mood changes ,sadness    Past Medical History:   Diagnosis Date    Arthritis      Past Surgical History:   Procedure Laterality Date    HX BREAST BIOPSY      HX BREAST BIOPSY Left 10/28/2020    Left Breast Wire Localized Excisional Biopsy performed by Reyna Mckay MD at [address] [place] [address] HX COLONOSCOPY      HX GYN      hysterectomy    HX HERNIA REPAIR      HX HERNIA REPAIR      HX HERNIA REPAIR  07/18/2021    HX ORTHOPAEDIC      knee     Social History     Socioeconomic History    Marital status:      Spouse name: Not on file    Number of children: Not on file    Years of education: Not on file    Highest education level: Not on file   Tobacco Use    Smoking status: Never Smoker    Smokeless tobacco: Never Used   Vaping Use    Vaping Use: Never used   Substance and Sexual Activity    Alcohol use: Yes     Alcohol/week: 1.0 standard drinks     Types: 1 Glasses of wine per week    Drug use: Never     Social Determinants of Health     Financial Resource Strain:     Difficulty of Paying Living Expenses:    Food Insecurity:     Worried About Running Out of Food in the Last Year:     [address] in the Last Year:    Transportation Needs:     Lack of Transportation (Medical):      Lack of Transportation (Non-Medical):    Physical Activity:     Days of Exercise per Week:     Minutes of Exercise per Session:    Stress:     Feeling of Stress :    Social Connections:     Frequency of Communication with Friends and Family:     Frequency of Social Gatherings with Friends and Family:     Attends Bahai Services:     Active Member of Clubs or Organizations:     Attends Club or Organization Meetings:     Marital Status:      Family History   Problem Relation Age of Onset    Cancer Mother     Cancer Father     Heart Disease Father      Current Outpatient Medications   Medication Sig Dispense Refill    montelukast (SINGULAIR) 10 mg tablet Take 1 tablet every day by oral route. 90 Tablet 0    homeopathic drugs (MISC NATURAL PRODUCT OP) Take  by mouth. High potency grapine      naproxen sodium (Aleve) 220 mg tablet Take 220 mg by mouth two (2) times daily (with meals).  calcium-cholecalciferol, d3, (CALCIUM 600 + D) 600-125 mg-unit tab Take 600 mg by mouth daily.  echinacea 400 mg cap Take 400 mg by mouth daily.  magnesium 250 mg tab Take 250 mg by mouth daily. No Known Allergies    Objective:  Visit Vitals  /86 (BP 1 Location: Left arm, BP Patient Position: Sitting, BP Cuff Size: Adult)   Pulse 76   Temp 98.4 °F (36.9 °C) (Oral)   Resp 18   Ht 5' 5\" (1.651 m)   Wt 170 lb 9.6 oz (77.4 kg)   SpO2 98%   BMI 28.39 kg/m²       Physical Exam:   Constitutional: General Appearance: healthy-appearing and obese. Level of Distress: NAD. Ambulation: ambulating normally. Psychiatric: Mental Status: normal mood and affect and active and alert. Orientation: to time, place, and person. no agitation. ,normal eye contact. normal insight  Head: Head: normocephalic and atraumatic. Eyes: Pupils: PERRLA. Sclerae: non-icteric. ENMT: No lesions on external ear, no hearing loss. No lesions on external nose, sinus tenderness, or nasal discharge. Lips, Teeth, and no mouth or lip ulcers   Neck: Neck: supple, trachea midline, and no masses. Lymph Nodes: no cervical LAD. Thyroid: no enlargement or nodules and non-tender.    Lungs: Respiratory effort: no dyspnea. Auscultation: no wheezing, rales/crackles, or rhonchi and breath sounds normal and good air movement. Cardiovascular: Apical Impulse: not displaced. Heart Auscultation: normal S1 and S2; no murmurs, rubs, or gallops; and RRR. Neck vessels: no carotid bruits. Pulses including femoral / pedal: normal throughout. Abdomen: Bowel Sounds: normal. Inspection and Palpation: no tenderness, guarding, or masses and soft and non-distended. Liver: non-tender and no hepatomegaly. Musculoskeletal[de-identified] Extremities: no edema or varicosities. Calf tenderness. DJD changes neurologic: Gait and Station: normal gait and station. Motor Strength normal right and left. Sensory and cerebellar intact. Skin: Inspection and palpation: no rash, lesions, or ulcer. Results for orders placed or performed during the hospital encounter of 10/15/21   BUN   Result Value Ref Range    BUN 13 6 - 20 mg/dL   CREATININE AND GFR   Result Value Ref Range    Creatinine 0.61 0.55 - 1.02 mg/dL    GFR est AA >60 >60 ml/min/1.73m2    GFR est non-AA >60 >60 ml/min/1.73m2       Assessment/Plan:    ICD-10-CM ICD-9-CM    1. Pure hypercholesterolemia  E78.00 272.0    2. History of intestinal obstruction  Z87.19 V12.79    3. Other osteoarthritis involving multiple joints  M15.8 715.89      No orders of the defined types were placed in this encounter. continue present plan, call if any problems    There are no Patient Instructions on file for this visit. Follow-up and Dispositions    · Return in about 6 months (around 4/27/2022).

## 2021-10-29 ENCOUNTER — OFFICE VISIT (OUTPATIENT)
Dept: SURGERY | Age: 76
End: 2021-10-29
Payer: MEDICARE

## 2021-10-29 VITALS
OXYGEN SATURATION: 98 % | WEIGHT: 170.8 LBS | TEMPERATURE: 97.7 F | HEIGHT: 65 IN | HEART RATE: 80 BPM | BODY MASS INDEX: 28.46 KG/M2 | DIASTOLIC BLOOD PRESSURE: 74 MMHG | SYSTOLIC BLOOD PRESSURE: 152 MMHG

## 2021-10-29 DIAGNOSIS — K57.90 DIVERTICULOSIS: Primary | ICD-10-CM

## 2021-10-29 PROCEDURE — 3017F COLORECTAL CA SCREEN DOC REV: CPT | Performed by: SURGERY

## 2021-10-29 PROCEDURE — 1101F PT FALLS ASSESS-DOCD LE1/YR: CPT | Performed by: SURGERY

## 2021-10-29 PROCEDURE — 1090F PRES/ABSN URINE INCON ASSESS: CPT | Performed by: SURGERY

## 2021-10-29 PROCEDURE — G9899 SCRN MAM PERF RSLTS DOC: HCPCS | Performed by: SURGERY

## 2021-10-29 PROCEDURE — G8536 NO DOC ELDER MAL SCRN: HCPCS | Performed by: SURGERY

## 2021-10-29 PROCEDURE — G8400 PT W/DXA NO RESULTS DOC: HCPCS | Performed by: SURGERY

## 2021-10-29 PROCEDURE — 99213 OFFICE O/P EST LOW 20 MIN: CPT | Performed by: SURGERY

## 2021-10-29 PROCEDURE — G8419 CALC BMI OUT NRM PARAM NOF/U: HCPCS | Performed by: SURGERY

## 2021-10-29 PROCEDURE — G8432 DEP SCR NOT DOC, RNG: HCPCS | Performed by: SURGERY

## 2021-10-29 PROCEDURE — G8427 DOCREV CUR MEDS BY ELIG CLIN: HCPCS | Performed by: SURGERY

## 2021-10-29 NOTE — PROGRESS NOTES
General FOLLOW UP      Subjective:   CHIEF COMPLAINTS:  Patient still have occasional lump on the left lower quadrant. PRESENTATION OF ILLNESS:  Merna Stinson is a 77 y.o. very pleasant woman persistent left lower quadrant swelling which is relieved by pressing on it and and after bowel movement. Patient has CT scan done which shows no hernia however diverticulosis. Patient is not particular constipated. Past Medical History:   Diagnosis Date    Arthritis       Past Surgical History:   Procedure Laterality Date    HX BREAST BIOPSY      HX BREAST BIOPSY Left 10/28/2020    Left Breast Wire Localized Excisional Biopsy performed by Cathy Jesus MD at [address] [place] [address] [facility]      HX GYN      hysterectomy    HX HERNIA REPAIR      HX HERNIA REPAIR      HX HERNIA REPAIR  07/18/2021    HX ORTHOPAEDIC      knee     Family History   Problem Relation Age of Onset    Cancer Mother     Cancer Father     Heart Disease Father       Social History     Tobacco Use    Smoking status: Never Smoker    Smokeless tobacco: Never Used   Substance Use Topics    Alcohol use: Yes     Alcohol/week: 1.0 standard drinks     Types: 1 Glasses of wine per week       Prior to Admission medications    Medication Sig Start Date End Date Taking? Authorizing Provider   montelukast (SINGULAIR) 10 mg tablet Take 1 tablet every day by oral route. 8/27/21  Yes Hyacinth Elaine MD   homeopathic drugs (MISC NATURAL PRODUCT OP) Take  by mouth. High potency grapine   Yes Provider, Historical   naproxen sodium (Aleve) 220 mg tablet Take 220 mg by mouth two (2) times daily (with meals). Yes Provider, Historical   calcium-cholecalciferol, d3, (CALCIUM 600 + D) 600-125 mg-unit tab Take 600 mg by mouth daily. Yes Provider, Historical   echinacea 400 mg cap Take 400 mg by mouth daily. Yes Provider, Historical   magnesium 250 mg tab Take 250 mg by mouth daily.    Yes Provider, Historical     No Known Allergies     Review of Systems:  I reviewed the rest of organ systems personally and they were negative signed by Dr. Sonia Martinez    Objective:     Visit Vitals  BP (!) 152/74 (BP 1 Location: Left arm, BP Patient Position: Sitting, BP Cuff Size: Adult)   Pulse 80   Temp 97.7 °F (36.5 °C) (Temporal)   Ht 5' 5\" (1.651 m)   Wt 170 lb 12.8 oz (77.5 kg)   SpO2 98%   BMI 28.42 kg/m²     VITAL SIGNS REVIEWED. Physical Exam:  Patient is well-nourished pleasant in conversation is appropriate. Head and neck examination atraumatic, normocephalic. Gaze appropriate. Conversation appropriate. Neck examination shows supple. No mass. No obvious carotid bruit. Chest examination shows lungs are clear bilaterally well-expanded, no crackles or wheezes. Cardiovascular system regular rate, no obvious murmur. Skin warm to touch  and moist, no skin lesions. Abdomen is soft ,not tender or distended bowel sounds present. No palpable mass. Neurological examinations, no focal neuro deficits moving all 4 extremities. Cranial nerves intact. Sensation is intact as well. Hematologic: No obvious bruise or swelling or obvious lymphadenopathy. Psychosocial: Appropriate. Has good effect. Musculoskeletal system: No muscle wasting, appropriate movements upper and lower extremity. Abdominal examination essentially benign. Data Review:   Hospital Outpatient Visit on 10/15/2021   Component Date Value Ref Range Status    BUN 10/15/2021 13  6 - 20 mg/dL Final    Creatinine 10/15/2021 0.61  0.55 - 1.02 mg/dL Final    GFR est AA 10/15/2021 >60  >60 ml/min/1.73m2 Final    GFR est non-AA 10/15/2021 >60  >60 ml/min/1.73m2 Final        Assessment:     Problem List Items Addressed This Visit        Digestive    Diverticulosis - Primary              Plan:     I explained to the patient possible explanation for her left lower quadrant discomfort and lump could be related to diverticulosis. We also talked about managing diverticulosis as well.   I did recommend a stool softener or fibers. If she gets constipated. But she does not seems to be constipated. Her bowel meds daily basis and regular. If this is small subclinical hernia only way to find out recent diagnostic laparoscopy. But her symptoms not too bad. We will continue monitor her symptoms repeat examination in 6 months follow-up.         Eve Dobson MD

## 2022-02-21 ENCOUNTER — OFFICE VISIT (OUTPATIENT)
Dept: PODIATRY | Age: 77
End: 2022-02-21
Payer: MEDICARE

## 2022-02-21 VITALS
WEIGHT: 170 LBS | TEMPERATURE: 97.3 F | SYSTOLIC BLOOD PRESSURE: 151 MMHG | DIASTOLIC BLOOD PRESSURE: 77 MMHG | BODY MASS INDEX: 28.32 KG/M2 | HEIGHT: 65 IN | HEART RATE: 82 BPM

## 2022-02-21 DIAGNOSIS — B35.1 ONYCHOMYCOSIS: Primary | ICD-10-CM

## 2022-02-21 PROCEDURE — G8400 PT W/DXA NO RESULTS DOC: HCPCS | Performed by: PODIATRIST

## 2022-02-21 PROCEDURE — 3017F COLORECTAL CA SCREEN DOC REV: CPT | Performed by: PODIATRIST

## 2022-02-21 PROCEDURE — G8432 DEP SCR NOT DOC, RNG: HCPCS | Performed by: PODIATRIST

## 2022-02-21 PROCEDURE — G8419 CALC BMI OUT NRM PARAM NOF/U: HCPCS | Performed by: PODIATRIST

## 2022-02-21 PROCEDURE — 1101F PT FALLS ASSESS-DOCD LE1/YR: CPT | Performed by: PODIATRIST

## 2022-02-21 PROCEDURE — G8536 NO DOC ELDER MAL SCRN: HCPCS | Performed by: PODIATRIST

## 2022-02-21 PROCEDURE — G8427 DOCREV CUR MEDS BY ELIG CLIN: HCPCS | Performed by: PODIATRIST

## 2022-02-21 PROCEDURE — 1090F PRES/ABSN URINE INCON ASSESS: CPT | Performed by: PODIATRIST

## 2022-02-21 PROCEDURE — 99213 OFFICE O/P EST LOW 20 MIN: CPT | Performed by: PODIATRIST

## 2022-02-21 NOTE — PROGRESS NOTES
Chief Complaint   Patient presents with    Foot Exam     pt states she would liike her toenails clipped     1. Have you been to the ER, urgent care clinic since your last visit? Hospitalized since your last visit? No    2. Have you seen or consulted any other health care providers outside of the [address] [place] [address] since your last visit? Include any pap smears or colon screening.  No  PCP-Dr Knott

## 2022-03-11 NOTE — PROGRESS NOTES
[facility]    Subjective:         Patient is a 77 y.o. female who is being seen as a returning pt for continued management regarding her onychomycosis. Patient states the toenail plate biopsy was taken roughly 1 year prior. She states she she has been unable to review the results. She would like to do so today. She denies any associated pain. She denies any trauma to her toenails. She denies any systemic signs of infection. She states that she has tried multiple over-the-counter medications without relief of her symptoms. She denies any other lower extremity complaints    Past Medical History:   Diagnosis Date    Arthritis      Past Surgical History:   Procedure Laterality Date    HX BREAST BIOPSY      HX BREAST BIOPSY Left 10/28/2020    Left Breast Wire Localized Excisional Biopsy performed by Merced Pedersen MD at [address] HX COLONOSCOPY      HX GYN      hysterectomy    HX HERNIA REPAIR      HX HERNIA REPAIR      HX HERNIA REPAIR  07/18/2021    HX ORTHOPAEDIC      knee       Family History   Problem Relation Age of Onset    Cancer Mother     Cancer Father     Heart Disease Father       Social History     Tobacco Use    Smoking status: Never Smoker    Smokeless tobacco: Never Used   Substance Use Topics    Alcohol use: Yes     Alcohol/week: 1.0 standard drink     Types: 1 Glasses of wine per week     No Known Allergies  Prior to Admission medications    Medication Sig Start Date End Date Taking? Authorizing Provider   montelukast (SINGULAIR) 10 mg tablet Take 1 tablet every day by oral route. 8/27/21   Sumaya Arroyo MD   homeopathic drugs (MISC NATURAL PRODUCT OP) Take  by mouth. High potency grapine    Provider, Historical   naproxen sodium (Aleve) 220 mg tablet Take 220 mg by mouth two (2) times daily (with meals). Provider, Historical   calcium-cholecalciferol, d3, (CALCIUM 600 + D) 600-125 mg-unit tab Take 600 mg by mouth daily.     Provider, Historical   echinacea 400 mg cap Take 400 mg by mouth daily. Provider, Historical   magnesium 250 mg tab Take 250 mg by mouth daily. Provider, Historical       Review of Systems   Constitutional: Negative. HENT: Negative. Eyes: Negative. Respiratory: Negative. Cardiovascular: Negative. Gastrointestinal: Negative. Endocrine: Negative. Genitourinary: Negative. Musculoskeletal: Negative. Skin: Negative. Allergic/Immunologic: Negative. Neurological: Negative. Hematological: Negative. Psychiatric/Behavioral: Negative. All other systems reviewed and are negative. Objective:     Visit Vitals  BP (!) 151/77 (BP 1 Location: Right upper arm, BP Patient Position: Sitting, BP Cuff Size: Large adult)   Pulse 82   Temp 97.3 °F (36.3 °C) (Temporal)   Ht 5' 5\" (1.651 m)   Wt 170 lb (77.1 kg)   BMI 28.29 kg/m²       Physical Exam  Vitals reviewed. Constitutional:       Appearance: She is overweight. Cardiovascular:      Pulses:           Dorsalis pedis pulses are 2+ on the right side and 2+ on the left side. Posterior tibial pulses are 2+ on the right side and 2+ on the left side. Pulmonary:      Effort: Pulmonary effort is normal.   Musculoskeletal:      Right lower leg: No edema. Left lower leg: No edema. Right foot: Normal range of motion. No deformity or bunion. Left foot: Normal range of motion. No deformity or bunion. Feet:      Right foot:      Protective Sensation: 10 sites tested. 10 sites sensed. Skin integrity: Skin integrity normal.      Toenail Condition: Right toenails are abnormally thick. Fungal disease present. Left foot:      Protective Sensation: 10 sites tested. 10 sites sensed. Skin integrity: Skin integrity normal.      Toenail Condition: Left toenails are abnormally thick. Fungal disease present. Lymphadenopathy:      Lower Body: No right inguinal adenopathy. No left inguinal adenopathy.    Skin:     General: Skin is warm. Capillary Refill: Capillary refill takes 2 to 3 seconds. Neurological:      Mental Status: She is alert and oriented to person, place, and time. Psychiatric:         Mood and Affect: Mood and affect normal.         Behavior: Behavior is cooperative. Impression:       ICD-10-CM ICD-9-CM    1. Onychomycosis  B35.1 110.1          Recommendation:     Patient seen and evaluated in the office  Discussed and educated patient regarding her current medical condition  Reviewed toenail plate biopsy results and discussed findings with patient. Treatment options discussed, oral versus topical medication. Possible complications/side effects of each reviewed. Patient elected to monitor at this time. Instructed patient to thin her toenails for symptomatic relief        Varun Sequeira, [facility], [address] [place] [address] and [facility]  [address] [place] [address]  O: [phone]  F: [phone]  C: [phone]

## 2022-03-19 PROBLEM — B35.1 ONYCHOMYCOSIS: Status: ACTIVE | Noted: 2021-01-27

## 2022-03-19 PROBLEM — K57.90 DIVERTICULOSIS: Status: ACTIVE | Noted: 2021-10-29

## 2022-03-19 PROBLEM — R92.8 ABNORMAL BREAST THERMOGRAPHY: Status: ACTIVE | Noted: 2020-10-28

## 2022-03-20 PROBLEM — K43.6 SPIGELIAN HERNIA WITH BOWEL OBSTRUCTION: Status: ACTIVE | Noted: 2021-07-18

## 2022-03-29 ENCOUNTER — TELEPHONE (OUTPATIENT)
Dept: INTERNAL MEDICINE CLINIC | Age: 77
End: 2022-03-29

## 2022-03-29 NOTE — TELEPHONE ENCOUNTER
----- Message from [place] sent at 3/29/2022 12:41 PM EDT -----  Subject: Appointment Request    Reason for Call: Routine Existing Condition Follow Up    QUESTIONS  Type of Appointment? Established Patient  Reason for appointment request? Available appointments did not meet   patient need  Additional Information for Provider? Patient states office left message to   reschedule 5/19 appointment with her PCP. She is requesting to see a   provider in the  office sometime within the the next month. No   availability through sales force. ---------------------------------------------------------------------------  --------------  Anatoliy GUTIERREZ  What is the best way for the office to contact you? OK to leave message on   voicemail  Preferred Call Back Phone Number? [phone]  ---------------------------------------------------------------------------  --------------  SCRIPT ANSWERS  Relationship to Patient? Self  Is this follow up request related to routine Diabetes Management? No  Have you been diagnosed with, awaiting test results for, or told that you   are suspected of having COVID-19 (Coronavirus)? (If patient has tested   negative or was tested as a requirement for work, school, or travel and   not based on symptoms, answer no)? No  Within the past 10 days have you developed any of the following symptoms   (answer no if symptoms have been present longer than 10 days or began   more than 10 days ago)? Fever or Chills, Cough, Shortness of breath or   difficulty breathing, Loss of taste or smell, Sore throat, Nasal   congestion, Sneezing or runny nose, Fatigue or generalized body aches   (answer no if pain is specific to a body part e.g. back pain), Diarrhea,   Headache? No  Have you had close contact with someone with COVID-19 in the last 7 days? No  (Service Expert  click yes below to proceed with [facility] [employer]   Scheduling)?  Yes

## 2022-05-02 ENCOUNTER — OFFICE VISIT (OUTPATIENT)
Dept: SURGERY | Age: 77
End: 2022-05-02
Payer: MEDICARE

## 2022-05-02 VITALS
DIASTOLIC BLOOD PRESSURE: 87 MMHG | BODY MASS INDEX: 28.91 KG/M2 | HEIGHT: 65 IN | SYSTOLIC BLOOD PRESSURE: 165 MMHG | OXYGEN SATURATION: 98 % | HEART RATE: 79 BPM | WEIGHT: 173.5 LBS | RESPIRATION RATE: 12 BRPM | TEMPERATURE: 98.4 F

## 2022-05-02 DIAGNOSIS — K57.90 DIVERTICULOSIS: Primary | ICD-10-CM

## 2022-05-02 PROCEDURE — G8419 CALC BMI OUT NRM PARAM NOF/U: HCPCS | Performed by: SURGERY

## 2022-05-02 PROCEDURE — G8400 PT W/DXA NO RESULTS DOC: HCPCS | Performed by: SURGERY

## 2022-05-02 PROCEDURE — G8510 SCR DEP NEG, NO PLAN REQD: HCPCS | Performed by: SURGERY

## 2022-05-02 PROCEDURE — 1090F PRES/ABSN URINE INCON ASSESS: CPT | Performed by: SURGERY

## 2022-05-02 PROCEDURE — G8536 NO DOC ELDER MAL SCRN: HCPCS | Performed by: SURGERY

## 2022-05-02 PROCEDURE — G8427 DOCREV CUR MEDS BY ELIG CLIN: HCPCS | Performed by: SURGERY

## 2022-05-02 PROCEDURE — 99213 OFFICE O/P EST LOW 20 MIN: CPT | Performed by: SURGERY

## 2022-05-02 PROCEDURE — 3017F COLORECTAL CA SCREEN DOC REV: CPT | Performed by: SURGERY

## 2022-05-02 PROCEDURE — 1101F PT FALLS ASSESS-DOCD LE1/YR: CPT | Performed by: SURGERY

## 2022-05-02 RX ORDER — FLUTICASONE PROPIONATE 50 MCG
2 SPRAY, SUSPENSION (ML) NASAL AS NEEDED
COMMUNITY
Start: 2022-04-29

## 2022-05-02 NOTE — PROGRESS NOTES
Chief Complaint   Patient presents with    Follow-up     6 months diverticulos      Visit Vitals  BP (!) 165/87 (BP 1 Location: Left upper arm, BP Patient Position: Sitting, BP Cuff Size: Adult)   Pulse 79   Temp 98.4 °F (36.9 °C) (Temporal)   Resp 12   Ht 5' 5\" (1.651 m)   Wt 173 lb 8 oz (78.7 kg)   SpO2 98%   BMI 28.87 kg/m²     1. Have you been to the ER, urgent care clinic since your last visit? Hospitalized since your last visit? No    2. Have you seen or consulted any other health care providers outside of the [address] [place] [address] since your last visit? Include any pap smears or colon screening.  No

## 2022-05-04 ENCOUNTER — OFFICE VISIT (OUTPATIENT)
Dept: INTERNAL MEDICINE CLINIC | Age: 77
End: 2022-05-04
Payer: MEDICARE

## 2022-05-04 VITALS
DIASTOLIC BLOOD PRESSURE: 77 MMHG | BODY MASS INDEX: 28.49 KG/M2 | HEART RATE: 82 BPM | RESPIRATION RATE: 12 BRPM | WEIGHT: 171 LBS | SYSTOLIC BLOOD PRESSURE: 120 MMHG | HEIGHT: 65 IN | OXYGEN SATURATION: 99 %

## 2022-05-04 DIAGNOSIS — Z00.00 MEDICARE ANNUAL WELLNESS VISIT, SUBSEQUENT: Primary | ICD-10-CM

## 2022-05-04 DIAGNOSIS — E78.5 HYPERLIPIDEMIA, UNSPECIFIED HYPERLIPIDEMIA TYPE: ICD-10-CM

## 2022-05-04 DIAGNOSIS — Z78.0 POSTMENOPAUSAL: ICD-10-CM

## 2022-05-04 DIAGNOSIS — Z11.59 ENCOUNTER FOR HEPATITIS C SCREENING TEST FOR LOW RISK PATIENT: ICD-10-CM

## 2022-05-04 DIAGNOSIS — Z71.89 ACP (ADVANCE CARE PLANNING): ICD-10-CM

## 2022-05-04 DIAGNOSIS — Z13.31 NEGATIVE DEPRESSION SCREENING: ICD-10-CM

## 2022-05-04 PROCEDURE — G0439 PPPS, SUBSEQ VISIT: HCPCS | Performed by: FAMILY MEDICINE

## 2022-05-04 NOTE — PROGRESS NOTES
Chief Complaint   Patient presents with    Follow-up     Visit Vitals  /77 (BP 1 Location: Left upper arm, BP Patient Position: Sitting, BP Cuff Size: Adult)   Pulse 82   Resp 12   Ht 5' 5\" (1.651 m)   Wt 171 lb (77.6 kg)   SpO2 99%   BMI 28.46 kg/m²       1. \"Have you been to the ER, urgent care clinic since your last visit? Hospitalized since your last visit? \" No    2. \"Have you seen or consulted any other health care providers outside of the [address] [place] [address] since your last visit? \" No     3. For patients aged 39-70: Has the patient had a colonoscopy / FIT/ Cologuard? Yes - Care Gap present. Rooming MA/LPN to request most recent results 2019 's office       If the patient is female:    4. For patients aged 41-77: Has the patient had a mammogram within the past 2 years? Yes - Care Gap present. Most recent result on file      5. For patients aged 21-65: Has the patient had a pap smear?  NA - based on age or sex

## 2022-05-04 NOTE — PROGRESS NOTES
VASCULAR FOLLOW UP      Subjective:   CHIEF COMPLAINTS:  Abdominal pain. PRESENTATION OF ILLNESS:  Su Silvestre is a 77 y.o. very pleasant woman follow-up today 1 year about abdominal issues. Patient currently doing very well without any GI symptoms. Patient has occasional sharp pain in the left lower quadrant area. Previous right lower quadrant pain is resolved after surgery. Past Medical History:   Diagnosis Date    Arthritis       Past Surgical History:   Procedure Laterality Date    HX BREAST BIOPSY      HX BREAST BIOPSY Left 10/28/2020    Left Breast Wire Localized Excisional Biopsy performed by Nicolás Chacon MD at [facility] HX COLONOSCOPY      HX GYN      hysterectomy    HX HERNIA REPAIR      HX HERNIA REPAIR      HX HERNIA REPAIR  07/18/2021    HX ORTHOPAEDIC      knee     Family History   Problem Relation Age of Onset    Cancer Mother     Cancer Father     Heart Disease Father       Social History     Tobacco Use    Smoking status: Never Smoker    Smokeless tobacco: Never Used   Substance Use Topics    Alcohol use: Yes     Alcohol/week: 1.0 standard drink     Types: 1 Glasses of wine per week       Prior to Admission medications    Medication Sig Start Date End Date Taking? Authorizing Provider   fluticasone propionate (FLONASE) 50 mcg/actuation nasal spray 2 Sprays by Both Nostrils route as needed. 4/29/22  Yes Provider, Historical   montelukast sodium (SINGULAIR PO) Take 1 Tablet by mouth as needed. Yes Provider, Historical   homeopathic drugs (MISC NATURAL PRODUCT OP) Take  by mouth. High potency grapine   Yes Provider, Historical   naproxen sodium (Aleve) 220 mg tablet Take 220 mg by mouth two (2) times daily (with meals). Yes Provider, Historical   calcium-cholecalciferol, d3, (CALCIUM 600 + D) 600-125 mg-unit tab Take 600 mg by mouth daily. Yes Provider, Historical   echinacea 400 mg cap Take 400 mg by mouth daily.    Yes Provider, Historical   magnesium 250 mg tab Take 250 mg by mouth daily. Yes Provider, Historical     No Known Allergies     Review of Systems:  I reviewed the rest of organ systems personally and they were negative signed by Dr. Azael Baum    Objective:     Visit Vitals  BP (!) 165/87 (BP 1 Location: Left upper arm, BP Patient Position: Sitting, BP Cuff Size: Adult)   Pulse 79   Temp 98.4 °F (36.9 °C) (Temporal)   Resp 12   Ht 5' 5\" (1.651 m)   Wt 173 lb 8 oz (78.7 kg)   SpO2 98%   BMI 28.87 kg/m²     VITAL SIGNS REVIEWED. Physical Exam:  Patient is well-nourished pleasant in conversation is appropriate. Head and neck examination atraumatic, normocephalic. Gaze appropriate. Conversation appropriate. Neck examination shows supple. No mass. No obvious carotid bruit. Chest examination shows lungs are clear bilaterally well-expanded, no crackles or wheezes. Cardiovascular system regular rate, no obvious murmur. Skin warm to touch  and moist, no skin lesions. Abdomen is soft ,not tender or distended bowel sounds present. No palpable mass. Neurological examinations, no focal neuro deficits moving all 4 extremities. Cranial nerves intact. Sensation is intact as well. Hematologic: No obvious bruise or swelling or obvious lymphadenopathy. Psychosocial: Appropriate. Has good effect. Musculoskeletal system: No muscle wasting, appropriate movements upper and lower extremity. Data Review:   No visits with results within 1 Month(s) from this visit.    Latest known visit with results is:   Hospital Outpatient Visit on 10/15/2021   Component Date Value Ref Range Status    BUN 10/15/2021 13  6 - 20 mg/dL Final    Creatinine 10/15/2021 0.61  0.55 - 1.02 mg/dL Final    GFR est AA 10/15/2021 >60  >60 ml/min/1.73m2 Final    GFR est non-AA 10/15/2021 >60  >60 ml/min/1.73m2 Final        Assessment:     Problem List Items Addressed This Visit        Digestive    Diverticulosis - Primary              Plan:     Abdominal examination shows a benign examination. Patient seems to doing very well. I will discharge her from surgical service now and follow with as needed basis. If she has recurrent symptoms or any GI issues she will contact me otherwise.         Matilda Clement MD

## 2022-05-04 NOTE — PROGRESS NOTES
[facility]  [facility], [address] [place] [address]  [phone]    Date of visit: 5/4/2022       This is a Subsequent Medicare Annual Wellness Visit (AWV), (Performed more than 12 months after effective date of Medicare Part B enrollment and 12 months after last preventive visit.)    I have reviewed the patient's medical history in detail and updated the computerized patient record. History obtained from: the patient. female  77 y.o. BLACK/  Depression Risk Factor Screening:     3 most recent PHQ Screens 5/4/2022   Little interest or pleasure in doing things Not at all   Feeling down, depressed, irritable, or hopeless Not at all   Total Score PHQ 2 0   Trouble falling or staying asleep, or sleeping too much Not at all   Feeling tired or having little energy Not at all   Poor appetite, weight loss, or overeating Not at all   Feeling bad about yourself - or that you are a failure or have let yourself or your family down Not at all   Trouble concentrating on things such as school, work, reading, or watching TV Not at all   Moving or speaking so slowly that other people could have noticed; or the opposite being so fidgety that others notice Not at all   Thoughts of being better off dead, or hurting yourself in some way Not at all   PHQ 9 Score 0   How difficult have these problems made it for you to do your work, take care of your home and get along with others Not difficult at all     [address] [place] [address] 10/15/2020 10/28/2020 1/7/2021 7/18/2021   1) Within the past month, have you wished you were dead or wished you could go to sleep and not wake up? No No No No   2) Have you actually had any thoughts of killing yourself? No No No No   6) Have you ever done anything, started to do anything, or prepared to do anything to end your life? No No No No       Fall Risk Factor Screening:     Fall Risk Assessment, last 12 mths 5/4/2022   Able to walk? Yes   Fall in past 12 months? 0   Do you feel unsteady? 0   Are you worried about falling 0       Alcohol Risk Screen    Do you average more than 1 drink per night or more than 7 drinks a week:  No    On any one occasion in the past three months have you have had more than 3 drinks containing alcohol:  No         Functional Ability and Level of Safety:    Hearing: Hearing is good. Activities of Daily Living: The home contains: no safety equipment. Patient does total self care      Ambulation: with no difficulty      Abuse Screen:  Patient is not abused         Histories     Reviewed PmHx, FmHx, SocHx as well as meds and allergies, updated and dated in the chart. 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 HX COLONOSCOPY      HX GYN      hysterectomy    HX HERNIA REPAIR      HX HERNIA REPAIR      HX HERNIA REPAIR  07/18/2021    HX ORTHOPAEDIC      knee     No Known Allergies  Current Outpatient Medications   Medication Sig Dispense Refill    fluticasone propionate (FLONASE) 50 mcg/actuation nasal spray 2 Sprays by Both Nostrils route as needed.  montelukast sodium (SINGULAIR PO) Take 1 Tablet by mouth as needed.  homeopathic drugs (MISC NATURAL PRODUCT OP) Take  by mouth. High potency grapine      naproxen sodium (Aleve) 220 mg tablet Take 220 mg by mouth two (2) times daily (with meals).  calcium-cholecalciferol, d3, (CALCIUM 600 + D) 600-125 mg-unit tab Take 600 mg by mouth daily.  echinacea 400 mg cap Take 400 mg by mouth daily.       magnesium 250 mg tab Take 250 mg by mouth daily. Family History   Problem Relation Age of Onset    Cancer Mother     Cancer Father     Heart Disease Father      Social History     Tobacco Use    Smoking status: Never Smoker    Smokeless tobacco: Never Used   Substance Use Topics    Alcohol use: Yes     Alcohol/week: 1.0 standard drink     Types: 1 Glasses of wine per week       Current Complaints and Pertinent Exam   If patient is due for chronic medical conditions and/or has acute concerns in addition to the medicare wellness visit, they have been informed there may be a copay for the additional services provided, and agree to do both. ROS & Physical Exam: please see acute note if applicable      Diet and Exercise: walks daily, well-balanced diet    BP Readings from Last 3 Encounters:   05/04/22 120/77   05/02/22 (!) 165/87   02/21/22 (!) 151/77      Wt Readings from Last 3 Encounters:   05/04/22 171 lb (77.6 kg)   05/02/22 173 lb 8 oz (78.7 kg)   02/21/22 170 lb (77.1 kg)     Body mass index is 28.46 kg/m².    No exam data present    Was the patient's timed Up & Go test unsteady or longer than 30 seconds? no    Evaluation of Cognitive Function   Mood/affect:  happy  Orientation: Person, Place, Time and Situation  Appearance: age appropriate  Family member/caregiver input: N/A    Specialists/Care Team   Dereck Mauro has established care with the following healthcare providers:  Patient Care Team:  Meryl Correa MD as PCP - General (Internal Medicine)  Meryl Correa [facility] [address] Topics with due status: Overdue       Topic Date Due    Hepatitis C Screening Never done    DTaP/Tdap/Td series Never done    Colorectal Cancer Screening Combo Never done    Shingrix Vaccine Age 50> Never done    Bone Densitometry (Dexa) Screening Never done    Pneumococcal 65+ years 05/02/2017     Health Maintenance Topics with due status: Not Due       Topic Last Completion Date    Lipid Screen 04/27/2021    Depression Screen 05/02/2022    Medicare Yearly Exam 05/04/2022     Health Maintenance Topics with due status: Completed       Topic Last Completion Date    Flu Vaccine 10/05/2021    COVID-19 Vaccine 04/04/2022         Colon cancer:  Recommendation: Colonoscopy every 10y or annual FIT test from 50-75 or every 3 year stool DNA based test with consideration of ongoing screening from 76-85. and Up to date or Completed    Lung cancer (LDCT): Not Indicated    Hepatitis C:  Due, ordered    Diabetes:   Due, ordered    Lipids:   Due, ordered        PREVENTIVE CARE - FEMALE SCREENINGS     Cervical cancer: Not Indicated    Breast cancer: Recommendation:  USPSTF recommends screening mammography every 2 yr from 54-69. The decision to start screening mammography in women prior to age 48 years should be an individual one. Women who place a higher value on the potential benefit than the potential harms may choose to begin biennial screening between the ages of 36 and 52 years. Medicare covers annual screening mammography, USPSTF also recommends women with a personal or family history of breast, ovarian, tubal, or peritoneal cancer or who have an ancestry (Ashkenazi Jainism) associated with breast cancer susceptibility 1 and 2 (BRCA1/2) gene mutations with an appropriate brief familial risk assessment tool.  Women with a positive result on the risk assessment tool should receive genetic counseling and, if indicated after counseling, genetic testing and mammogram is already scheduled    Osteoporosis: Recommendation: Screen all women at 72, earlier if elevated risk and Due, ordered    AAA:   Not Indicated      IMMUNIZATIONS     Immunization History   Administered Date(s) Administered    COVID-19, J&J, PF, 0.5 mL Dose 03/25/2021    COVID-19, Pfizer Purple top, DILUTE for use, 12+ yrs, 30mcg/0.3mL dose 10/28/2021, 04/04/2022    Influenza Vaccine 10/04/2017, 10/10/2018, 10/04/2019, 09/21/2020, 10/05/2021    Pneumococcal Polysaccharide (PPSV-23) 05/02/2016    Zoster Vaccine, Live 02/18/2022, 04/21/2022       Pneumovax:   Recommendation: PPSV23 once for all >65 and high risk <65  and Up to date or Completed    Prevnar:   Declined    Influenza:   Recommendation: Vaccination annually, high dose if 65 or older    Shingrix:  Declined    TDaP:    Declined    Discussion of Advance Directive   Discussed with Lawson Quiroztramaine her ability to prepare and advance directive in the case that an injury or illness causes her to be unable to make health care decisions. Date of ACP Conversation: 05/04/22  Persons included in Conversation:  patient  Length of ACP Conversation in minutes:  <16 minutes (Non-Billable)    Authorized Decision Maker (if patient is incapable of making informed decisions): This person is:         Primary Decision Maker: Emiliano pollen - [phone]        For Patients with Decision Making Capacity:   Values/Goals: Exploration of values, goals, and preferences if recovery is not expected, even with continued medical treatment in the event of:  Imminent death  Severe, permanent brain injury  \"In these circumstances, what matters most to you? \"  Care focused more on comfort or quality of life. Conversation Outcomes / Follow-Up Plan:   ACP incomplete - refer to ACP Clinical Specialist    Assessment/Plan     Diagnoses and all orders for this visit:    1. Medicare annual wellness visit, subsequent  -     CBC WITH AUTOMATED DIFF  -     METABOLIC PANEL, COMPREHENSIVE  -     REFERRAL TO ACP CLINICAL SPECIALIST    2. ACP (advance care planning)  -     REFERRAL TO ACP CLINICAL SPECIALIST    3. Hyperlipidemia, unspecified hyperlipidemia type  -     LIPID PANEL    4. Postmenopausal  -     DEXA BONE DENSITY STUDY AXIAL; Future    5. Encounter for hepatitis C screening test for low risk patient  -     HEPATITIS C AB    6.  Negative depression screening  -     IN DEPRESSION SCREEN ANNUAL          Follow-up and Dispositions    · Return in about 1 month (around 6/4/2022) for medicare wellness.

## 2022-05-05 ENCOUNTER — PATIENT OUTREACH (OUTPATIENT)
Dept: CASE MANAGEMENT | Age: 77
End: 2022-05-05

## 2022-05-05 LAB
ALBUMIN SERPL-MCNC: 4.8 G/DL (ref 3.7–4.7)
ALBUMIN/GLOB SERPL: 1.7 {RATIO} (ref 1.2–2.2)
ALP SERPL-CCNC: 89 IU/L (ref 44–121)
ALT SERPL-CCNC: 17 IU/L (ref 0–32)
AST SERPL-CCNC: 19 IU/L (ref 0–40)
BASOPHILS # BLD AUTO: 0 X10E3/UL (ref 0–0.2)
BASOPHILS NFR BLD AUTO: 1 %
BILIRUB SERPL-MCNC: 0.5 MG/DL (ref 0–1.2)
BUN SERPL-MCNC: 12 MG/DL (ref 8–27)
BUN/CREAT SERPL: 20 (ref 12–28)
CALCIUM SERPL-MCNC: 10.1 MG/DL (ref 8.7–10.3)
CHLORIDE SERPL-SCNC: 103 MMOL/L (ref 96–106)
CHOLEST SERPL-MCNC: 226 MG/DL (ref 100–199)
CO2 SERPL-SCNC: 25 MMOL/L (ref 20–29)
CREAT SERPL-MCNC: 0.6 MG/DL (ref 0.57–1)
EGFR: 94 ML/MIN/1.73
EOSINOPHIL # BLD AUTO: 0.1 X10E3/UL (ref 0–0.4)
EOSINOPHIL NFR BLD AUTO: 2 %
ERYTHROCYTE [DISTWIDTH] IN BLOOD BY AUTOMATED COUNT: 13.6 % (ref 11.7–15.4)
GLOBULIN SER CALC-MCNC: 2.8 G/DL (ref 1.5–4.5)
GLUCOSE SERPL-MCNC: 93 MG/DL (ref 65–99)
HCT VFR BLD AUTO: 41.1 % (ref 34–46.6)
HCV AB S/CO SERPL IA: <0.1 S/CO RATIO (ref 0–0.9)
HDLC SERPL-MCNC: 58 MG/DL
HGB BLD-MCNC: 13 G/DL (ref 11.1–15.9)
IMM GRANULOCYTES # BLD AUTO: 0 X10E3/UL (ref 0–0.1)
IMM GRANULOCYTES NFR BLD AUTO: 0 %
LDLC SERPL CALC-MCNC: 150 MG/DL (ref 0–99)
LYMPHOCYTES # BLD AUTO: 1.5 X10E3/UL (ref 0.7–3.1)
LYMPHOCYTES NFR BLD AUTO: 28 %
MCH RBC QN AUTO: 26.5 PG (ref 26.6–33)
MCHC RBC AUTO-ENTMCNC: 31.6 G/DL (ref 31.5–35.7)
MCV RBC AUTO: 84 FL (ref 79–97)
MONOCYTES # BLD AUTO: 0.4 X10E3/UL (ref 0.1–0.9)
MONOCYTES NFR BLD AUTO: 7 %
NEUTROPHILS # BLD AUTO: 3.1 X10E3/UL (ref 1.4–7)
NEUTROPHILS NFR BLD AUTO: 62 %
PLATELET # BLD AUTO: 273 X10E3/UL (ref 150–450)
POTASSIUM SERPL-SCNC: 5.1 MMOL/L (ref 3.5–5.2)
PROT SERPL-MCNC: 7.6 G/DL (ref 6–8.5)
RBC # BLD AUTO: 4.9 X10E6/UL (ref 3.77–5.28)
SODIUM SERPL-SCNC: 144 MMOL/L (ref 134–144)
TRIGL SERPL-MCNC: 103 MG/DL (ref 0–149)
VLDLC SERPL CALC-MCNC: 18 MG/DL (ref 5–40)
WBC # BLD AUTO: 5.1 X10E3/UL (ref 3.4–10.8)

## 2022-05-05 NOTE — ACP (ADVANCE CARE PLANNING)
Advance Care Planning   Ambulatory ACP Specialist Patient Outreach    Date:  5/5/2022    ACP Specialist:  Herman Lopez LPN    Outreach call to patient in follow-up to ACP Specialist referral from:    [x] PCP  [] Provider   [] Ambulatory Care Management [] Other     For:                  [] Advance Directive Assistance              [] Complete Portable DNR order              [] Complete POST/MOST              [] Code Status Discussion             [x] Discuss Goals of Care             [x] Early ACP Decision-Making              [] Other (Specify)    Date Referral Received: 5/4/22    Today's Outreach:  [x] First   [] Second  [] Third       Third outreach made by: [] Phone  [] Email / mail    [] [email or web address]     Intervention:  [x] Spoke with Patient   [] Left VM requesting return call      Outcome:  LPN spoke with the pt who wishes to move forward in having an ACP conversation with an ACP specialist. Pt is alert and oriented x4. Appointment scheduled for 5/9/22  at 11 am ACP information has been e-mailed to the pt for review prior to the appointment. Next Step:   [x] ACP scheduled conversation  [] Outreach again in one week               [x] Email / Mail ACP Info Sheets  [] Email / Mail Advance Directive   [] Closing referral.  Routing closure to referring provider/staff and to ACP Specialist . [] Closure letter mailed to patient with invitation to contact ACP Specialist if / when ready.   Thank you for this referral.

## 2022-05-09 ENCOUNTER — DOCUMENTATION ONLY (OUTPATIENT)
Dept: CASE MANAGEMENT | Age: 77
End: 2022-05-09

## 2022-05-09 NOTE — ACP (ADVANCE CARE PLANNING)
Advance Care Planning     Advance Care Planning Clinical Specialist  Conversation Note      Date of ACP Conversation: 05/16/22, 5/16/22    Conversation Conducted with:  Patient with Decision Making Capacity    ACP Clinical Specialist: Arely Jain, [address] [facility] Decision Maker:    Current Designated Health Care Decision Maker:     Primary Decision Maker: Emiliano Gupta - [phone]    Secondary Decision Maker: Edward Atkinson - Heriberto - [phone]    Supplemental (Other) Decision Maker: Anca Babb - Sister - [phone]    Today we discussed Pt's knowledge about ACPs, identified her HCDMs, and clarified her wishes in various medical scenariois. Care Preferences    Hospitalization: \"If your health worsens and it becomes clear that your chance of recovery is unlikely, what would your preference be regarding hospitalization? \"    Choice:  []  The patient wants hospitalization  [x]  The patient prefers comfort-focused treatment without hospitalization. \"Depending on the situation\" i.e.: depending on whether she could be appropriately cared for within the home. Ventilation: \"If you were in your present state of health and suddenly became very ill and were unable to breathe on your own, what would your preference be about the use of a ventilator (breathing machine) if it were available to you? \"      If patient would desire the use of a ventilator (breathing machine), answer \"yes\", if not \"no\":yes, \"If it was for just a short time and it was going to help me get better, then fine. \"    \"If your health worsens and it becomes clear that your chance of recovery is unlikely, what would your preference be about the use of a ventilator (breathing machine) if it were available to you? \"     Would the patient desire the use of a ventilator (breathing machine)?   NO      Resuscitation  \"CPR works best to restart the heart when there is a sudden event, like a heart attack, in someone who is otherwise healthy. Unfortunately, CPR does not typically restart the heart for people who have serious health conditions or who are very sick. \"    \"In the event your heart stopped as a result of an underlying serious health condition, would you want attempts to be made to restart your heart (answer \"yes\" for attempt to resuscitate) or would you prefer a natural death (answer \"no\" for do not attempt to resuscitate)? \" no, Pt stated she would want to allow herself a natural death in the event that her heart stopped 2/2 to a serious health condition. However, she would want to be resuscitated in the event of an accident. Basic education was provided regarding DDNR, should her wishes change in the future. [x] Yes  [] No   Educated Patient / Cynthia Palin regarding differences between Advance Directives and portable DNR orders. Length of ACP Conversation in minutes:  45m    Conversation Outcomes:  [x] ACP discussion completed  [] Existing advance directive reviewed with patient; no changes to patient's previously recorded wishes   [x] New Advance Directive completed   [] Portable Do Not Resuscitate prepared for Provider review and signature  [] POLST/POST/MOLST/MOST prepared for Provider review and signature      Follow-up plan:    [] Schedule follow-up conversation to continue planning  [] Referred individual to Provider for additional questions/concerns   [x] Advised patient/agent/surrogate to review completed ACP document and update if needed with changes in condition, patient preferences or care setting     [x] This note routed to one or more involved healthcare providers    - Pt was very well engaged. It was evident she had prepared questions for her discussion today. She shared her role as a HCDM for her brother, who ultimately  in . Like many individuals, Pt stated, \"I've been meaning to do it, I just haven't. \" She was able to identify her HCDMs but is still considering a 3rd individual. Additionally, she requested more time to consider several other ACP questions, like organ donation. Given that Pt was not yet ready to complete ACP documents, decision was made to have Specialist f/u with Pt in one week, if Pt has not reached out in the interim. 5/16-  F/U outreach made this morning. Although Pt answered, she was not in a place where she could talk with Specialist. Pt will  back later this afternoon. If Specialist does not hear back from Pt, will attempt f/u again in 24-48hours. ACP information was received Pt and added into her EMR. Her document has been sent via [employer] for her completion today. It will be uploaded into the EMR as soon as it is finalized. At that time, this referral will be recommended for closure.         Demond Fontaine, CASSIAW, ACHP-CANDY  Advance Care   Population Health

## 2022-05-16 ENCOUNTER — DOCUMENTATION ONLY (OUTPATIENT)
Dept: CASE MANAGEMENT | Age: 77
End: 2022-05-16

## 2022-06-09 ENCOUNTER — HOSPITAL ENCOUNTER (OUTPATIENT)
Dept: MAMMOGRAPHY | Age: 77
Discharge: HOME | End: 2022-06-09
Attending: FAMILY MEDICINE
Payer: MEDICARE

## 2022-06-09 DIAGNOSIS — Z78.0 POSTMENOPAUSAL: ICD-10-CM

## 2022-06-09 PROCEDURE — 77080 DXA BONE DENSITY AXIAL: CPT

## 2022-09-01 ENCOUNTER — TELEPHONE (OUTPATIENT)
Dept: INTERNAL MEDICINE CLINIC | Age: 77
End: 2022-09-01

## 2022-10-12 ENCOUNTER — OFFICE VISIT (OUTPATIENT)
Dept: INTERNAL MEDICINE CLINIC | Age: 77
End: 2022-10-12
Payer: MEDICARE

## 2022-10-12 VITALS
OXYGEN SATURATION: 98 % | WEIGHT: 170 LBS | HEART RATE: 80 BPM | SYSTOLIC BLOOD PRESSURE: 139 MMHG | HEIGHT: 65 IN | DIASTOLIC BLOOD PRESSURE: 73 MMHG | RESPIRATION RATE: 12 BRPM | BODY MASS INDEX: 28.32 KG/M2

## 2022-10-12 DIAGNOSIS — M15.9 PRIMARY OSTEOARTHRITIS INVOLVING MULTIPLE JOINTS: Primary | ICD-10-CM

## 2022-10-12 DIAGNOSIS — J30.9 ALLERGIC RHINITIS, UNSPECIFIED SEASONALITY, UNSPECIFIED TRIGGER: ICD-10-CM

## 2022-10-12 PROCEDURE — G8510 SCR DEP NEG, NO PLAN REQD: HCPCS | Performed by: INTERNAL MEDICINE

## 2022-10-12 PROCEDURE — 1090F PRES/ABSN URINE INCON ASSESS: CPT | Performed by: INTERNAL MEDICINE

## 2022-10-12 PROCEDURE — G8427 DOCREV CUR MEDS BY ELIG CLIN: HCPCS | Performed by: INTERNAL MEDICINE

## 2022-10-12 PROCEDURE — 3017F COLORECTAL CA SCREEN DOC REV: CPT | Performed by: INTERNAL MEDICINE

## 2022-10-12 PROCEDURE — G8399 PT W/DXA RESULTS DOCUMENT: HCPCS | Performed by: INTERNAL MEDICINE

## 2022-10-12 PROCEDURE — G8417 CALC BMI ABV UP PARAM F/U: HCPCS | Performed by: INTERNAL MEDICINE

## 2022-10-12 PROCEDURE — 1101F PT FALLS ASSESS-DOCD LE1/YR: CPT | Performed by: INTERNAL MEDICINE

## 2022-10-12 PROCEDURE — G8536 NO DOC ELDER MAL SCRN: HCPCS | Performed by: INTERNAL MEDICINE

## 2022-10-12 PROCEDURE — 99214 OFFICE O/P EST MOD 30 MIN: CPT | Performed by: INTERNAL MEDICINE

## 2022-10-12 RX ORDER — MONTELUKAST SODIUM 10 MG/1
10 TABLET ORAL AS NEEDED
Qty: 90 TABLET | Refills: 1 | Status: SHIPPED | OUTPATIENT
Start: 2022-10-12

## 2022-10-12 RX ORDER — CAPSAICIN 0.1 %
CREAM (GRAM) TOPICAL
Qty: 60 G | Refills: 5 | Status: SHIPPED | OUTPATIENT
Start: 2022-10-12

## 2022-10-12 RX ORDER — DICLOFENAC SODIUM 10 MG/G
2 GEL TOPICAL 4 TIMES DAILY
Qty: 100 G | Refills: 5 | Status: SHIPPED | OUTPATIENT
Start: 2022-10-12

## 2022-10-12 NOTE — PROGRESS NOTES
Cisco Rossi is a 77 y.o. female and presents with Follow-up    She says her left shoulder pain is now better, it comes and goes, sometimes she has pain in her hip, her knees. She noticed a lump in her left wrist that is tender last night. Sometimes sh has pain in her thumbs intermitetntly, no stiffness. Has pain in her lower back intermittently. She says sometimes she takes tylenol or ibuprofen as needed for the pain, she says these help relieve the pain. She says she does not exercise as much as she should. She has a hammertoe in her right foot which she feels affects her balance, but no falls. She says at night time her right leg from her knee down swells, no cramping. She says she did rejuvenex injections in both her kneess, she says it helped maybe for a couple of months. She did xrays     She got her flu shot this season already. Colon cancer screening: was done by Dr. Aiden Moreno, she says she's not due yet. Review of Systems  Review of Systems   Constitutional:  Negative for chills, fatigue, fever and unexpected weight change. HENT:  Negative for congestion, ear pain, sneezing and sore throat. Eyes:  Negative for pain and discharge. Respiratory:  Negative for cough, shortness of breath and wheezing. Cardiovascular:  Negative for chest pain, palpitations and leg swelling. Gastrointestinal:  Negative for abdominal pain, blood in stool, constipation and diarrhea. Endocrine: Negative for polydipsia and polyuria. Genitourinary:  Negative for difficulty urinating, dysuria, frequency, hematuria and urgency. Musculoskeletal:  Negative for arthralgias, back pain and joint swelling. Skin:  Negative for rash. Allergic/Immunologic: Negative for environmental allergies and food allergies. Neurological:  Negative for dizziness, speech difficulty, weakness, light-headedness, numbness and headaches. Hematological:  Negative for adenopathy.    Psychiatric/Behavioral:  Negative for behavioral problems (Depression), sleep disturbance and suicidal ideas. Past Medical History:   Diagnosis Date    Arthritis      Past Surgical History:   Procedure Laterality Date    HX BREAST BIOPSY      HX BREAST BIOPSY Left 10/28/2020    Left Breast Wire Localized Excisional Biopsy performed by Nikolai Martinez MD at [address]    HX COLONOSCOPY      HX GYN      hysterectomy    HX HERNIA REPAIR      HX HERNIA REPAIR      HX HERNIA REPAIR  07/18/2021    HX ORTHOPAEDIC      knee     Social History     Socioeconomic History    Marital status:    Tobacco Use    Smoking status: Never    Smokeless tobacco: Never   Vaping Use    Vaping Use: Never used   Substance and Sexual Activity    Alcohol use: Yes     Alcohol/week: 1.0 standard drink     Types: 1 Glasses of wine per week    Drug use: Never     Social Determinants of Health     Financial Resource Strain: Low Risk     Difficulty of Paying Living Expenses: Not hard at all   Food Insecurity: No Food Insecurity    Worried About Running Out of Food in the Last Year: Never true    Ran Out of Food in the Last Year: Never true     Family History   Problem Relation Age of Onset    Cancer Mother     Cancer Father     Heart Disease Father      Current Outpatient Medications   Medication Sig Dispense Refill    montelukast (Singulair) 10 mg tablet Take 1 Tablet by mouth as needed for PRN Reason (Other) (Allergies). 90 Tablet 1    diclofenac (VOLTAREN) 1 % gel Apply 2 g to affected area four (4) times daily. 100 g 5    capsaicin (CAPZASIN-HP) 0.1 % topical cream Apply  to affected area four (4) times daily as needed for Pain. 60 g 5    fluticasone propionate (FLONASE) 50 mcg/actuation nasal spray 2 Sprays by Both Nostrils route as needed. homeopathic drugs (MISC NATURAL PRODUCT OP) Take  by mouth. High potency grapine      naproxen sodium (NAPROSYN) 220 mg tablet Take 220 mg by mouth two (2) times daily (with meals).       calcium-cholecalciferol, d3, (CALCIUM 600 + D) 600-125 mg-unit tab Take 600 mg by mouth daily. echinacea 400 mg cap Take 400 mg by mouth daily. magnesium 250 mg tab Take 250 mg by mouth daily. No Known Allergies    Objective:  Visit Vitals  /73 (BP 1 Location: Left upper arm, BP Patient Position: Sitting, BP Cuff Size: Small adult)   Pulse 80   Resp 12   Ht 5' 5\" (1.651 m)   Wt 170 lb (77.1 kg)   SpO2 98%   BMI 28.29 kg/m²     Physical Exam:   Physical Exam  Constitutional:       General: She is not in acute distress. Appearance: Normal appearance. HENT:      Head: Normocephalic and atraumatic. Mouth/Throat:      Mouth: Mucous membranes are moist.   Eyes:      Extraocular Movements: Extraocular movements intact. Conjunctiva/sclera: Conjunctivae normal.      Pupils: Pupils are equal, round, and reactive to light. Cardiovascular:      Rate and Rhythm: Normal rate and regular rhythm. Pulses: Normal pulses. Heart sounds: Normal heart sounds. Pulmonary:      Effort: Pulmonary effort is normal.      Breath sounds: Normal breath sounds. Abdominal:      General: Abdomen is flat. Bowel sounds are normal. There is no distension. Palpations: Abdomen is soft. There is no mass. Tenderness: no abdominal tenderness   Musculoskeletal:         General: No swelling or deformity. Cervical back: Normal range of motion and neck supple. Right lower leg: No edema. Left lower leg: No edema. Lymphadenopathy:      Cervical: No cervical adenopathy. Skin:     General: Skin is warm and dry. Capillary Refill: Capillary refill takes less than 2 seconds. Coloration: Skin is not jaundiced or pale. Findings: No erythema or rash. Neurological:      General: No focal deficit present. Mental Status: She is alert and oriented to person, place, and time. Psychiatric:         Mood and Affect: Mood normal.         Behavior: Behavior normal.         Thought Content:  Thought content normal.         Judgment: Judgment normal.        Results for orders placed or performed in visit on 05/04/22   LIPID PANEL   Result Value Ref Range    Cholesterol, total 226 (H) 100 - 199 mg/dL    Triglyceride 103 0 - 149 mg/dL    HDL Cholesterol 58 >39 mg/dL    VLDL, calculated 18 5 - 40 mg/dL    LDL, calculated 150 (H) 0 - 99 mg/dL   CBC WITH AUTOMATED DIFF   Result Value Ref Range    WBC 5.1 3.4 - 10.8 x10E3/uL    RBC 4.90 3.77 - 5.28 x10E6/uL    HGB 13.0 11.1 - 15.9 g/dL    HCT 41.1 34.0 - 46.6 %    MCV 84 79 - 97 fL    MCH 26.5 (L) 26.6 - 33.0 pg    MCHC 31.6 31.5 - 35.7 g/dL    RDW 13.6 11.7 - 15.4 %    PLATELET 065 233 - 397 x10E3/uL    NEUTROPHILS 62 Not Estab. %    Lymphocytes 28 Not Estab. %    MONOCYTES 7 Not Estab. %    EOSINOPHILS 2 Not Estab. %    BASOPHILS 1 Not Estab. %    ABS. NEUTROPHILS 3.1 1.4 - 7.0 x10E3/uL    Abs Lymphocytes 1.5 0.7 - 3.1 x10E3/uL    ABS. MONOCYTES 0.4 0.1 - 0.9 x10E3/uL    ABS. EOSINOPHILS 0.1 0.0 - 0.4 x10E3/uL    ABS. BASOPHILS 0.0 0.0 - 0.2 x10E3/uL    IMMATURE GRANULOCYTES 0 Not Estab. %    ABS. IMM. GRANS. 0.0 0.0 - 0.1 Q48U7/IX   METABOLIC PANEL, COMPREHENSIVE   Result Value Ref Range    Glucose 93 65 - 99 mg/dL    BUN 12 8 - 27 mg/dL    Creatinine 0.60 0.57 - 1.00 mg/dL    eGFR 94 >59 mL/min/1.73    BUN/Creatinine ratio 20 12 - 28    Sodium 144 134 - 144 mmol/L    Potassium 5.1 3.5 - 5.2 mmol/L    Chloride 103 96 - 106 mmol/L    CO2 25 20 - 29 mmol/L    Calcium 10.1 8.7 - 10.3 mg/dL    Protein, total 7.6 6.0 - 8.5 g/dL    Albumin 4.8 (H) 3.7 - 4.7 g/dL    GLOBULIN, TOTAL 2.8 1.5 - 4.5 g/dL    A-G Ratio 1.7 1.2 - 2.2    Bilirubin, total 0.5 0.0 - 1.2 mg/dL    Alk. phosphatase 89 44 - 121 IU/L    AST (SGOT) 19 0 - 40 IU/L    ALT (SGPT) 17 0 - 32 IU/L   HEPATITIS C AB   Result Value Ref Range    Hep C Virus Ab <0.1 0.0 - 0.9 s/co ratio       Assessment/Plan:    ICD-10-CM ICD-9-CM    1.  Primary osteoarthritis involving multiple joints  M15.9 715.98 diclofenac (VOLTAREN) 1 % gel      capsaicin (CAPZASIN-HP) 0.1 % topical cream      2. Allergic rhinitis, unspecified seasonality, unspecified trigger  J30.9 477.9 montelukast (Singulair) 10 mg tablet        Orders Placed This Encounter    montelukast (Singulair) 10 mg tablet     Sig: Take 1 Tablet by mouth as needed for PRN Reason (Other) (Allergies). Dispense:  90 Tablet     Refill:  1    diclofenac (VOLTAREN) 1 % gel     Sig: Apply 2 g to affected area four (4) times daily. Dispense:  100 g     Refill:  5    capsaicin (CAPZASIN-HP) 0.1 % topical cream     Sig: Apply  to affected area four (4) times daily as needed for Pain. Dispense:  60 g     Refill:  5     call if any problems  There are no Patient Instructions on file for this visit. Follow-up and Dispositions    Return in about 32 weeks (around 5/24/2023) for Medicare wellness.

## 2022-10-12 NOTE — PROGRESS NOTES
Chief Complaint   Patient presents with    Follow-up     Visit Vitals  /73 (BP 1 Location: Left upper arm, BP Patient Position: Sitting, BP Cuff Size: Small adult)   Pulse 80   Resp 12   Ht 5' 5\" (1.651 m)   Wt 170 lb (77.1 kg)   SpO2 98%   BMI 28.29 kg/m²       1. \"Have you been to the ER, urgent care clinic since your last visit? Hospitalized since your last visit? \" No    2. \"Have you seen or consulted any other health care providers outside of the [address] [place] [address] since your last visit? \" No     3. For patients aged 39-70: Has the patient had a colonoscopy / FIT/ Cologuard? Yes - Care Gap present. Rooming MA/LPN to request most recent results      If the patient is female:    4. For patients aged 41-77: Has the patient had a mammogram within the past 2 years? NA - based on age or sex      11. For patients aged 21-65: Has the patient had a pap smear?  NA - based on age or sex Patient is a 87y old  Male who presents with a chief complaint of Confusion, dyspnea (23 Mar 2020 18:23)      Patient seen and examined at bedside. Baseline dementia. Pt states that he could not breath and c/o right leg swelling and numbness. Pt appears anxious.     ALLERGIES:  Aricept (Other)  Cipro (Unknown)  Demerol HCl (Unknown)    MEDICATIONS  (STANDING):  ALBUTerol    90 MICROgram(s) HFA Inhaler 2 Puff(s) Inhalation every 6 hours  amLODIPine   Tablet 5 milliGRAM(s) Oral daily  aspirin enteric coated 81 milliGRAM(s) Oral daily  atorvastatin 10 milliGRAM(s) Oral at bedtime  buPROPion XL . 150 milliGRAM(s) Oral daily  calcium carbonate 1250 mG  + Vitamin D (OsCal 500 + D) 1 Tablet(s) Oral daily  calcium gluconate IVPB 2 Gram(s) IV Intermittent once  dextrose 5%. 1000 milliLiter(s) (50 mL/Hr) IV Continuous <Continuous>  dextrose 50% Injectable 12.5 Gram(s) IV Push once  dextrose 50% Injectable 25 Gram(s) IV Push once  dextrose 50% Injectable 25 Gram(s) IV Push once  heparin  Injectable 5000 Unit(s) SubCutaneous every 8 hours  insulin lispro (HumaLOG) corrective regimen sliding scale   SubCutaneous three times a day before meals  insulin lispro (HumaLOG) corrective regimen sliding scale   SubCutaneous at bedtime  methylPREDNISolone sodium succinate Injectable 40 milliGRAM(s) IV Push daily  sertraline 100 milliGRAM(s) Oral daily  sodium bicarbonate 1300 milliGRAM(s) Oral two times a day    MEDICATIONS  (PRN):  acetaminophen   Tablet .. 650 milliGRAM(s) Oral every 4 hours PRN Mild Pain (1 - 3)  dextrose 40% Gel 15 Gram(s) Oral once PRN Blood Glucose LESS THAN 70 milliGRAM(s)/deciliter  glucagon  Injectable 1 milliGRAM(s) IntraMuscular once PRN Glucose LESS THAN 70 milligrams/deciliter    Vital Signs Last 24 Hrs  T(F): 97.8 (24 Mar 2020 12:25), Max: 98 (23 Mar 2020 16:05)  HR: 77 (24 Mar 2020 12:25) (67 - 88)  BP: 144/66 (24 Mar 2020 12:25) (123/70 - 149/70)  RR: 22 (24 Mar 2020 12:25) (19 - 24)  SpO2: 100% (24 Mar 2020 12:25) (96% - 100%)  I&O's Summary    24 Mar 2020 07:01  -  24 Mar 2020 14:54  --------------------------------------------------------  IN: 510 mL / OUT: 580 mL / NET: -70 mL      GENERAL: appears anxious. tachypneic and using accessory muscle   HEAD:  Atraumatic, Normocephalic  EYES: EOMI, PERRLA, sclera clear  NECK: Supple  CHEST/LUNG: Clear to auscultation bilaterally;  wheezes from upper throat area  HEART: Regular rate and rhythm; No murmurs  ABDOMEN: Soft, Nontender, Nondistended; Bowel sounds present  EXTREMITIES: No clubbing, cyanosis, or edema  NEUROLOGY: non-focal.   SKIN: faint rash vs old scabs on right leg and bilateral arm    LABS:                        10.0   10.98 )-----------( 293      ( 24 Mar 2020 07:30 )             32.0         140  |  103  |  63  ----------------------------<  174  4.4   |  13  |  3.12    Ca    <5.0      24 Mar 2020 07:30  Phos  5.4       Mg     1.9         TPro  7.0  /  Alb  2.7  /  TBili  0.4  /  DBili  x   /  AST  98  /  ALT  34  /  AlkPhos  240          eGFR if Non African American: 17 mL/min/1.73M2 (20 @ 07:30)  eGFR if African American: 20 mL/min/1.73M2 (20 @ 07:30)    PT/INR - ( 23 Mar 2020 16:25 )   PT: 13.5 sec;   INR: 1.20 ratio         PTT - ( 23 Mar 2020 16:25 )  PTT:34.6 sec  Lactate, Blood: 1.4 mmol/L ( @ 16:30)      CARDIAC MARKERS ( 23 Mar 2020 17:10 )  <.017 ng/mL / x     / x     / x     / x            TSH 1.01   TSH with FT4 reflex --  Total T3 --      ABG - ( 24 Mar 2020 13:12 )  pH, Arterial: 7.24  pH, Blood: x     /  pCO2: 29    /  pO2: 120   / HCO3: x     / Base Excess: x     /  SaO2: 97            POCT Blood Glucose.: 187 mg/dL (24 Mar 2020 12:02)  POCT Blood Glucose.: 166 mg/dL (24 Mar 2020 08:43)  POCT Blood Glucose.: 209 mg/dL (23 Mar 2020 22:12)    03-24 [id number] 5.6    Urinalysis Basic - ( 23 Mar 2020 16:25 )    Color: Yellow / Appearance: Clear / S.010 / pH: x  Gluc: x / Ketone: Negative  / Bili: Negative / Urobili: Negative   Blood: x / Protein: 500 mg/dL / Nitrite: Negative   Leuk Esterase: Negative / RBC: 5-10 /HPF / WBC 0-2 /HPF   Sq Epi: x / Non Sq Epi: Neg.-Few / Bacteria: Few /HPF        RADIOLOGY & ADDITIONAL TESTS:    Care Discussed with Consultants/Other Providers:

## 2022-10-20 ENCOUNTER — OFFICE VISIT (OUTPATIENT)
Dept: PODIATRY | Age: 77
End: 2022-10-20
Payer: MEDICARE

## 2022-10-20 VITALS
HEIGHT: 65 IN | OXYGEN SATURATION: 98 % | BODY MASS INDEX: 28.76 KG/M2 | SYSTOLIC BLOOD PRESSURE: 169 MMHG | DIASTOLIC BLOOD PRESSURE: 71 MMHG | HEART RATE: 71 BPM | WEIGHT: 172.6 LBS | TEMPERATURE: 96.8 F

## 2022-10-20 DIAGNOSIS — M20.11 HAV (HALLUX ABDUCTO VALGUS), RIGHT: ICD-10-CM

## 2022-10-20 DIAGNOSIS — M79.671 RIGHT FOOT PAIN: ICD-10-CM

## 2022-10-20 DIAGNOSIS — M25.571 RIGHT ANKLE PAIN, UNSPECIFIED CHRONICITY: Primary | ICD-10-CM

## 2022-10-20 PROCEDURE — G8417 CALC BMI ABV UP PARAM F/U: HCPCS | Performed by: PODIATRIST

## 2022-10-20 PROCEDURE — 99213 OFFICE O/P EST LOW 20 MIN: CPT | Performed by: PODIATRIST

## 2022-10-20 PROCEDURE — 73630 X-RAY EXAM OF FOOT: CPT | Performed by: PODIATRIST

## 2022-10-20 PROCEDURE — G8536 NO DOC ELDER MAL SCRN: HCPCS | Performed by: PODIATRIST

## 2022-10-20 PROCEDURE — G8399 PT W/DXA RESULTS DOCUMENT: HCPCS | Performed by: PODIATRIST

## 2022-10-20 PROCEDURE — G8427 DOCREV CUR MEDS BY ELIG CLIN: HCPCS | Performed by: PODIATRIST

## 2022-10-20 PROCEDURE — 1101F PT FALLS ASSESS-DOCD LE1/YR: CPT | Performed by: PODIATRIST

## 2022-10-20 PROCEDURE — G8432 DEP SCR NOT DOC, RNG: HCPCS | Performed by: PODIATRIST

## 2022-10-20 PROCEDURE — 3017F COLORECTAL CA SCREEN DOC REV: CPT | Performed by: PODIATRIST

## 2022-10-20 PROCEDURE — 1090F PRES/ABSN URINE INCON ASSESS: CPT | Performed by: PODIATRIST

## 2022-10-20 PROCEDURE — 1123F ACP DISCUSS/DSCN MKR DOCD: CPT | Performed by: PODIATRIST

## 2022-10-20 NOTE — PROGRESS NOTES
1. \"Have you been to the ER, urgent care clinic since your last visit? Hospitalized since your last visit? \" No    2. \"Have you seen or consulted any other health care providers outside of the [address] [place] [address] since your last visit? \" No     3. For patients aged 39-70: Has the patient had a colonoscopy / FIT/ Cologuard? Yes - no Care Gap present      If the patient is female:    4. For patients aged 41-77: Has the patient had a mammogram within the past 2 years? Yes - no Care Gap present      5. For patients aged 21-65: Has the patient had a pap smear?  Yes - no Care Gap present    Chief Complaint   Patient presents with    Foot Pain     Right foot pain

## 2022-10-31 NOTE — PROGRESS NOTES
[facility]    Subjective:         Patient is a 77 y.o. female who is being seen as a returning pt for right foot pain. Patient states she is suffering with the pain for the past several weeks, with increasing severity. She states pain at rest level of 3 out of 10. She states the pain is exacerbated with activity and close toed shoes. She denies any overt trauma. She denies break in skin. She denies any local/systemic signs of infection. She denies any recent diagnostic testing to confirm her diagnosis. She denies any other lower extremity complaints    Past Medical History:   Diagnosis Date    Arthritis      Past Surgical History:   Procedure Laterality Date    HX BREAST BIOPSY      HX BREAST BIOPSY Left 10/28/2020    Left Breast Wire Localized Excisional Biopsy performed by Светлана Harvey MD [address]    HX COLONOSCOPY      HX GYN      hysterectomy    HX HERNIA REPAIR      HX HERNIA REPAIR      HX HERNIA REPAIR  07/18/2021    HX ORTHOPAEDIC      knee       Family History   Problem Relation Age of Onset    Cancer Mother         Tumor behind eye    Cancer Father         Brain tumor    Heart Disease Father       Social History     Tobacco Use    Smoking status: Never    Smokeless tobacco: Never   Substance Use Topics    Alcohol use: Yes     Alcohol/week: 1.0 standard drink     Types: 1 Glasses of wine per week     Comment: not monthly     No Known Allergies  Prior to Admission medications    Medication Sig Start Date End Date Taking? Authorizing Provider   montelukast (Singulair) 10 mg tablet Take 1 Tablet by mouth as needed for PRN Reason (Other) (Allergies). 10/12/22   Herlinda Hudson MD   diclofenac (VOLTAREN) 1 % gel Apply 2 g to affected area four (4) times daily. 10/12/22   Herlinda Hudson MD   capsaicin (CAPZASIN-HP) 0.1 % topical cream Apply  to affected area four (4) times daily as needed for Pain.  10/12/22   [address], Carmita Tinsley MD   fluticasone propionate (FLONASE) 50 mcg/actuation nasal spray 2 Sprays by Both Nostrils route as needed. 4/29/22   Provider, Historical   homeopathic drugs (MISC NATURAL PRODUCT OP) Take  by mouth. High potency grapine    Provider, Historical   naproxen sodium (NAPROSYN) 220 mg tablet Take 220 mg by mouth two (2) times daily (with meals). Provider, Historical   calcium-cholecalciferol, d3, (CALCIUM 600 + D) 600-125 mg-unit tab Take 600 mg by mouth daily. Provider, Historical   echinacea 400 mg cap Take 400 mg by mouth daily. Provider, Historical   magnesium 250 mg tab Take 250 mg by mouth daily. Provider, Historical       Review of Systems   Constitutional: Negative. HENT: Negative. Eyes: Negative. Respiratory: Negative. Cardiovascular: Negative. Gastrointestinal: Negative. Endocrine: Negative. Genitourinary: Negative. Musculoskeletal: Negative. Skin: Negative. Allergic/Immunologic: Negative. Neurological: Negative. Hematological: Negative. Psychiatric/Behavioral: Negative. All other systems reviewed and are negative. Objective:     Visit Vitals  BP (!) 169/71 (BP 1 Location: Right upper arm, BP Patient Position: Sitting, BP Cuff Size: Adult)   Pulse 71   Temp 96.8 °F (36 °C) (Temporal)   Ht 5' 5\" (1.651 m)   Wt 172 lb 9.6 oz (78.3 kg)   SpO2 98%   BMI 28.72 kg/m²       Physical Exam  Vitals reviewed. Constitutional:       Appearance: She is overweight. Cardiovascular:      Pulses:           Dorsalis pedis pulses are 2+ on the right side and 2+ on the left side. Posterior tibial pulses are 2+ on the right side and 2+ on the left side. Pulmonary:      Effort: Pulmonary effort is normal.   Musculoskeletal:      Right lower leg: No edema. Left lower leg: No edema. Right foot: Decreased range of motion. Deformity and bunion present. Left foot: Normal range of motion. No deformity or bunion.    Feet:      Right foot: Protective Sensation: 10 sites tested. 10 sites sensed. Skin integrity: Skin integrity normal.      Toenail Condition: Right toenails are abnormally thick. Left foot:      Protective Sensation: 10 sites tested. 10 sites sensed. Skin integrity: Skin integrity normal.      Toenail Condition: Left toenails are abnormally thick. Lymphadenopathy:      Lower Body: No right inguinal adenopathy. No left inguinal adenopathy. Skin:     General: Skin is warm. Capillary Refill: Capillary refill takes 2 to 3 seconds. Neurological:      Mental Status: She is alert and oriented to person, place, and time. Psychiatric:         Mood and Affect: Mood and affect normal.         Behavior: Behavior is cooperative. Imaging:  EXAM: XR RIGHT MIN 3 V     INDICATION: Right foot pain     COMPARISON: None     FINDINGS: Three views of the right foot demonstrate no acute fracture or dislocation. 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 Planus deformity. Calcaneal spurs noted. There is no other acute osseous or articular abnormality. The soft tissues are within normal limits. Impression:       ICD-10-CM ICD-9-CM    1. Right ankle pain, unspecified chronicity  M25.571 719.47 AMB POC XRAY, ANKLE; COMPLETE, 3+ VIE      2. Right foot pain  M79.671 729.5 AMB POC XRAY, FOOT; COMPLETE, 3+ VIEW            Recommendation:     Patient seen and evaluated in the office  XR of the right foot taken today in the office, personally reviewed and findings discussed with patient  Treatment options reviewed, conservative or surgical.  Possible complications of each treatment option discussed great detail. Patient elected for conservative treatment. She is to utilize over-the-counter anti-inflammatories for oral analgesia          Varun Thornton DPM, CW, [address] [place] [address] [place] [address] [place] [address] [address], [address] [facility] [address] [facility]  O: [phone]  F: [phone]  C: [phone]

## 2022-12-02 ENCOUNTER — TELEPHONE (OUTPATIENT)
Dept: INTERNAL MEDICINE CLINIC | Age: 77
End: 2022-12-02

## 2022-12-02 NOTE — TELEPHONE ENCOUNTER
----- Message from Lety Amado sent at 11/30/2022  9:01 AM EST -----  Subject: Appointment Request    Reason for Call: Established Patient Appointment needed: Semi-Routine   Cough, Cold Symptoms    QUESTIONS    Reason for appointment request? No appointments available during search     Additional Information for Provider? pt need a call back regarding   coughing and bronchitis.  need to discuss apt.   ---------------------------------------------------------------------------  --------------  Wendy GUTIERREZ  [phone]; OK to leave message on voicemail  ---------------------------------------------------------------------------  --------------  SCRIPT ANSWERS  COVID Screen: Red

## 2022-12-06 NOTE — TELEPHONE ENCOUNTER
Called and spoke with pt to inform her of Dr. Nya Bee message. Pt stated that she called last Wednesday to be seen but there were no appts avail so pt was seen at urgent care which she was prescribed antibiotics and cough syrup. Her appt on 12/14 is the follow up appt from being seen at urgent care.

## 2023-01-12 ENCOUNTER — TRANSCRIBE ORDER (OUTPATIENT)
Dept: SCHEDULING | Age: 78
End: 2023-01-12

## 2023-01-12 DIAGNOSIS — R93.89 ABNORMAL CXR (CHEST X-RAY): Primary | ICD-10-CM

## 2023-01-18 ENCOUNTER — HOSPITAL ENCOUNTER (OUTPATIENT)
Dept: CT IMAGING | Age: 78
Discharge: HOME | End: 2023-01-18
Payer: MEDICARE

## 2023-01-18 DIAGNOSIS — R93.89 ABNORMAL CXR (CHEST X-RAY): ICD-10-CM

## 2023-01-18 PROCEDURE — 71250 CT THORAX DX C-: CPT

## 2023-05-02 ENCOUNTER — TELEPHONE (OUTPATIENT)
Dept: INTERNAL MEDICINE CLINIC | Age: 78
End: 2023-05-02

## 2023-05-02 DIAGNOSIS — E78.00 PURE HYPERCHOLESTEROLEMIA: Primary | ICD-10-CM

## 2023-05-04 ENCOUNTER — OFFICE VISIT (OUTPATIENT)
Dept: INTERNAL MEDICINE CLINIC | Age: 78
End: 2023-05-04
Payer: MEDICARE

## 2023-05-04 VITALS
DIASTOLIC BLOOD PRESSURE: 76 MMHG | SYSTOLIC BLOOD PRESSURE: 134 MMHG | HEART RATE: 74 BPM | WEIGHT: 173.2 LBS | HEIGHT: 65 IN | RESPIRATION RATE: 18 BRPM | BODY MASS INDEX: 28.86 KG/M2 | TEMPERATURE: 97.1 F | OXYGEN SATURATION: 99 %

## 2023-05-04 DIAGNOSIS — Z00.00 MEDICARE ANNUAL WELLNESS VISIT, SUBSEQUENT: Primary | ICD-10-CM

## 2023-05-04 LAB
ALBUMIN SERPL-MCNC: 4.3 G/DL (ref 3.7–4.7)
ALBUMIN/GLOB SERPL: 1.6 {RATIO} (ref 1.2–2.2)
ALP SERPL-CCNC: 76 IU/L (ref 44–121)
ALT SERPL-CCNC: 12 IU/L (ref 0–32)
AST SERPL-CCNC: 15 IU/L (ref 0–40)
BASOPHILS # BLD AUTO: 0.1 X10E3/UL (ref 0–0.2)
BASOPHILS NFR BLD AUTO: 1 %
BILIRUB SERPL-MCNC: 0.5 MG/DL (ref 0–1.2)
BUN SERPL-MCNC: 15 MG/DL (ref 8–27)
BUN/CREAT SERPL: 25 (ref 12–28)
CALCIUM SERPL-MCNC: 9.6 MG/DL (ref 8.7–10.3)
CHLORIDE SERPL-SCNC: 104 MMOL/L (ref 96–106)
CHOLEST SERPL-MCNC: 208 MG/DL (ref 100–199)
CO2 SERPL-SCNC: 25 MMOL/L (ref 20–29)
CREAT SERPL-MCNC: 0.61 MG/DL (ref 0.57–1)
EGFRCR SERPLBLD CKD-EPI 2021: 93 ML/MIN/1.73
EOSINOPHIL # BLD AUTO: 0.2 X10E3/UL (ref 0–0.4)
EOSINOPHIL NFR BLD AUTO: 3 %
ERYTHROCYTE [DISTWIDTH] IN BLOOD BY AUTOMATED COUNT: 13.8 % (ref 11.7–15.4)
GLOBULIN SER CALC-MCNC: 2.7 G/DL (ref 1.5–4.5)
GLUCOSE SERPL-MCNC: 93 MG/DL (ref 70–99)
HCT VFR BLD AUTO: 38.5 % (ref 34–46.6)
HDLC SERPL-MCNC: 56 MG/DL
HGB BLD-MCNC: 12.3 G/DL (ref 11.1–15.9)
IMM GRANULOCYTES # BLD AUTO: 0 X10E3/UL (ref 0–0.1)
IMM GRANULOCYTES NFR BLD AUTO: 0 %
LDLC SERPL CALC-MCNC: 140 MG/DL (ref 0–99)
LYMPHOCYTES # BLD AUTO: 1.4 X10E3/UL (ref 0.7–3.1)
LYMPHOCYTES NFR BLD AUTO: 26 %
MCH RBC QN AUTO: 26.1 PG (ref 26.6–33)
MCHC RBC AUTO-ENTMCNC: 31.9 G/DL (ref 31.5–35.7)
MCV RBC AUTO: 82 FL (ref 79–97)
MONOCYTES # BLD AUTO: 0.4 X10E3/UL (ref 0.1–0.9)
MONOCYTES NFR BLD AUTO: 8 %
NEUTROPHILS # BLD AUTO: 3.5 X10E3/UL (ref 1.4–7)
NEUTROPHILS NFR BLD AUTO: 62 %
PLATELET # BLD AUTO: 245 X10E3/UL (ref 150–450)
POTASSIUM SERPL-SCNC: 4.8 MMOL/L (ref 3.5–5.2)
PROT SERPL-MCNC: 7 G/DL (ref 6–8.5)
RBC # BLD AUTO: 4.71 X10E6/UL (ref 3.77–5.28)
SODIUM SERPL-SCNC: 140 MMOL/L (ref 134–144)
TRIGL SERPL-MCNC: 68 MG/DL (ref 0–149)
VLDLC SERPL CALC-MCNC: 12 MG/DL (ref 5–40)
WBC # BLD AUTO: 5.5 X10E3/UL (ref 3.4–10.8)

## 2023-05-04 PROCEDURE — G8510 SCR DEP NEG, NO PLAN REQD: HCPCS | Performed by: INTERNAL MEDICINE

## 2023-05-04 PROCEDURE — G0439 PPPS, SUBSEQ VISIT: HCPCS | Performed by: INTERNAL MEDICINE

## 2023-05-04 PROCEDURE — 1101F PT FALLS ASSESS-DOCD LE1/YR: CPT | Performed by: INTERNAL MEDICINE

## 2023-05-04 PROCEDURE — G8536 NO DOC ELDER MAL SCRN: HCPCS | Performed by: INTERNAL MEDICINE

## 2023-05-04 PROCEDURE — G8399 PT W/DXA RESULTS DOCUMENT: HCPCS | Performed by: INTERNAL MEDICINE

## 2023-05-04 PROCEDURE — G8417 CALC BMI ABV UP PARAM F/U: HCPCS | Performed by: INTERNAL MEDICINE

## 2023-05-04 PROCEDURE — G8427 DOCREV CUR MEDS BY ELIG CLIN: HCPCS | Performed by: INTERNAL MEDICINE

## 2023-05-19 RX ORDER — FLUTICASONE PROPIONATE 50 MCG
2 SPRAY, SUSPENSION (ML) NASAL PRN
COMMUNITY
Start: 2023-04-07

## 2023-05-19 RX ORDER — ASPIRIN 81 MG
TABLET,CHEWABLE ORAL 4 TIMES DAILY PRN
COMMUNITY
Start: 2022-10-12 | End: 2023-07-17

## 2023-05-19 RX ORDER — MONTELUKAST SODIUM 10 MG/1
10 TABLET ORAL PRN
COMMUNITY
Start: 2022-10-12

## 2023-06-22 ENCOUNTER — OFFICE VISIT (OUTPATIENT)
Age: 78
End: 2023-06-22

## 2023-06-22 VITALS — BODY MASS INDEX: 29.16 KG/M2 | HEIGHT: 65 IN | WEIGHT: 175 LBS

## 2023-06-22 DIAGNOSIS — M25.562 PAIN IN BOTH KNEES, UNSPECIFIED CHRONICITY: Primary | ICD-10-CM

## 2023-06-22 DIAGNOSIS — M25.561 PAIN IN BOTH KNEES, UNSPECIFIED CHRONICITY: Primary | ICD-10-CM

## 2023-06-22 DIAGNOSIS — M17.0 OSTEOARTHRITIS OF BOTH KNEES, UNSPECIFIED OSTEOARTHRITIS TYPE: ICD-10-CM

## 2023-06-22 DIAGNOSIS — Z01.818 PRE-OP TESTING: ICD-10-CM

## 2023-06-22 NOTE — PROGRESS NOTES
Name: Coleman Lewis    : 1945     [facility]  [facility] [address] [facility]  [address] [address]  Dept: [phone]  Dept Fax: [phone]     Chief Complaint   Patient presents with    Knee Pain        Ht 5' 5\" (1.651 m)   Wt 175 lb (79.4 kg)   BMI 29.12 kg/m²      No Known Allergies     Current Outpatient Medications   Medication Sig Dispense Refill    Calcium Carbonate-Vitamin D 600-3. 125 MG-MCG TABS Take 600 mg by mouth daily      Capsaicin 0.1 % CREA Apply topically 4 times daily as needed      diclofenac sodium (VOLTAREN) 1 % GEL Apply 2 g topically 4 times daily      fluticasone (FLONASE) 50 MCG/ACT nasal spray 2 sprays by Nasal route as needed      montelukast (SINGULAIR) 10 MG tablet Take 1 tablet by mouth as needed       No current facility-administered medications for this visit.       Patient Active Problem List   Diagnosis    Diverticulosis    Abnormal breast thermography    Onychomycosis    Spigelian hernia with bowel obstruction    Hyperlipidemia      Family History   Problem Relation Age of Onset    Heart Disease Father     Cancer Father         Brain tumor    Cancer Mother         Tumor behind eye      Social History     Socioeconomic History    Marital status:      Spouse name: None    Number of children: None    Years of education: None    Highest education level: None   Tobacco Use    Smoking status: Never    Smokeless tobacco: Never   Substance and Sexual Activity    Alcohol use: Never     Alcohol/week: 1.0 standard drink    Drug use: Never     Social Determinants of Health     Intimate Partner Violence: Not At Risk    Fear of Current or Ex-Partner: No    Emotionally Abused: No    Physically Abused: No    Sexually Abused: No      Past Surgical History:   Procedure Laterality Date    BREAST BIOPSY Left 10/28/2020    Left Breast Wire Localized Excisional Biopsy performed by Zeny Acosta MD at

## 2023-07-05 ENCOUNTER — HOSPITAL ENCOUNTER (OUTPATIENT)
Facility: HOSPITAL | Age: 78
Discharge: HOME | End: 2023-07-08
Attending: ORTHOPAEDIC SURGERY
Payer: MEDICARE

## 2023-07-05 ENCOUNTER — HOSPITAL ENCOUNTER (OUTPATIENT)
Facility: HOSPITAL | Age: 78
Discharge: HOME | End: 2023-07-07
Attending: ORTHOPAEDIC SURGERY
Payer: MEDICARE

## 2023-07-05 DIAGNOSIS — M25.561 PAIN IN BOTH KNEES, UNSPECIFIED CHRONICITY: ICD-10-CM

## 2023-07-05 DIAGNOSIS — M25.562 PAIN IN BOTH KNEES, UNSPECIFIED CHRONICITY: ICD-10-CM

## 2023-07-05 DIAGNOSIS — M17.0 OSTEOARTHRITIS OF BOTH KNEES, UNSPECIFIED OSTEOARTHRITIS TYPE: ICD-10-CM

## 2023-07-05 DIAGNOSIS — Z01.818 PRE-OP TESTING: ICD-10-CM

## 2023-07-05 LAB
ANION GAP SERPL CALC-SCNC: 3 MMOL/L (ref 5–15)
BUN SERPL-MCNC: 16 MG/DL (ref 6–20)
BUN/CREAT SERPL: 25 (ref 12–20)
CA-I BLD-MCNC: 9.1 MG/DL (ref 8.5–10.1)
CHLORIDE SERPL-SCNC: 110 MMOL/L (ref 97–108)
CO2 SERPL-SCNC: 28 MMOL/L (ref 21–32)
CREAT SERPL-MCNC: 0.63 MG/DL (ref 0.55–1.02)
ERYTHROCYTE [DISTWIDTH] IN BLOOD BY AUTOMATED COUNT: 14.5 % (ref 11.5–14.5)
GLUCOSE SERPL-MCNC: 98 MG/DL (ref 65–100)
HCT VFR BLD AUTO: 38.6 % (ref 35–47)
HGB BLD-MCNC: 12.4 G/DL (ref 11.5–16)
MCH RBC QN AUTO: 26.4 PG (ref 26–34)
MCHC RBC AUTO-ENTMCNC: 32.1 G/DL (ref 30–36.5)
MCV RBC AUTO: 82.3 FL (ref 80–99)
NRBC # BLD: 0 K/UL (ref 0–0.01)
NRBC BLD-RTO: 0 PER 100 WBC
PLATELET # BLD AUTO: 246 K/UL (ref 150–400)
PMV BLD AUTO: 10.2 FL (ref 8.9–12.9)
POTASSIUM SERPL-SCNC: 4.8 MMOL/L (ref 3.5–5.1)
RBC # BLD AUTO: 4.69 M/UL (ref 3.8–5.2)
SODIUM SERPL-SCNC: 141 MMOL/L (ref 136–145)
WBC # BLD AUTO: 6.1 K/UL (ref 3.6–11)

## 2023-07-05 PROCEDURE — 93005 ELECTROCARDIOGRAM TRACING: CPT

## 2023-07-05 PROCEDURE — 71046 X-RAY EXAM CHEST 2 VIEWS: CPT

## 2023-07-05 PROCEDURE — 80048 BASIC METABOLIC PNL TOTAL CA: CPT

## 2023-07-05 PROCEDURE — 85027 COMPLETE CBC AUTOMATED: CPT

## 2023-07-06 LAB
BACTERIA SPEC CULT: NORMAL
BACTERIA SPEC CULT: NORMAL
EKG ATRIAL RATE: 69 BPM
EKG DIAGNOSIS: NORMAL
EKG P AXIS: 46 DEGREES
EKG P-R INTERVAL: 188 MS
EKG Q-T INTERVAL: 388 MS
EKG QRS DURATION: 90 MS
EKG QTC CALCULATION (BAZETT): 415 MS
EKG R AXIS: -2 DEGREES
EKG T AXIS: 46 DEGREES
EKG VENTRICULAR RATE: 69 BPM
Lab: NORMAL

## 2023-07-17 ENCOUNTER — TELEPHONE (OUTPATIENT)
Facility: CLINIC | Age: 78
End: 2023-07-17

## 2023-07-17 ENCOUNTER — OFFICE VISIT (OUTPATIENT)
Facility: CLINIC | Age: 78
End: 2023-07-17
Payer: MEDICARE

## 2023-07-17 VITALS
HEART RATE: 78 BPM | DIASTOLIC BLOOD PRESSURE: 62 MMHG | WEIGHT: 171.2 LBS | TEMPERATURE: 97.3 F | BODY MASS INDEX: 28.52 KG/M2 | SYSTOLIC BLOOD PRESSURE: 136 MMHG | OXYGEN SATURATION: 98 % | HEIGHT: 65 IN

## 2023-07-17 DIAGNOSIS — Z01.818 PRE-OPERATIVE CLEARANCE: Primary | ICD-10-CM

## 2023-07-17 DIAGNOSIS — M17.11 OSTEOARTHRITIS OF RIGHT KNEE, UNSPECIFIED OSTEOARTHRITIS TYPE: ICD-10-CM

## 2023-07-17 PROCEDURE — 99214 OFFICE O/P EST MOD 30 MIN: CPT | Performed by: STUDENT IN AN ORGANIZED HEALTH CARE EDUCATION/TRAINING PROGRAM

## 2023-07-17 PROCEDURE — G8427 DOCREV CUR MEDS BY ELIG CLIN: HCPCS | Performed by: STUDENT IN AN ORGANIZED HEALTH CARE EDUCATION/TRAINING PROGRAM

## 2023-07-17 PROCEDURE — 1123F ACP DISCUSS/DSCN MKR DOCD: CPT | Performed by: STUDENT IN AN ORGANIZED HEALTH CARE EDUCATION/TRAINING PROGRAM

## 2023-07-17 PROCEDURE — 1036F TOBACCO NON-USER: CPT | Performed by: STUDENT IN AN ORGANIZED HEALTH CARE EDUCATION/TRAINING PROGRAM

## 2023-07-17 PROCEDURE — G8399 PT W/DXA RESULTS DOCUMENT: HCPCS | Performed by: STUDENT IN AN ORGANIZED HEALTH CARE EDUCATION/TRAINING PROGRAM

## 2023-07-17 PROCEDURE — G8419 CALC BMI OUT NRM PARAM NOF/U: HCPCS | Performed by: STUDENT IN AN ORGANIZED HEALTH CARE EDUCATION/TRAINING PROGRAM

## 2023-07-17 PROCEDURE — 1090F PRES/ABSN URINE INCON ASSESS: CPT | Performed by: STUDENT IN AN ORGANIZED HEALTH CARE EDUCATION/TRAINING PROGRAM

## 2023-07-17 RX ORDER — PSYLLIUM SEED (WITH DEXTROSE)
POWDER (GRAM) ORAL DAILY
COMMUNITY

## 2023-07-17 RX ORDER — IBUPROFEN 200 MG
200 TABLET ORAL EVERY 6 HOURS PRN
COMMUNITY

## 2023-07-17 SDOH — ECONOMIC STABILITY: FOOD INSECURITY: WITHIN THE PAST 12 MONTHS, YOU WORRIED THAT YOUR FOOD WOULD RUN OUT BEFORE YOU GOT MONEY TO BUY MORE.: NEVER TRUE

## 2023-07-17 SDOH — ECONOMIC STABILITY: HOUSING INSECURITY
IN THE LAST 12 MONTHS, WAS THERE A TIME WHEN YOU DID NOT HAVE A STEADY PLACE TO SLEEP OR SLEPT IN A SHELTER (INCLUDING NOW)?: NO

## 2023-07-17 SDOH — ECONOMIC STABILITY: FOOD INSECURITY: WITHIN THE PAST 12 MONTHS, THE FOOD YOU BOUGHT JUST DIDN'T LAST AND YOU DIDN'T HAVE MONEY TO GET MORE.: NEVER TRUE

## 2023-07-17 SDOH — ECONOMIC STABILITY: INCOME INSECURITY: HOW HARD IS IT FOR YOU TO PAY FOR THE VERY BASICS LIKE FOOD, HOUSING, MEDICAL CARE, AND HEATING?: NOT HARD AT ALL

## 2023-07-17 ASSESSMENT — ENCOUNTER SYMPTOMS
FACIAL SWELLING: 0
DIARRHEA: 0
CONSTIPATION: 0
SHORTNESS OF BREATH: 0
SORE THROAT: 0
COUGH: 0
ABDOMINAL PAIN: 0

## 2023-07-17 NOTE — TELEPHONE ENCOUNTER
Felix Salguero can you fax my office note regarding clearance to [phone], Dr. Oliver Ramirez office. Thanks.

## 2023-08-04 DIAGNOSIS — Z96.651 STATUS POST TOTAL RIGHT KNEE REPLACEMENT: Primary | ICD-10-CM

## 2023-08-10 ENCOUNTER — OFFICE VISIT (OUTPATIENT)
Age: 78
End: 2023-08-10

## 2023-08-10 DIAGNOSIS — M17.11 OSTEOARTHRITIS OF RIGHT KNEE, UNSPECIFIED OSTEOARTHRITIS TYPE: Primary | ICD-10-CM

## 2023-08-10 RX ORDER — ONDANSETRON 8 MG/1
8 TABLET, ORALLY DISINTEGRATING ORAL EVERY 8 HOURS PRN
Qty: 20 TABLET | Refills: 0 | Status: SHIPPED | OUTPATIENT
Start: 2023-08-10 | End: 2023-08-17

## 2023-08-10 RX ORDER — CEPHALEXIN 500 MG/1
500 CAPSULE ORAL EVERY 8 HOURS
Qty: 9 CAPSULE | Refills: 0 | Status: SHIPPED | OUTPATIENT
Start: 2023-08-10 | End: 2023-08-13

## 2023-08-10 RX ORDER — OXYCODONE HYDROCHLORIDE AND ACETAMINOPHEN 5; 325 MG/1; MG/1
1 TABLET ORAL
Qty: 30 TABLET | Refills: 0 | Status: SHIPPED | OUTPATIENT
Start: 2023-08-10 | End: 2023-08-18

## 2023-08-10 NOTE — PROGRESS NOTES
Name: Jai Lin    : 1945     [facility]  [facility]  [address], [address] [address]  Dept: [phone]  Dept Fax: [phone]     Chief Complaint   Patient presents with    Pre-op Exam    Knee Pain        There were no vitals taken for this visit. No Known Allergies     Current Outpatient Medications   Medication Sig Dispense Refill    NONFORMULARY 1 capsule daily SUPER SUPPLEMENT FROM [employer]URE SUNSHINE      ibuprofen (ADVIL;MOTRIN) 200 MG tablet Take 1 tablet by mouth every 6 hours as needed for Pain      Psyllium (METAMUCIL) 48.57 % POWD Take by mouth daily      Calcium Carbonate-Vitamin D 600-3. 125 MG-MCG TABS Take 600 mg by mouth daily      fluticasone (FLONASE) 50 MCG/ACT nasal spray 2 sprays by Nasal route as needed      montelukast (SINGULAIR) 10 MG tablet Take 1 tablet by mouth as needed       No current facility-administered medications for this visit. Patient Active Problem List   Diagnosis    Diverticulosis    Abnormal breast thermography    Onychomycosis    Spigelian hernia with bowel obstruction    Hyperlipidemia      Family History   Problem Relation Age of Onset    Heart Disease Father     Cancer Father         Brain tumor    Cancer Mother         Tumor behind eye       Past Surgical History:   Procedure Laterality Date    BREAST BIOPSY Left 10/28/2020    Left Breast Wire Localized Excisional Biopsy performed by Joyce Lancaster MD at [address] [facility]      COLONOSCOPY      GYN      hysterectomy    HERNIA REPAIR      HERNIA REPAIR      HERNIA REPAIR  2021    ORTHOPEDIC SURGERY      knee      Past Medical History:   Diagnosis Date    Arthritis     Thyroid disease         I have reviewed and agree with [address] and ROLAND and intake form in chart and the record furthermore I have reviewed prior medical record(s) regarding this patients care during this appointment.      Review of

## 2023-08-17 ENCOUNTER — HOSPITAL ENCOUNTER (OUTPATIENT)
Facility: HOSPITAL | Age: 78
Setting detail: RECURRING SERIES
Discharge: HOME | End: 2023-08-20
Payer: MEDICARE

## 2023-08-17 PROCEDURE — 97110 THERAPEUTIC EXERCISES: CPT | Performed by: PHYSICAL THERAPIST

## 2023-08-17 PROCEDURE — 97162 PT EVAL MOD COMPLEX 30 MIN: CPT | Performed by: PHYSICAL THERAPIST

## 2023-08-17 NOTE — THERAPY EVALUATION
[address] [place] [address], [address] [place] [address], [address] [facility]  Ph: [phone]     Fax: [phone]       PHYSICAL THERAPY - MEDICARE EVALUATION/PLAN OF CARE NOTE (updated 3/23)      Date: 2023          Patient Name:  Abdias Vivar :  1945   Medical   Diagnosis:  Pain in right knee [M25.561]  Presence of right artificial knee joint [Z96.651] Treatment Diagnosis:  M25.561  RIGHT KNEE PAIN    Referral Source:  Ata Hilliard MD Provider #:  [id number]                Insurance: Payor: MEDICARE / Plan: MEDICARE PART A AND B / Product Type: *No Product type* /      Patient  verified yes     Visit #   Current  / Total 1 18   Time   In / Out 150 255   Total Treatment Time 65   Total Timed Codes 25   1:1 Treatment Time 54      [facility] Totals Reminder:  bill using total billable   min of TIMED therapeutic procedures and modalities. 8-22 min = 1 unit; 23-37 min = 2 units; 38-52 min = 3 units;  53-67 min = 4 units; 68-82 min = 5 units           SUBJECTIVE  Pain Level (0-10 scale): 7-8/10 R knee currently, 5/10 current, 10/10 worst  [x]constant []intermittent []improving []worsening []no change since onset    Any medication changes, allergies to medications, adverse drug reactions, diagnosis change, or new procedure performed?: [x] No    [] Yes (see summary sheet for update)  Medications: Verified on Patient Summary List    Subjective functional status/changes:     Patient reports R TKR yesterday due to \"many years of pain\". Scheduled to have L TKR in the fall.  and son present and engaged throughout evaluation.    Start of Care: 2023  Onset Date: 2023  Current symptoms/Complaints: R knee   Mechanism of Injury: s/p TKR  PLOF: ambulated I without AD, did hold 's arm occasionally in community  Limitations to PLOF/Activity or Recreational Limitations: ambulating with RW slower than desired, slower with all mobility  Work Hx: retired, volunteer

## 2023-08-21 ENCOUNTER — HOSPITAL ENCOUNTER (OUTPATIENT)
Facility: HOSPITAL | Age: 78
Setting detail: RECURRING SERIES
Discharge: HOME | End: 2023-08-24
Payer: MEDICARE

## 2023-08-21 PROCEDURE — 97110 THERAPEUTIC EXERCISES: CPT | Performed by: PHYSICAL THERAPIST

## 2023-08-21 NOTE — PROGRESS NOTES
applicable)     Details if applicable:  patellar mobs                  45      Total Total         Modalities Rationale:     decrease pain to improve patient's ability to progress to PLOF and address remaining functional goals. min [] Estim Unattended,             type/location:       []  w/ice    []  w/heat        min [] Estim Attended,             type/location:       []  w/ice   []  w/heat         []  w/US   []  TENS insruct            min []  Mechanical Traction,        type/lbs:        []  pro      []  sup           []  int       []  cont            []  before manual           []  after manual     min []  Ultrasound,         settings/location:     10 min  unbilled [x]  Ice     []  Heat            location/position: To R knee with knee extended, LE s elevated on declined wedge        min []  Vasopneumatic Device,      press/temp:   pre-treatment girth :    post-treatment girth :    measured at (landmark       location) : If using vaso (only need to measure limb vaso being performed on)        min []  Other:      Skin assessment post-treatment (if applicable):    [x]  intact    []  redness- no adverse reaction                 []redness - adverse reaction:          [x]  Patient Education billed concurrently with other procedures   [x] Review HEP    [] Progressed/Changed HEP, detail:    [] Other detail:         Other Objective/Functional Measures  Initiated exercises in the gym. PÉREZ stocking in place R knee, as well as post op dressing. No drainage noted. Pain Level at end of session (0-10 scale): 2/10      Assessment   Patient tolerated session well, including progression of exercises to include standing exercises. Improved TKE at heel strike noted with gait mechanics RLE.   Pt will benefit from skilled services to improve performance with joint stabilization and mechanics, knee flexibility and strength, neuromuscular activation and awareness of the LE for joint protection, and to address

## 2023-08-23 ENCOUNTER — TELEMEDICINE (OUTPATIENT)
Age: 78
End: 2023-08-23

## 2023-08-23 DIAGNOSIS — Z96.651 STATUS POST TOTAL RIGHT KNEE REPLACEMENT: Primary | ICD-10-CM

## 2023-08-23 DIAGNOSIS — M25.561 RIGHT KNEE PAIN, UNSPECIFIED CHRONICITY: ICD-10-CM

## 2023-08-23 PROCEDURE — 99024 POSTOP FOLLOW-UP VISIT: CPT

## 2023-08-23 NOTE — PATIENT INSTRUCTIONS
Post Operative Total Knee Replacement Instructions    PLEASE REMOVE YOUR LONG WHITE BANDAGE & STOCKING PRIOR TO CONNECTING TO YOUR APPOINTMENT       During your recovery from a total knee replacement, you will be participating in an OUTPATIENT physical therapy program. Your goal is to progress from a walker to a cane to nothing at all while walking, if possible, over the next 2 weeks. You can now shower and get your incision wet, pat it dry afterwards. No further dressing changes will be required as long as there is no drainage. You may take a bath 3 weeks post surgery as long as there is no drainage from your incision. You will see a clear surgical mesh tape over the incision. That mesh tape should come off in the next 2 weeks if it does not you may rub Vaseline over it to help break up the glue and remove it with ease. You may drive if you are not using any assistive devices to walk and are not using any narcotic pain medication. You may discontinue your aspirin (if that is your primary blood thinner prescribed by Dr. Jamil Deleon ) when you are at least 2 weeks out from surgery and are no longer using a cane or walker. If you are still using assistive devices, please DO NOT stop the aspirin until you are completely off them. If you are on other blood thinners prescribed by another doctor please continue that until you are instructed to discontinue them. You and your physical therapist will determine when to stop your physical therapy program.    If you require a refill on a narcotic pain medication, please let Dr. Jamil Deleon or his Nurse Practitioner know at your appointment today or AT LEAST 48 hours prior to needing it. No refills will be provided after hours or during weekends. If you experience any significant calf pain, swelling, or shortness of breath, please call our office immediately or go to the nearest emergency room.     If you notice any significant increase in swelling of the knee or leg,

## 2023-08-28 ENCOUNTER — HOSPITAL ENCOUNTER (OUTPATIENT)
Facility: HOSPITAL | Age: 78
Setting detail: RECURRING SERIES
Discharge: HOME | End: 2023-08-31
Payer: MEDICARE

## 2023-08-28 PROCEDURE — 97110 THERAPEUTIC EXERCISES: CPT

## 2023-08-28 PROCEDURE — 97116 GAIT TRAINING THERAPY: CPT

## 2023-08-28 NOTE — PROGRESS NOTES
PHYSICAL THERAPY - MEDICARE DAILY TREATMENT NOTE (updated 3/23)      Date: 2023          Patient Name:  Tal Santizo :  1945   Medical   Diagnosis:  Pain in right knee [M25.561]  Presence of right artificial knee joint [Z96.651] Treatment Diagnosis:  M25.561  RIGHT KNEE PAIN    Referral Source:  Ehsan Shaw MD Insurance:   [facility] / Plan: MEDICARE PART A AND B / Product Type: *No Product type* /                     Patient  verified yes     Visit #   Current  / Total 2 18   Time   In / Out 1:06pm 1:48pm   Total Treatment Time 42   Total Timed Codes 42   1:1 Treatment Time 42      [facility] Totals Reminder:  bill using total billable   min of TIMED therapeutic procedures and modalities. 8-22 min = 1 unit; 23-37 min = 2 units; 38-52 min = 3 units; 53-67 min = 4 units; 68-82 min = 5 units            SUBJECTIVE    Pain Level (0-10 scale): 2/10    Any medication changes, allergies to medications, adverse drug reactions, diagnosis change, or new procedure performed?: [x] No    [] Yes (see summary sheet for update)  Medications: Verified on Patient Summary List    Subjective functional status/changes:    Pt doing her HEP and feeling like she is making good progress. \"I just can't get comfortable at night to sleep\". OBJECTIVE      Therapeutic Procedures: Tx Min Billable or 1:1 Min (if diff from Tx Min) Procedure, Rationale, Specifics   29  47376 Therapeutic Exercise (timed):  increase ROM, strength, coordination, balance, and proprioception to improve patient's ability to progress to PLOF and address remaining functional goals. (see flow sheet as applicable)     Details if applicable:     3  08285 Manual Therapy (timed):  increase ROM to improve patient's ability to progress to PLOF and address remaining functional goals. The manual therapy interventions were performed at a separate and distinct time from the therapeutic activities interventions .  (see flow sheet as applicable)

## 2023-08-30 ENCOUNTER — HOSPITAL ENCOUNTER (OUTPATIENT)
Facility: HOSPITAL | Age: 78
Setting detail: RECURRING SERIES
Discharge: HOME | End: 2023-09-02
Payer: MEDICARE

## 2023-08-30 PROCEDURE — 97110 THERAPEUTIC EXERCISES: CPT

## 2023-08-30 NOTE — PROGRESS NOTES
night     Pt will verbalize the reasoning for and use proper sleeping techniques for pain/contracture prevention. [] Met [] Not met [] Partially me  Date:     Patient will demonstrate improved heel strike and TKE at initial contact during gait with LRAD to complete 6 minute walk test to demonstrate improved mobility. [] Met [] Not met [] Partially met  Date:      Long Term Goals: To be accomplished in 18 treatments  Pt will have improved AMPAC score by 5-10%. [] Met [] Not met [] Partially met Date:      Pt can ambulate community distances with LRAD and without knee pain/instability to get groceries. [] Met [] Not met [] Partially met Date:      Pt will complete TUG in < 25 seconds with LRAD to demonstrate improved safety and decreased fall risk. [] Met [] Not met [] Partially met  Date:       Pt will have quad strength to perform SLS with minimal support of 1 UE for 5-10 seconds for safe stance phase of gait without buckling.       [] Met [] Not met [] Partially met Date:       Frequency / Duration: Patient to be seen 3 times per week for 18 treatments    PLAN  Yes  Continue plan of care  Re-Cert Due: 30/44/2726  [x]  Upgrade activities as tolerated  []  Discharge due to :  []  Other:      Armida Cabrera PTA       8/30/2023       10:48 AM

## 2023-09-01 ENCOUNTER — HOSPITAL ENCOUNTER (OUTPATIENT)
Facility: HOSPITAL | Age: 78
Setting detail: RECURRING SERIES
Discharge: HOME | End: 2023-09-04
Payer: MEDICARE

## 2023-09-01 PROCEDURE — 97110 THERAPEUTIC EXERCISES: CPT | Performed by: PHYSICAL THERAPIST

## 2023-09-01 NOTE — PROGRESS NOTES
Last Office Visit  -  10/26/21  Next Office Visit  -  n/a    Last Filled  -  10/21/21  Last UDS -  n/a  Contract -  n/a on the VAS. [] Met [] Not met [x] Partially met  Date: 8/28/23--still pain at night     Pt will verbalize the reasoning for and use proper sleeping techniques for pain/contracture prevention. [] Met [] Not met [] Partially me  Date:     Patient will demonstrate improved heel strike and TKE at initial contact during gait with LRAD to complete 6 minute walk test to demonstrate improved mobility. [] Met [] Not met [] Partially met  Date: 9/1 ambulated with SBQC x (4 touo=736 feet) 5 minutes, 15 seconds     Long Term Goals: To be accomplished in 18 treatments  Pt will have improved AMPAC score by 5-10%. [] Met [] Not met [] Partially met Date:      Pt can ambulate community distances with LRAD and without knee pain/instability to get groceries. [] Met [] Not met [] Partially met Date:      Pt will complete TUG in < 25 seconds with LRAD to demonstrate improved safety and decreased fall risk. [] Met [] Not met [] Partially met  Date:       Pt will have quad strength to perform SLS with minimal support of 1 UE for 5-10 seconds for safe stance phase of gait without buckling.       [] Met [] Not met [] Partially met Date:       Frequency / Duration: Patient to be seen 3 times per week for 18 treatments    PLAN  Yes  Continue plan of care  Re-Cert Due: 87/49/7564  [x]  Upgrade activities as tolerated  []  Discharge due to :  []  Other:      Belkys Nightingale       9/1/2023       10:51 AM

## 2023-09-05 ENCOUNTER — HOSPITAL ENCOUNTER (OUTPATIENT)
Facility: HOSPITAL | Age: 78
Setting detail: RECURRING SERIES
Discharge: HOME | End: 2023-09-08
Payer: MEDICARE

## 2023-09-05 PROCEDURE — 97110 THERAPEUTIC EXERCISES: CPT | Performed by: PHYSICAL THERAPIST

## 2023-09-05 NOTE — PROGRESS NOTES
without pulling scar   2  00396 Gait Training (timed): To improve safety and dynamic movement with household/community ambulation. (see flow sheet as applicable)    Details if applicable:  Gait without AD x 2 laps with close S and verbal cues for TKE at heel strike, used SBQC throughout clinic between stations I          42      Total Total       Pt requested to ice at home      [x]  Patient Education billed concurrently with other procedures   [x] Review HEP    [] Progressed/Changed HEP, detail:    [] Other detail:         Other Objective/Functional Measures    Continued with POC as per flow sheet. PROM extension in supine with ankle propped, PROM flexion in prone. Supine AROM knee flexion 100*, extension -5*    Continues with large pocket of swelling noted on medial aspect of R knee, with patient reporting this has been there since bandage was removed. More TTP and area of discomfort today. Will monitor. Pain Level at end of session (0-10 scale): 2/10, \"same\"    Assessment   Pt tolerated session well with no exacerbation of symptoms. Demonstrating improving ROM and gait. Pt requested to ice at home- educated pt on  heel prop with ice to promote extension. Plan to continue to progress ROM and strength per POC as tolerated. Patient will continue to benefit from skilled PT / OT services to modify and progress therapeutic interventions, analyze and address functional mobility deficits, analyze and address ROM deficits, analyze and address strength deficits, analyze and address soft tissue restrictions, and analyze and address imbalance/dizziness to address functional deficits and attain remaining goals. Progress toward goals / Updated goals:  []  See Progress Note/Recertification    Short Term Goals: To be accomplished in 9 treatments  Pt will be independent with HEP to promote progression of therapy services.       [x] Met [] Not met [] Partially met  Date: 8/28/23     Pt will use ice/heat/massage to

## 2023-09-06 ENCOUNTER — HOSPITAL ENCOUNTER (OUTPATIENT)
Facility: HOSPITAL | Age: 78
Setting detail: RECURRING SERIES
Discharge: HOME | End: 2023-09-09
Payer: MEDICARE

## 2023-09-06 PROCEDURE — 97110 THERAPEUTIC EXERCISES: CPT | Performed by: PHYSICAL THERAPIST

## 2023-09-06 NOTE — PROGRESS NOTES
PHYSICAL THERAPY - MEDICARE DAILY TREATMENT NOTE (updated 3/23)      Date: 2023          Patient Name:  Ruperto Mansfield :  1945   Medical   Diagnosis:  Pain in right knee [M25.561]  Presence of right artificial knee joint [Z96.651] Treatment Diagnosis:  M25.561  RIGHT KNEE PAIN    Referral Source:  Carroll Villa MD Insurance:   Payor: [employer] / Plan: MEDICARE PART A AND B / Product Type: *No Product type* /                     Patient  verified yes     Visit #   Current  / Total 7 18   Time   In / Out 10:45 am 11:35 am   Total Treatment Time 50 minutes   Total Timed Codes 50 minutes   1:1 Treatment Time 50 minutes      [facility] Totals Reminder:  bill using total billable   min of TIMED therapeutic procedures and modalities. 8-22 min = 1 unit; 23-37 min = 2 units; 38-52 min = 3 units; 53-67 min = 4 units; 68-82 min = 5 units            SUBJECTIVE    Pain Level (0-10 scale): 0/10, \"just sore\"    Any medication changes, allergies to medications, adverse drug reactions, diagnosis change, or new procedure performed?: [x] No    [] Yes (see summary sheet for update)  Medications: Verified on Patient Summary List    Subjective functional status/changes:    Pt reports her knee was very sore after treatment yesterday, states she had to ice numerous times. Presents reporting no pain, just \"sore\". OBJECTIVE      Therapeutic Procedures: Tx Min Billable or 1:1 Min (if diff from Tx Min) Procedure, Rationale, Specifics   48  87160 Therapeutic Exercise (timed):  increase ROM, strength, coordination, balance, and proprioception to improve patient's ability to progress to PLOF and address remaining functional goals. (see flow sheet as applicable)     Details if applicable:       38325 Manual Therapy (timed):  increase ROM to improve patient's ability to progress to PLOF and address remaining functional goals.   The manual therapy interventions were performed at a separate and distinct time from the

## 2023-09-08 ENCOUNTER — HOSPITAL ENCOUNTER (OUTPATIENT)
Facility: HOSPITAL | Age: 78
Setting detail: RECURRING SERIES
Discharge: HOME | End: 2023-09-11
Payer: MEDICARE

## 2023-09-08 PROCEDURE — 97110 THERAPEUTIC EXERCISES: CPT | Performed by: PHYSICAL THERAPIST

## 2023-09-08 PROCEDURE — 97140 MANUAL THERAPY 1/> REGIONS: CPT | Performed by: PHYSICAL THERAPIST

## 2023-09-08 NOTE — PROGRESS NOTES
PHYSICAL THERAPY - MEDICARE DAILY TREATMENT NOTE (updated 3/23)      Date: 2023          Patient Name:  Anuradha Lind :  1945   Medical   Diagnosis:  Pain in right knee [M25.561]  Presence of right artificial knee joint [Z96.651] Treatment Diagnosis:  M25.561  RIGHT KNEE PAIN    Referral Source:  Jericho Bernabe MD Insurance:   Payor: [facility] / Plan: MEDICARE PART A AND B / Product Type: *No Product type* /                     Patient  verified yes     Visit #   Current  / Total 8 18   Time   In / Out 10:42 am 11:40 am   Total Treatment Time 58 minutes   Total Timed Codes 53 minutes   1:1 Treatment Time 53 minutes      [facility] Totals Reminder:  bill using total billable   min of TIMED therapeutic procedures and modalities. 8-22 min = 1 unit; 23-37 min = 2 units; 38-52 min = 3 units; 53-67 min = 4 units; 68-82 min = 5 units            SUBJECTIVE    Pain Level (0-10 scale): 0/10, \"just stiff\"    Any medication changes, allergies to medications, adverse drug reactions, diagnosis change, or new procedure performed?: [x] No    [] Yes (see summary sheet for update)  Medications: Verified on Patient Summary List    Subjective functional status/changes:    Pt reports her knee was sore again after last treatment but just stiff today. Reports steristrip fell off incision yesterday and there is no drainage. '      OBJECTIVE      Therapeutic Procedures: Tx Min Billable or 1:1 Min (if diff from Tx Min) Procedure, Rationale, Specifics   51   46 13287 Therapeutic Exercise (timed):  increase ROM, strength, coordination, balance, and proprioception to improve patient's ability to progress to PLOF and address remaining functional goals. (see flow sheet as applicable)     Details if applicable:     5 9 46127 Manual Therapy (timed):  increase ROM to improve patient's ability to progress to PLOF and address remaining functional goals.   The manual therapy interventions were performed at a separate and distinct

## 2023-09-11 ENCOUNTER — HOSPITAL ENCOUNTER (OUTPATIENT)
Facility: HOSPITAL | Age: 78
Setting detail: RECURRING SERIES
Discharge: HOME | End: 2023-09-14
Payer: MEDICARE

## 2023-09-11 PROCEDURE — 97116 GAIT TRAINING THERAPY: CPT

## 2023-09-11 PROCEDURE — 97110 THERAPEUTIC EXERCISES: CPT

## 2023-09-11 NOTE — PROGRESS NOTES
PHYSICAL THERAPY - MEDICARE DAILY TREATMENT NOTE (updated 3/23)      Date: 2023          Patient Name:  Niranjan Garcia :  1945   Medical   Diagnosis:  Pain in right knee [M25.561]  Presence of right artificial knee joint [Z96.651] Treatment Diagnosis:  M25.561  RIGHT KNEE PAIN    Referral Source:  Chinedu Corona MD Insurance:   Payor: Brie Kohut / Plan: MEDICARE PART A AND B / Product Type: *No Product type* /                     Patient  verified yes     Visit #   Current  / Total 9 18   Time   In / Out 10:37 am 11:40am   Total Treatment Time 60   Total Timed Codes 60   1:1 Treatment Time 60      [facility] Totals Reminder:  bill using total billable   min of TIMED therapeutic procedures and modalities. 8-22 min = 1 unit; 23-37 min = 2 units; 38-52 min = 3 units; 53-67 min = 4 units; 68-82 min = 5 units            SUBJECTIVE    Pain Level (0-10 scale): 0/10    Any medication changes, allergies to medications, adverse drug reactions, diagnosis change, or new procedure performed?: [x] No    [] Yes (see summary sheet for update)  Medications: Verified on Patient Summary List    Subjective functional status/changes:    Pt has no pain. She is using her RW in the clinic today. She reports that she is now \"sleeping so much better at night. The pain has eased and it's mainly just stiff\". OBJECTIVE      Therapeutic Procedures: Tx Min Billable or 1:1 Min (if diff from Tx Min) Procedure, Rationale, Specifics   48    72195 Therapeutic Exercise (timed):  increase ROM, strength, coordination, balance, and proprioception to improve patient's ability to progress to PLOF and address remaining functional goals. (see flow sheet as applicable)     Details if applicable:       60982 Manual Therapy (timed):  increase ROM to improve patient's ability to progress to PLOF and address remaining functional goals.   The manual therapy interventions were performed at a separate and distinct time from the therapeutic

## 2023-09-13 ENCOUNTER — HOSPITAL ENCOUNTER (OUTPATIENT)
Facility: HOSPITAL | Age: 78
Setting detail: RECURRING SERIES
Discharge: HOME | End: 2023-09-16
Payer: MEDICARE

## 2023-09-13 PROCEDURE — 97110 THERAPEUTIC EXERCISES: CPT | Performed by: PHYSICAL THERAPIST

## 2023-09-13 NOTE — PROGRESS NOTES
PHYSICAL THERAPY - MEDICARE DAILY TREATMENT NOTE (updated 3/23)      Date: 2023          Patient Name:  Karin Acevedo :  1945   Medical   Diagnosis:  Pain in right knee [M25.561]  Presence of right artificial knee joint [Z96.651] Treatment Diagnosis:  M25.561  RIGHT KNEE PAIN    Referral Source:  Walter Mcclellan MD Insurance:   Payor: [facility] / Plan: MEDICARE PART A AND B / Product Type: *No Product type* /                     Patient  verified yes     Visit #   Current  / Total 10 18   Time   In / Out 10:45 am 11:45am   Total Treatment Time 60   Total Timed Codes 60   1:1 Treatment Time 60      [facility] Totals Reminder:  bill using total billable   min of TIMED therapeutic procedures and modalities. 8-22 min = 1 unit; 23-37 min = 2 units; 38-52 min = 3 units; 53-67 min = 4 units; 68-82 min = 5 units            SUBJECTIVE    Pain Level (0-10 scale): 0/10 \"just stiff\"    Any medication changes, allergies to medications, adverse drug reactions, diagnosis change, or new procedure performed?: [x] No    [] Yes (see summary sheet for update)  Medications: Verified on Patient Summary List    Subjective functional status/changes:    Pt has no pain. She is using her RW in the clinic today. She reports that she is now \"sleeping so much better at night. The pain has eased and it's mainly just stiff\". OBJECTIVE      Therapeutic Procedures: Tx Min Billable or 1:1 Min (if diff from Tx Min) Procedure, Rationale, Specifics   60    44668 Therapeutic Exercise (timed):  increase ROM, strength, coordination, balance, and proprioception to improve patient's ability to progress to PLOF and address remaining functional goals. (see flow sheet as applicable)     Details if applicable:       24128 Manual Therapy (timed):  increase ROM to improve patient's ability to progress to PLOF and address remaining functional goals.   The manual therapy interventions were performed at a separate and distinct time from the
(TUG) TEST     Score:  Initial:  39.91 sec Asst Device:  RW     Follow up date:      9/13  16.1 seconds Asst Device:  SPC      A score of >/= 14 seconds has been shown to indicate high risk of falls. Interpretation of Score: The test measures, in seconds, the time taken by an individual to stand up from a standard arm chair, walk a distance of 3 meters (approx 10 ft), turn, walk back to the chair and sit down. The test time is strongly correlated to the level of functional mobility, i.e. more time taken indicates more dependent in activities of daily living. Predictive Results      Seconds            Rating  <10                        Freely mobile  <20                        Mostly independent  20-19         Variable mobility  >20                        Impaired mobility  Age-Matched Norms:  Age in Years  Mean (sec)    60-69  7.9 +/- 0.9    70-79   7.7 +/- 2.3     80-89  11.0 +/- 2.2 No device   19.9 +/- 6.4 With device      14.7 +/- 7.9 No device   19.0 +/- 2.5 With device   Irina Mayer. The Time \"up and go\": A test of Basic Functional Mobility for Frail Elderly Persons. Journal of [facility] [address]; [phone] [place] Frank[place] M. Predicting the Probability for Falls in Community Dwelling Older Adults Using the Timed Up and Go Test, Physical Therapy 2000, 80 (9): [id number]. Ofe MM, Marsha GL, Ravinder Haynes. Functional Performance in Community Living Older Adults, Journal of Geriatric Physical Therapy 2003; 26(3): 14-22. [address] [place] [address], Falls Screening and Referall Algorithm, TUG, [facility], June, 2005                    Objective/Functional Outcome Measure: Basic Mobility  AM-PAC: Initial 43.72%  9/13  29.36%  AM-PAC score = an established functional score where 0% = no disability     Progress toward goals / Updated goals:  []  See Progress Note/Recertification    Short Term Goals:  To be

## 2023-09-15 ENCOUNTER — HOSPITAL ENCOUNTER (OUTPATIENT)
Facility: HOSPITAL | Age: 78
Setting detail: RECURRING SERIES
Discharge: HOME | End: 2023-09-18
Payer: MEDICARE

## 2023-09-15 PROCEDURE — 97116 GAIT TRAINING THERAPY: CPT | Performed by: PHYSICAL THERAPIST

## 2023-09-15 PROCEDURE — 97110 THERAPEUTIC EXERCISES: CPT | Performed by: PHYSICAL THERAPIST

## 2023-09-15 NOTE — PROGRESS NOTES
Note/Recertification    Short Term Goals: To be accomplished in 9 treatments  Pt will be independent with HEP to promote progression of therapy services. [x] Met [] Not met [] Partially met  Date: 8/28/23     Pt will use ice/heat/massage to assist with inflammation and pain management as instructed and no pain > 4/10 on the VAS. [x] Met [] Not met [] Partially met  Date: 9/11/23-pt told to try heat for 10 min on calf if needed     Pt will verbalize the reasoning for and use proper sleeping techniques for pain/contracture prevention. [x] Met [] Not met [] Partially me  Date: 9/11/23     Patient will demonstrate improved heel strike and TKE at initial contact during gait with LRAD to complete 6 minute walk test to demonstrate improved mobility. [] Met [] Not met [x] Partially met  Date:9/13 improving, still lacks TKE but decreasing AD to [facility] to complete 6 MWT ( 778 feet) 9/1 ambulated with SBQC x (4 rymj=397 feet) 5 minutes, 15 seconds     Long Term Goals: To be accomplished in 18 treatments  Pt will have improved AMPAC score by 5-10%. [x] Met [] Not met [] Partially met Date:9/13      Pt can ambulate community distances with LRAD and without knee pain/instability to get groceries. [] Met [] Not met [x] Partially met Date: 9/11-pt ambulating in parking lot, but still prefers the RW for outside the house. Pt will complete TUG in < 25 seconds with LRAD to demonstrate improved safety and decreased fall risk. [] Met [] Not met [x] Partially met  Date: 9/13 16.1 seconds with SPC     Pt will have quad strength to perform SLS with minimal support of 1 UE for 5-10 seconds for safe stance phase of gait without buckling.       [x] Met [] Not met [] Partially met Date:9/13 > 20 seconds with 1 UE, 2 seconds without UE 9/6 5 seconds with UE support      Frequency / Duration: Patient to be seen 3 times per week for 18 treatments    PLAN  Yes  Continue plan of care  Re-Cert Due:

## 2023-09-18 ENCOUNTER — HOSPITAL ENCOUNTER (OUTPATIENT)
Facility: HOSPITAL | Age: 78
Setting detail: RECURRING SERIES
Discharge: HOME | End: 2023-09-21
Payer: MEDICARE

## 2023-09-18 PROCEDURE — 97110 THERAPEUTIC EXERCISES: CPT

## 2023-09-18 PROCEDURE — 97116 GAIT TRAINING THERAPY: CPT

## 2023-09-18 NOTE — PROGRESS NOTES
PHYSICAL THERAPY - MEDICARE DAILY TREATMENT NOTE (updated 3/23)      Date: 2023          Patient Name:  Melanie Curry :  1945   Medical   Diagnosis:  Pain in right knee [M25.561]  Presence of right artificial knee joint [Z96.651] Treatment Diagnosis:  M25.561  RIGHT KNEE PAIN    Referral Source:  Adalberto Harris MD Insurance:   Payor: [address] [place] [address] Avenue / Plan: MEDICARE PART A AND B / Product Type: *No Product type* /                     Patient  verified yes     Visit #   Current  / Total 12 18   Time   In / Out 1:46pm 2:45pm   Total Treatment Time 55   Total Timed Codes 46   1:1 Treatment Time 55      [facility] Totals Reminder:  bill using total billable   min of TIMED therapeutic procedures and modalities. 8-22 min = 1 unit; 23-37 min = 2 units; 38-52 min = 3 units; 53-67 min = 4 units; 68-82 min = 5 units            SUBJECTIVE    Pain Level (0-10 scale): 0/10     Any medication changes, allergies to medications, adverse drug reactions, diagnosis change, or new procedure performed?: [x] No    [] Yes (see summary sheet for update)  Medications: Verified on Patient Summary List    Subjective functional status/changes:    Pt still reports some stiffness, but has no pain. She was able to go to [facility] and just use her cane. She used the ramp instead of the steps. \"I haven't used the walker since you told me to try going without it\". OBJECTIVE      Therapeutic Procedures: Tx Min Billable or 1:1 Min (if diff from Tx Min) Procedure, Rationale, Specifics   45   53 59184 Therapeutic Exercise (timed):  increase ROM, strength, coordination, balance, and proprioception to improve patient's ability to progress to PLOF and address remaining functional goals.  (see flow sheet as applicable)     Details if applicable:  Pt requested to ride bike  independently at end of session to ease stiffness and try a full rotation on Seat #8     16273 Manual Therapy (timed):  increase ROM to improve patient's ability to progress

## 2023-09-20 ENCOUNTER — HOSPITAL ENCOUNTER (OUTPATIENT)
Facility: HOSPITAL | Age: 78
Setting detail: RECURRING SERIES
Discharge: HOME | End: 2023-09-23
Payer: MEDICARE

## 2023-09-20 PROCEDURE — 97110 THERAPEUTIC EXERCISES: CPT

## 2023-09-20 PROCEDURE — 97116 GAIT TRAINING THERAPY: CPT

## 2023-09-20 NOTE — PROGRESS NOTES
PHYSICAL THERAPY - MEDICARE DAILY TREATMENT NOTE (updated 3/23)      Date: 2023          Patient Name:  Wayne Oglesby :  1945   Medical   Diagnosis:  Pain in right knee [M25.561]  Presence of right artificial knee joint [Z96.651] Treatment Diagnosis:  M25.561  RIGHT KNEE PAIN    Referral Source:  Elkin Conti MD Insurance:   Payor: Caryle Curia / Plan: MEDICARE PART A AND B / Product Type: *No Product type* /                     Patient  verified yes     Visit #   Current  / Total 13 18   Time   In / Out 1:00 pm 01:50 pm   Total Treatment Time 50 min   Total Timed Codes 45 min   1:1 Treatment Time 45 min      [facility] [facility] Totals Reminder:  bill using total billable   min of TIMED therapeutic procedures and modalities. 8-22 min = 1 unit; 23-37 min = 2 units; 38-52 min = 3 units; 53-67 min = 4 units; 68-82 min = 5 units            SUBJECTIVE    Pain Level (0-10 scale): 0/10     Any medication changes, allergies to medications, adverse drug reactions, diagnosis change, or new procedure performed?: [x] No    [] Yes (see summary sheet for update)  Medications: Verified on Patient Summary List    Subjective functional status/changes:    Pt still reports some stiffness, and no pain. Patient stated she does exercises everyday. \"The hardest one is the one where I stand on 1 leg. \" Patient was wearing knee brace on L LE. She also wants to strengthen L LE for surgery next month. OBJECTIVE      Therapeutic Procedures: Tx Min Billable or 1:1 Min (if diff from Tx Min) Procedure, Rationale, Specifics   40   35 [id number] Therapeutic Exercise (timed):  increase ROM, strength, coordination, balance, and proprioception to improve patient's ability to progress to PLOF and address remaining functional goals.  (see flow sheet as applicable)     Details if applicable:  Pt requested to ride bike  independently at end of session to ease stiffness and try a full rotation on Seat #8     45518 Manual Therapy (timed):  increase

## 2023-09-22 ENCOUNTER — HOSPITAL ENCOUNTER (OUTPATIENT)
Facility: HOSPITAL | Age: 78
Setting detail: RECURRING SERIES
Discharge: HOME | End: 2023-09-25
Payer: MEDICARE

## 2023-09-22 PROCEDURE — 97110 THERAPEUTIC EXERCISES: CPT

## 2023-09-22 NOTE — PROGRESS NOTES
separate and distinct time from the therapeutic activities interventions . (see flow sheet as applicable)     Details if applicable:  STM to distal quad, patellar mobs gently without pulling scar   4  00968 Gait Training (timed): To improve safety and dynamic movement with household/community ambulation. (see flow sheet as applicable)    Details if applicable:  Gait on Tdm retro and on blue mat fwd/retro         50     Total Total       Pt uses ice at home      [x]  Patient Education billed concurrently with other procedures continue HEP , also recommended to continue to go for walks to increase endurance  [] Review HEP    [] Progressed/Changed HEP, detail: Added on to HEP with SHR, step ups, HS/Gastroc stretches, Theraband PF  [] Other detail:         Other Objective/Functional Measures  See exercise flow sheet - reviewed previous exercises and gave HO and RTB/GTB for HEP  Added step downs and lunges on step    Pain Level at end of session (0-10 scale): 0/10    Assessment   Pt with good carry over of exercises despite stiffness and swelling . Ambulating in gym without an AD from equipment/exercises . Patient was given HO of theraband exercises with RTB/GTB for HEP. She will ice and elevate at home. No pain post treatment session. Patient will continue to benefit from skilled PT / OT services to modify and progress therapeutic interventions, analyze and address functional mobility deficits, analyze and address ROM deficits, analyze and address strength deficits, analyze and address soft tissue restrictions, and analyze and address imbalance/dizziness to address functional deficits and attain remaining goals. Progress toward goals / Updated goals:  []  See Progress Note/Recertification    Short Term Goals: To be accomplished in 9 treatments  Pt will be independent with HEP to promote progression of therapy services.       [x] Met [] Not met [] Partially met  Date: 8/28/23     Pt will use ice/heat/massage to

## 2023-09-25 ENCOUNTER — HOSPITAL ENCOUNTER (OUTPATIENT)
Facility: HOSPITAL | Age: 78
Setting detail: RECURRING SERIES
Discharge: HOME | End: 2023-09-28
Payer: MEDICARE

## 2023-09-25 PROCEDURE — 97116 GAIT TRAINING THERAPY: CPT | Performed by: PHYSICAL THERAPIST

## 2023-09-25 PROCEDURE — 97110 THERAPEUTIC EXERCISES: CPT | Performed by: PHYSICAL THERAPIST

## 2023-09-25 NOTE — PROGRESS NOTES
[] Met [] Not met [x] Partially met Date: 9/25 using [address] in community, improving without AD with no limp noted today  9/11-pt ambulating in parking lot, but still prefers the RW for outside the house. Pt will complete TUG in < 25 seconds with LRAD to demonstrate improved safety and decreased fall risk. [x] Met [] Not met [] Partially met  Date: 9/25 13.2 seconds without AD   9/13 16.1 seconds with SPC     Pt will have quad strength to perform SLS with minimal support of 1 UE for 5-10 seconds for safe stance phase of gait without buckling.       [x] Met [] Not met [] Partially met Date:9/13 > 20 seconds with 1 UE, 2 seconds without UE 9/6 5 seconds with UE support      Frequency / Duration: Patient to be seen 3 times per week for 18 treatments    PLAN  Yes  Continue plan of care  Re-Cert Due: 15/81/8834  [x]  Upgrade activities as tolerated  []  Discharge due to :  [x]  Other: Continue to advance GT including steps and uneven surfaces      BETTY ANGELES, PT       9/25/2023       11:36 AM

## 2023-09-27 ENCOUNTER — HOSPITAL ENCOUNTER (OUTPATIENT)
Facility: HOSPITAL | Age: 78
Setting detail: RECURRING SERIES
Discharge: HOME | End: 2023-09-30
Payer: MEDICARE

## 2023-09-27 PROCEDURE — 97110 THERAPEUTIC EXERCISES: CPT

## 2023-09-27 PROCEDURE — 97116 GAIT TRAINING THERAPY: CPT

## 2023-09-27 NOTE — PROGRESS NOTES
(0-10 scale): 0/10 stiffness    Assessment   Pt with good carry over of exercises and no increase in pain or difficulty. Patient did well with NK table today. Still reporting stiffness to posterior R knee. Patient will apply cp at home. No pain post treatment session. Patient will continue to benefit from skilled PT / OT services to modify and progress therapeutic interventions, analyze and address functional mobility deficits, analyze and address ROM deficits, analyze and address strength deficits, analyze and address soft tissue restrictions, and analyze and address imbalance/dizziness to address functional deficits and attain remaining goals. Progress toward goals / Updated goals:  []  See Progress Note/Recertification    Short Term Goals: To be accomplished in 9 treatments  Pt will be independent with HEP to promote progression of therapy services. [x] Met [] Not met [] Partially met  Date: 8/28/23     Pt will use ice/heat/massage to assist with inflammation and pain management as instructed and no pain > 4/10 on the VAS. [x] Met [] Not met [] Partially met  Date: 9/11/23-pt told to try heat for 10 min on calf if needed     Pt will verbalize the reasoning for and use proper sleeping techniques for pain/contracture prevention. [x] Met [] Not met [] Partially me  Date: 9/11/23     Patient will demonstrate improved heel strike and TKE at initial contact during gait with LRAD to complete 6 minute walk test to demonstrate improved mobility. [] Met [] Not met [x] Partially met  Date:9/13 improving, still lacks TKE but decreasing AD to [facility] to complete 6 MWT ( 778 feet) 9/1 ambulated with SBQC x (4 vgdc=853 feet) 5 minutes, 15 seconds     Long Term Goals: To be accomplished in 18 treatments  Pt will have improved AMPAC score by 5-10%. [x] Met [] Not met [] Partially met Date:9/13      Pt can ambulate community distances with LRAD and without knee pain/instability to get groceries.

## 2023-09-29 ENCOUNTER — HOSPITAL ENCOUNTER (OUTPATIENT)
Facility: HOSPITAL | Age: 78
Setting detail: RECURRING SERIES
End: 2023-09-29
Payer: MEDICARE

## 2023-09-29 PROCEDURE — 97140 MANUAL THERAPY 1/> REGIONS: CPT

## 2023-09-29 PROCEDURE — 97110 THERAPEUTIC EXERCISES: CPT

## 2023-09-29 NOTE — PROGRESS NOTES
PHYSICAL THERAPY - MEDICARE DAILY TREATMENT NOTE (updated 3/23)      Date: 2023          Patient Name:  Caterina Cisneros :  1945   Medical   Diagnosis:  Pain in right knee [M25.561]  Presence of right artificial knee joint [Z96.651] Treatment Diagnosis:  M25.561  RIGHT KNEE PAIN    Referral Source:  Sanju Schafer MD Insurance:   Payor: [address] [place] [address] Avenue / Plan: MEDICARE PART A AND B / Product Type: *No Product type* /                     Patient  verified yes     Visit #   Current  / Total 17 18   Time   In / Out 11:35 am 12:25 pm   Total Treatment Time 50 min   Total Timed Codes 50 min   1:1 Treatment Time 50 min      [facility] Totals Reminder:  bill using total billable   min of TIMED therapeutic procedures and modalities. 8-22 min = 1 unit; 23-37 min = 2 units; 38-52 min = 3 units; 53-67 min = 4 units; 68-82 min = 5 units            SUBJECTIVE    Pain Level (0-10 scale): 0/10 \"stiffness     Any medication changes, allergies to medications, adverse drug reactions, diagnosis change, or new procedure performed?: [x] No    [] Yes (see summary sheet for update)  Medications: Verified on Patient Summary List    Subjective functional status/changes:    Pt  reports continued stiffness on the back of the knee. Patient feels her R ankle is weak due to her ER her R LE. Explained she rotates her leg because of the pain to posterior of the knee and not due to weakness in her ankle. Patient came in without cane but ambulated with cane when she left. OBJECTIVE      Therapeutic Procedures: Tx Min Billable or 1:1 Min (if diff from Tx Min) Procedure, Rationale, Specifics     35  49048 Therapeutic Exercise (timed):  increase ROM, strength, coordination, balance, and proprioception to improve patient's ability to progress to PLOF and address remaining functional goals.  (see flow sheet as applicable)     Details if applicable:     15  26189 Manual Therapy (timed):  increase ROM to improve patient's ability to progress to

## 2023-10-02 ENCOUNTER — APPOINTMENT (OUTPATIENT)
Facility: HOSPITAL | Age: 78
End: 2023-10-02
Payer: MEDICARE

## 2023-10-03 DIAGNOSIS — Z96.652 STATUS POST TOTAL LEFT KNEE REPLACEMENT: Primary | ICD-10-CM

## 2023-10-04 ENCOUNTER — HOSPITAL ENCOUNTER (OUTPATIENT)
Facility: HOSPITAL | Age: 78
Setting detail: RECURRING SERIES
Discharge: HOME | End: 2023-10-07
Payer: MEDICARE

## 2023-10-04 PROCEDURE — 97110 THERAPEUTIC EXERCISES: CPT | Performed by: PHYSICAL THERAPIST

## 2023-10-04 NOTE — THERAPY DISCHARGE
[address] [place] [address]  [address] [facility], [address] [place] [address] Way  Ph: [phone]     Fax: [phone]    DISCHARGE SUMMARY  Patient Name: Bertha Donohue : 1945   Treatment/Medical Diagnosis: Pain in right knee [M25.561]  Presence of right artificial knee joint [Z96.651]   Referral Source: She Atkins MD     Date of Initial Visit: 2023 Attended Visits: 18 Missed Visits: -     SUMMARY OF TREATMENT    Pt has been seen 18 times since [facility] and is demonstrating significant improvement in R knee mobility and strength. She is now ambulating without notable gait deviations without cane or AD household distances, but continues to use the cane for ambulation in the community because she feels more confident due to the L knee still hurting and feeling like it will give way at times. She has met all goals for R knee and is ready to transition to HEP and scheduled to have L TKR in 2 weeks. D/C PT. Progress toward goals / Updated goals:  []  See Progress Note/Recertification    Short Term Goals: To be accomplished in 9 treatments  Pt will be independent with HEP to promote progression of therapy services. [x] Met [] Not met [] Partially met  Date: 23     Pt will use ice/heat/massage to assist with inflammation and pain management as instructed and no pain > 4/10 on the VAS. [x] Met [] Not met [] Partially met  Date: 23-pt told to try heat for 10 min on calf if needed     Pt will verbalize the reasoning for and use proper sleeping techniques for pain/contracture prevention. [x] Met [] Not met [] Partially me  Date: 23     Patient will demonstrate improved heel strike and TKE at initial contact during gait with LRAD to complete 6 minute walk test to demonstrate improved mobility.       [x] Met [] Not met [] Partially met  Date:10/4 completed with good gait mechanics and [facility]   improving, still lacks TKE but decreasing AD to [facility] to

## 2023-10-04 NOTE — PROGRESS NOTES
PHYSICAL THERAPY - MEDICARE DAILY TREATMENT NOTE (updated 3/23)      Date: 10/4/2023          Patient Name:  Kasia Washington :  1945   Medical   Diagnosis:  Pain in right knee [M25.561]  Presence of right artificial knee joint [Z96.651] Treatment Diagnosis:  M25.561  RIGHT KNEE PAIN    Referral Source:  Jagdeep Mitchell MD Insurance:   Payor: [employer] / Plan: MEDICARE PART A AND B / Product Type: *No Product type* /                     Patient  verified yes     Visit #   Current  / Total 18 18   Time   In / Out 11:35 am 12:35 pm   Total Treatment Time 60 min   Total Timed Codes 50 min   1:1 Treatment Time 50 min      [facility] Totals Reminder:  bill using total billable   min of TIMED therapeutic procedures and modalities. 8-22 min = 1 unit; 23-37 min = 2 units; 38-52 min = 3 units; 53-67 min = 4 units; 68-82 min = 5 units            SUBJECTIVE    Pain Level (0-10 scale):0/10     Any medication changes, allergies to medications, adverse drug reactions, diagnosis change, or new procedure performed?: [x] No    [] Yes (see summary sheet for update)  Medications: Verified on Patient Summary List    Subjective functional status/changes:    Pt  reports less hamstring stiffness and no pain in the R knee, but R ankle wants to give occasionally. Reports RLE is still swelling, and she is has a compression stocking she uses some but does not feel it helps much. Reports she always uses her cane in community because she feels more confident due to the L knee still hurting and feeling like it will give way at times. States she is ready for her next surgery. OBJECTIVE      Therapeutic Procedures: Tx Min Billable or 1:1 Min (if diff from Tx Min) Procedure, Rationale, Specifics     38  85008 Therapeutic Exercise (timed):  increase ROM, strength, coordination, balance, and proprioception to improve patient's ability to progress to PLOF and address remaining functional goals.  (see flow sheet as applicable)

## 2023-10-12 ENCOUNTER — OFFICE VISIT (OUTPATIENT)
Age: 78
End: 2023-10-12
Payer: MEDICARE

## 2023-10-12 DIAGNOSIS — M17.12 OSTEOARTHRITIS OF LEFT KNEE, UNSPECIFIED OSTEOARTHRITIS TYPE: Primary | ICD-10-CM

## 2023-10-12 PROBLEM — N64.89 RADIAL SCAR OF BREAST: Status: ACTIVE | Noted: 2023-10-12

## 2023-10-12 PROBLEM — K80.20 ASYMPTOMATIC GALLSTONES: Status: ACTIVE | Noted: 2023-10-12

## 2023-10-12 PROBLEM — J18.9 PNEUMONIA: Status: ACTIVE | Noted: 2022-12-02

## 2023-10-12 PROBLEM — K40.90 RIGHT INGUINAL HERNIA: Status: ACTIVE | Noted: 2023-10-12

## 2023-10-12 PROCEDURE — 1090F PRES/ABSN URINE INCON ASSESS: CPT | Performed by: ORTHOPAEDIC SURGERY

## 2023-10-12 PROCEDURE — G8484 FLU IMMUNIZE NO ADMIN: HCPCS | Performed by: ORTHOPAEDIC SURGERY

## 2023-10-12 PROCEDURE — 1123F ACP DISCUSS/DSCN MKR DOCD: CPT | Performed by: ORTHOPAEDIC SURGERY

## 2023-10-12 PROCEDURE — G8427 DOCREV CUR MEDS BY ELIG CLIN: HCPCS | Performed by: ORTHOPAEDIC SURGERY

## 2023-10-12 PROCEDURE — 1036F TOBACCO NON-USER: CPT | Performed by: ORTHOPAEDIC SURGERY

## 2023-10-12 PROCEDURE — G8399 PT W/DXA RESULTS DOCUMENT: HCPCS | Performed by: ORTHOPAEDIC SURGERY

## 2023-10-12 PROCEDURE — 99214 OFFICE O/P EST MOD 30 MIN: CPT | Performed by: ORTHOPAEDIC SURGERY

## 2023-10-12 PROCEDURE — G8419 CALC BMI OUT NRM PARAM NOF/U: HCPCS | Performed by: ORTHOPAEDIC SURGERY

## 2023-10-12 RX ORDER — CEPHALEXIN 500 MG/1
500 CAPSULE ORAL EVERY 8 HOURS
Qty: 9 CAPSULE | Refills: 0 | Status: SHIPPED | OUTPATIENT
Start: 2023-10-12 | End: 2023-10-15

## 2023-10-12 RX ORDER — HYDROCODONE BITARTRATE AND ACETAMINOPHEN 5; 325 MG/1; MG/1
1 TABLET ORAL EVERY 4 HOURS PRN
Qty: 42 TABLET | Refills: 0 | Status: SHIPPED | OUTPATIENT
Start: 2023-10-12 | End: 2023-10-19

## 2023-10-12 NOTE — PROGRESS NOTES
prior medical record(s) regarding this patients care during this appointment. Review of Systems:   Patient is a pleasant appearing individual, appropriately dressed, well hydrated, well nourished, who is alert, appropriately oriented for age, and in no acute distress with a normal gait and normal affect who does not appear to be in any significant pain. Physical Exam:  Left Knee -Decrease range of motion with flexion, Knee arc of greater than 50 degrees, Some crepitation, Grossly neurovascularly intact, Good cap refill, No skin lesion, Moderate swelling, some gross instability, Some quadriceps weakness, Kellgren and Austin at least grade 4    Right Knee - Full Range of Motion, No crepitation, Grossly neurovascularly intact, Good cap refill, No skin lesion, No swelling, No gross instability, No quadriceps weakness     Inpatient status: The patient has admitted to severe pain in the affected knee and due to such pain they are unable to complete activities of daily living at home and/or work on a regular basis where conservative treatments have failed. After extensive discussion with the patient, they have chosen to receive a total knee replacement with the expectation of inpatient procedure. Their dependent functional status (i.e. lack of capable support and safety at home, pain management, comorbities, or difficulty ambulating with assistive walking devices) would deem them a candidate for an inpatient stay. The patient acknowledges and understand the plan. The risks of surgery were explained to the patient which include but not limited to infection, nerve injury, artery injury, tendon injury, poor result, poor wound healing, unforeseen incidence, bleeding, infection, nerve damage, failure to improve, worsening of symptoms, morbidity, and mortality risks were explained. All questions were answered. Patient was told of no guarantees. Patient accepts all risks and benefits.  A consent for surgery will be

## 2023-10-20 ENCOUNTER — HOSPITAL ENCOUNTER (OUTPATIENT)
Facility: HOSPITAL | Age: 78
Setting detail: RECURRING SERIES
Discharge: HOME | End: 2023-10-23
Payer: MEDICARE

## 2023-10-20 PROCEDURE — 97110 THERAPEUTIC EXERCISES: CPT

## 2023-10-20 PROCEDURE — 97162 PT EVAL MOD COMPLEX 30 MIN: CPT

## 2023-10-20 NOTE — THERAPY EVALUATION
stiffness. [] Met [] Not met [] Partially met Date:     Pt will have quad strength to perform SLS for 10-15 seconds for safe stance phase of gait without buckling. [] Met [] Not met [] Partially met Date:      TUG score <20 seconds without device to reduce risk for falls. [] Met [] Not met [] Partially met  Date:     Frequency / Duration: Patient to be seen 2-3 times per week for 18 treatments. Patient/ Caregiver education and instruction: Diagnosis, prognosis, activity modification and exercises [x]  Plan of care has been reviewed with PTA      Certification Period ([address] [facility] 90/ [facility] 60 DAYS):  10/20/23 - 1/18/23       Rina Rauschrobyn Thompson, PT       10/20/2023       1:22 PM        ===================================================================  I certify that the above Therapy Services are being furnished while the patient is under my care. I agree with the treatment plan and certify that this therapy is necessary. Physician's Signature:_________________________   DATE:_________   TIME:________                           Max Herron MD    ** Signature, Date and Time must be completed for valid certification **  Please sign and fax to [phone].   Thank you

## 2023-10-23 ENCOUNTER — HOSPITAL ENCOUNTER (OUTPATIENT)
Facility: HOSPITAL | Age: 78
Setting detail: RECURRING SERIES
Discharge: HOME | End: 2023-10-26
Payer: MEDICARE

## 2023-10-23 PROCEDURE — 97110 THERAPEUTIC EXERCISES: CPT | Performed by: PHYSICAL THERAPIST

## 2023-10-23 NOTE — PROGRESS NOTES
PHYSICAL THERAPY - MEDICARE DAILY TREATMENT NOTE (updated 3/23)      Date: 10/23/2023          Patient Name:  Calvin Wallis :  1945   Medical   Diagnosis:  Status post total left knee replacement [Z96.652] Treatment Diagnosis:  M25.562  LEFT KNEE PAIN    Referral Source:  Claribel Guerrero MD Insurance:   Payor: Felicia Murdock / Plan: MEDICARE PART A AND B / Product Type: *No Product type* /                     Patient  verified yes     Visit #   Current  / Total 2 18   Time   In / Out 10:50 11:45am   Total Treatment Time 48   Total Timed Codes 40   1:1 Treatment Time 40      [facility] Totals Reminder:  bill using total billable   min of TIMED therapeutic procedures and modalities. 8-22 min = 1 unit; 23-37 min = 2 units; 38-52 min = 3 units; 53-67 min = 4 units; 68-82 min = 5 units            SUBJECTIVE    Pain Level (0-10 scale): not really pain,  just tightness and stiffness    Any medication changes, allergies to medications, adverse drug reactions, diagnosis change, or new procedure performed?: [x] No    [] Yes (see summary sheet for update)  Medications: Verified on Patient Summary List    Subjective functional status/changes:     Patient reports she feels like this knee is more swollen and tight than when she had the R done. Reports doing HEP 3 times per day. States she has not had pain medication in a few days. Post op dressing and Ace wrap still in place, patient reporting she has virtual f/u with MD later this week. OBJECTIVE      Therapeutic Procedures: Tx Min Billable or 1:1 Min (if diff from Tx Min) Procedure, Rationale, Specifics   35  40928 Therapeutic Exercise (timed):  increase ROM, strength, coordination, balance, and proprioception to improve patient's ability to progress to PLOF and address remaining functional goals.  (see flow sheet as applicable)     Details if applicable:     5  26093 Manual Therapy (timed):  increase ROM and decrease edema to improve patient's ability to

## 2023-10-25 ENCOUNTER — HOSPITAL ENCOUNTER (OUTPATIENT)
Facility: HOSPITAL | Age: 78
Setting detail: RECURRING SERIES
Discharge: HOME | End: 2023-10-28
Payer: MEDICARE

## 2023-10-25 PROCEDURE — 97110 THERAPEUTIC EXERCISES: CPT

## 2023-10-25 NOTE — PATIENT INSTRUCTIONS
increase in swelling of the knee or leg, redness around the incision site, drainage, pus, or any oozing fluid from the incision please notify our office immediately. If you develop a fever greater than 101.5 or have any significant trauma or falls, please call and notify our office. A low-grade temperature below 101 can be normal for the first few weeks. Bruising & pain around your thigh, leg, & foot are normal for up to 6-8 weeks. You may experience a clicking or clunking noise in your knee, this is normal you now have an artificial implant in place that can cause these noises. Moving forward if you have any type of DENTAL WORK/CLEANINGS or any invasive procedures that involve a risk of bleeding we recommend that you pre medicate one hour prior to these procedures with antibiotics. Please let the performing provider know that you have a total knee implant in place so they may pre medicate you accordingly. Please contact our office at least 72 hours prior to needing these medications if they do not. Please do not resume any routine  dental cleanings until you are at least 6 weeks out from your surgery. At any time if you feel like you have stopped progressing in therapy or your knee feels worse, please call our office for an appointment to be seen.

## 2023-10-25 NOTE — PROGRESS NOTES
Alissa Echavarria (:  1945) is a Established patient, evaluated via telephone on 10/26/2023    [address], [address] [address]  Dept: [phone]  Dept Fax: [phone]   No chief complaint on file. Patient-Reported Vitals  No data recorded   No Known Allergies  Current Outpatient Medications   Medication Sig Dispense Refill    NONFORMULARY 1 capsule daily SUPER SUPPLEMENT FROM NATURE SUNSHINE      ibuprofen (ADVIL;MOTRIN) 200 MG tablet Take 1 tablet by mouth every 6 hours as needed for Pain      Psyllium (METAMUCIL) 48.57 % POWD Take by mouth daily      Calcium Carbonate-Vitamin D 600-3. 125 MG-MCG TABS Take 600 mg by mouth daily      fluticasone (FLONASE) 50 MCG/ACT nasal spray 2 sprays by Nasal route as needed      montelukast (SINGULAIR) 10 MG tablet Take 1 tablet by mouth as needed       No current facility-administered medications for this visit.       Patient Active Problem List   Diagnosis    Diverticulosis    Abnormal breast thermography    Onychomycosis    Spigelian hernia with bowel obstruction    Hyperlipidemia    Right inguinal hernia    Radial scar of breast    Asymptomatic gallstones    Pneumonia      Family History   Problem Relation Age of Onset    Heart Disease Father     Cancer Father         Brain tumor    Cancer Mother         Tumor behind eye      Social History     Socioeconomic History    Marital status:    Tobacco Use    Smoking status: Never    Smokeless tobacco: Never   Substance and Sexual Activity    Alcohol use: Never     Alcohol/week: 1.0 standard drink of alcohol    Drug use: Never     Social Determinants of Health     Financial Resource Strain: Low Risk  (2023)    Overall Financial Resource Strain (CARDIA)     Difficulty of Paying Living Expenses: Not hard at all   Food Insecurity: No Food Insecurity (2023)    Hunger Vital Sign     Worried About

## 2023-10-26 ENCOUNTER — TELEMEDICINE (OUTPATIENT)
Age: 78
End: 2023-10-26

## 2023-10-26 DIAGNOSIS — Z96.652 STATUS POST TOTAL KNEE REPLACEMENT, LEFT: Primary | ICD-10-CM

## 2023-10-27 ENCOUNTER — HOSPITAL ENCOUNTER (OUTPATIENT)
Facility: HOSPITAL | Age: 78
Setting detail: RECURRING SERIES
Discharge: HOME | End: 2023-10-30
Payer: MEDICARE

## 2023-10-27 PROCEDURE — 97110 THERAPEUTIC EXERCISES: CPT

## 2023-10-27 PROCEDURE — 97140 MANUAL THERAPY 1/> REGIONS: CPT

## 2023-10-27 NOTE — PROGRESS NOTES
PHYSICAL THERAPY - MEDICARE DAILY TREATMENT NOTE (updated 3/23)      Date: 10/27/2023          Patient Name:  Dayday Plascencia :  1945   Medical   Diagnosis:  Status post total left knee replacement [Z96.652] Treatment Diagnosis:  M25.562  LEFT KNEE PAIN    Referral Source:  Collette More, MD Insurance:   Payor: [employer] / Plan: MEDICARE PART A AND B / Product Type: *No Product type* /                     Patient  verified yes     Visit #   Current  / Total 4 18   Time   In / Out 1432 1525   Total Treatment Time 53   Total Timed Codes 53   1:1 Treatment Time 48      [facility] Totals Reminder:  bill using total billable   min of TIMED therapeutic procedures and modalities. 8-22 min = 1 unit; 23-37 min = 2 units; 38-52 min = 3 units; 53-67 min = 4 units; 68-82 min = 5 units            SUBJECTIVE    Pain Level (0-10 scale): not really pain,  just tightness and stiffness    Any medication changes, allergies to medications, adverse drug reactions, diagnosis change, or new procedure performed?: [x] No    [] Yes (see summary sheet for update)  Medications: Verified on Patient Summary List    Subjective functional status/changes:   C/o tightness upon arrival no pain , Had a virtual visit with nurse yesterday , pt removed dressings and found a blister on the inside of the L knee which is large and then several little ones . Extensive bruising. OBJECTIVE      Therapeutic Procedures: Tx Min Billable or 1:1 Min (if diff from Tx Min) Procedure, Rationale, Specifics   30  67504 Therapeutic Exercise (timed):  increase ROM, strength, coordination, balance, and proprioception to improve patient's ability to progress to PLOF and address remaining functional goals. (see flow sheet as applicable)     Details if applicable:     70 37509 Manual Therapy (timed):  increase ROM and decrease edema to improve patient's ability to progress to PLOF and address remaining functional goals.   The manual therapy interventions

## 2023-10-30 ENCOUNTER — TELEPHONE (OUTPATIENT)
Age: 78
End: 2023-10-30

## 2023-10-30 ENCOUNTER — HOSPITAL ENCOUNTER (OUTPATIENT)
Facility: HOSPITAL | Age: 78
Setting detail: RECURRING SERIES
Discharge: HOME | End: 2023-11-02
Payer: MEDICARE

## 2023-10-30 PROCEDURE — 97110 THERAPEUTIC EXERCISES: CPT

## 2023-10-30 RX ORDER — CEPHALEXIN 500 MG/1
500 CAPSULE ORAL 4 TIMES DAILY
Qty: 28 CAPSULE | Refills: 0 | Status: SHIPPED | OUTPATIENT
Start: 2023-10-30 | End: 2023-11-06

## 2023-10-30 NOTE — PROGRESS NOTES
PHYSICAL THERAPY - MEDICARE DAILY TREATMENT NOTE (updated 3/23)      Date: 10/30/2023          Patient Name:  Eugenio Cosby :  1945   Medical   Diagnosis:  Status post total left knee replacement [Z96.652] Treatment Diagnosis:  M25.562  LEFT KNEE PAIN    Referral Source:  Cj Decker MD Insurance:   Payor: Nabila Lock / Plan: MEDICARE PART A AND B / Product Type: *No Product type* /                     Patient  verified yes     Visit #   Current  / Total 5 18   Time   In / Out 01:00 pm 01:59 pm   Total Treatment Time 55 min   Total Timed Codes 45 min   1:1 Treatment Time 45 min      [facility] Totals Reminder:  bill using total billable   min of TIMED therapeutic procedures and modalities. 8-22 min = 1 unit; 23-37 min = 2 units; 38-52 min = 3 units; 53-67 min = 4 units; 68-82 min = 5 units            SUBJECTIVE    Pain Level (0-10 scale): 6/10,  just tightness and stiffness    Any medication changes, allergies to medications, adverse drug reactions, diagnosis change, or new procedure performed?: [x] No    [] Yes (see summary sheet for update)  Medications: Verified on Patient Summary List    Subjective functional status/changes:   Patient stated she is still not able to go around on the bike. She also stated she is stopping off at the drugstore to get ointment for blister. \"It popped and they are giving me an ointment to put on it . \"         OBJECTIVE      Therapeutic Procedures: Tx Min Billable or 1:1 Min (if diff from Tx Min) Procedure, Rationale, Specifics   45  48822 Therapeutic Exercise (timed):  increase ROM, strength, coordination, balance, and proprioception to improve patient's ability to progress to PLOF and address remaining functional goals. (see flow sheet as applicable)     Details if applicable:       44945 Manual Therapy (timed):  increase ROM and decrease edema to improve patient's ability to progress to PLOF and address remaining functional goals.   The manual therapy interventions

## 2023-11-01 ENCOUNTER — HOSPITAL ENCOUNTER (OUTPATIENT)
Facility: HOSPITAL | Age: 78
Setting detail: RECURRING SERIES
Discharge: HOME | End: 2023-11-04
Payer: MEDICARE

## 2023-11-01 PROCEDURE — 97110 THERAPEUTIC EXERCISES: CPT

## 2023-11-01 NOTE — PROGRESS NOTES
PHYSICAL THERAPY - MEDICARE DAILY TREATMENT NOTE (updated 3/23)      Date: 2023          Patient Name:  Erasmo Ramos :  1945   Medical   Diagnosis:  Status post total left knee replacement [Z96.652] Treatment Diagnosis:  M25.562  LEFT KNEE PAIN    Referral Source:  Akira Pereyra MD Insurance:   Payor: Coleman Pereira / Plan: MEDICARE PART A AND B / Product Type: *No Product type* /                     Patient  verified yes     Visit #   Current  / Total 6 18   Time   In / Out 01:00 pm 01:50 pm   Total Treatment Time 50 min   Total Timed Codes 50 min   1:1 Treatment Time 50 min      [facility] Totals Reminder:  bill using total billable   min of TIMED therapeutic procedures and modalities. 8-22 min = 1 unit; 23-37 min = 2 units; 38-52 min = 3 units; 53-67 min = 4 units; 68-82 min = 5 units            SUBJECTIVE    Pain Level (0-10 scale): 3/10,  just tightness and stiffness    Any medication changes, allergies to medications, adverse drug reactions, diagnosis change, or new procedure performed?: [x] No    [] Yes (see summary sheet for update)  Medications: Verified on Patient Summary List    Subjective functional status/changes:   Patient stated she felt fine after last session. \"The next day my leg ached , so I rested it. \"         OBJECTIVE      Therapeutic Procedures: Tx Min Billable or 1:1 Min (if diff from Tx Min) Procedure, Rationale, Specifics   50  85852 Therapeutic Exercise (timed):  increase ROM, strength, coordination, balance, and proprioception to improve patient's ability to progress to PLOF and address remaining functional goals. (see flow sheet as applicable)     Details if applicable:       36437 Manual Therapy (timed):  increase ROM and decrease edema to improve patient's ability to progress to PLOF and address remaining functional goals. The manual therapy interventions were performed at a separate and distinct time from the therapeutic activities interventions .  (see flow sheet as

## 2023-11-03 ENCOUNTER — HOSPITAL ENCOUNTER (OUTPATIENT)
Facility: HOSPITAL | Age: 78
Setting detail: RECURRING SERIES
Discharge: HOME | End: 2023-11-06
Payer: MEDICARE

## 2023-11-03 PROCEDURE — 97110 THERAPEUTIC EXERCISES: CPT | Performed by: PHYSICAL THERAPIST

## 2023-11-03 NOTE — PROGRESS NOTES
[x]  Plan of care has been reviewed with PTA        Certification Period ([address] 90/ [facility] 60 DAYS):  10/20/23 - 23     PLAN    Treatment Plan may include any combination of the followin Therapeutic Exercise, 05631 Neuromuscular Re-Education, 51562 Manual Therapy, 13363 Therapeutic Activity, 78043 Self Care/Home Management, 51360 Electrical Stim unattended, and 81611 Gait Training    Yes  Continue plan of care  Re-Cert Due:   [x]  Upgrade activities as tolerated  []  Discharge due to :  []  Other:      Young Ayala       11/3/2023       12:55 PM

## 2023-11-06 ENCOUNTER — HOSPITAL ENCOUNTER (OUTPATIENT)
Facility: HOSPITAL | Age: 78
Setting detail: RECURRING SERIES
Discharge: HOME | End: 2023-11-09
Payer: MEDICARE

## 2023-11-06 PROCEDURE — 97110 THERAPEUTIC EXERCISES: CPT

## 2023-11-06 PROCEDURE — 97140 MANUAL THERAPY 1/> REGIONS: CPT

## 2023-11-06 NOTE — PROGRESS NOTES
PHYSICAL THERAPY - MEDICARE DAILY TREATMENT NOTE (updated 3/23)      Date: 2023          Patient Name:  Mik Wallis :  1945   Medical   Diagnosis:  Status post total left knee replacement [Z96.652] Treatment Diagnosis:  M25.562  LEFT KNEE PAIN    Referral Source:  Tomas Celaya MD Insurance:   Payor: [employer] / Plan: MEDICARE PART A AND B / Product Type: *No Product type* /                     Patient  verified yes     Visit #   Current  / Total 8 18   Time   In / Out 01:00 pm 02:05 pm   Total Treatment Time 60 min   Total Timed Codes 50 min   1:1 Treatment Time 50 min      [facility] Totals Reminder:  bill using total billable   min of TIMED therapeutic procedures and modalities. 8-22 min = 1 unit; 23-37 min = 2 units; 38-52 min = 3 units; 53-67 min = 4 units; 68-82 min = 5 units            SUBJECTIVE    Pain Level (0-10 scale): 0/10,  just tightness and stiffness    Any medication changes, allergies to medications, adverse drug reactions, diagnosis change, or new procedure performed?: [] No    [x] Yes (see summary sheet for update)    MD started her back on 500 mg cephalexin on 10/30/23 x 7 days- added to summary sheet    Medications: Verified on Patient Summary List    Subjective functional status/changes:   Patient stated today is th last day she takes her antibiotics. She is complaining of stiffness and pain to medial aspect of L knee. She still has swelling in L knee. OBJECTIVE      Therapeutic Procedures: Tx Min Billable or 1:1 Min (if diff from Tx Min) Procedure, Rationale, Specifics   40  15556 Therapeutic Exercise (timed):  increase ROM, strength, coordination, balance, and proprioception to improve patient's ability to progress to PLOF and address remaining functional goals.  (see flow sheet as applicable)     Details if applicable:     10  20710 Manual Therapy (timed):  increase ROM and decrease edema to improve patient's ability to progress to PLOF and address remaining

## 2023-11-08 ENCOUNTER — HOSPITAL ENCOUNTER (OUTPATIENT)
Facility: HOSPITAL | Age: 78
Setting detail: RECURRING SERIES
Discharge: HOME | End: 2023-11-11
Payer: MEDICARE

## 2023-11-08 PROCEDURE — 97110 THERAPEUTIC EXERCISES: CPT | Performed by: PHYSICAL THERAPIST

## 2023-11-08 NOTE — PROGRESS NOTES
Progress Note/Recertification    Short Term Goals: To be accomplished in 8-9 treatments. Pt will be independent with HEP to promote progression of therapy services. [x] Met [] Not met [] Partially met  Date:10/30     Pt will use ice/heat/massage to assist with inflammation and pain management as instructed and no pain > 5/10 on the VAS. [x] Met [] Not met [] Partially met  Date:10/30     Pt will verbalize the reasoning for and use proper sleeping techniques for pain/contracture prevention. [x] Met [] Not met [] Partially me  Date:11/3 verbalizes understanding of proper techniques     Pt will verbalize and use proper positioning techniques as instructed such as elevating LE to decrease swelling. [] Met [] Not met [x] Partially met  Date:11/3 reports elevating some, fluctuating swelling continues entire LLE     0-90 deg knee AROM to improve L knee mobility. [x] Met [] Not met [] Partially met  Date: 11/1/23  flexion 100 deg      Pt will be able to perform a SLR without extensor lag for greater ease with lifting LE onto/off bed. [] Met [] Not met [] Partially met  Date:      Long Term Goals: To be accomplished in 18 treatments. Pt will have improved AMPAC score by 5%. [] Met [] Not met [] Partially met Date:      Pt can ambulate community distances without an assistive device and without knee pain/instability to get groceries. [] Met [] Not met [x] Partially met Date: 11/3 improving, decreased to [facility] in clinic today, still using RW in community     Pt can get up after sitting for >/= 30 minutes without difficulty, stiffness, or stumbling for safe transfers. [] Met [] Not met [] Partially met Date:      Pt can negotiate uneven terrain without an assistive device without knee pain or instability, like walking through grass.       [] Met [] Not met [] Partially met  Date:       Pt will have sufficient LE strength to go up and down steps with 1-2 rail support without

## 2023-11-10 ENCOUNTER — HOSPITAL ENCOUNTER (OUTPATIENT)
Facility: HOSPITAL | Age: 78
Setting detail: RECURRING SERIES
Discharge: HOME | End: 2023-11-13
Payer: MEDICARE

## 2023-11-10 PROCEDURE — 97110 THERAPEUTIC EXERCISES: CPT | Performed by: PHYSICAL THERAPIST

## 2023-11-10 NOTE — PROGRESS NOTES
[address] [place] [address], [address] [facility], [address] [facility]  Ph: [phone]     Fax: [phone]    PHYSICAL THERAPY PROGRESS NOTE  Patient Name:  Yvan Ayala :  1945   Treatment/Medical Diagnosis: Status post total left knee replacement [P95.721]   Referral Source:  Ana Lopez MD     Date of Initial Visit:  10/20/2023 Attended Visits:  10 Missed Visits:  N/a     SUMMARY OF TREATMENT/ASSESSMENT:  Patient has been seen for 10 visits since [facility] and is reporting improving mobility but persistent swelling and stiffness. Upon reassessment, she is demonstrating improving ROM, strength, TUG, balance, function per [facility],  and gait but continues with deficits impacting her mobility. She has progressed with gait to [facility] for short distances, but fatigues with > 3 minutes. Able to ambulate short distances without AD, but notable limp so advised to continue SPC short distances. Pt continues to benefit from skilled services to improve performance with joint stabilization and mechanics, knee flexibility and strength, neuromuscular activation and awareness of the LE for joint protection, and to address impairments in order to meet functional goals. Patient will continue to benefit from skilled PT / OT services to modify and progress therapeutic interventions, analyze and address functional mobility deficits, analyze and address ROM deficits, analyze and address strength deficits, analyze and address soft tissue restrictions, analyze and cue for proper movement patterns, and analyze and modify for postural abnormalities to address functional deficits and attain remaining goals. Progress toward goals / Updated goals:  []  See Progress Note/Recertification    Short Term Goals: To be accomplished in 8-9 treatments. Pt will be independent with HEP to promote progression of therapy services.       [x] Met [] Not met [] Partially met  Date:10/30     Pt will use
deficits, analyze and address strength deficits, analyze and address soft tissue restrictions, analyze and cue for proper movement patterns, and analyze and modify for postural abnormalities to address functional deficits and attain remaining goals. Progress toward goals / Updated goals:  []  See Progress Note/Recertification    Short Term Goals: To be accomplished in 8-9 treatments. Pt will be independent with HEP to promote progression of therapy services. [x] Met [] Not met [] Partially met  Date:10/30     Pt will use ice/heat/massage to assist with inflammation and pain management as instructed and no pain > 5/10 on the VAS. [x] Met [] Not met [] Partially met  Date:10/30     Pt will verbalize the reasoning for and use proper sleeping techniques for pain/contracture prevention. [x] Met [] Not met [] Partially me  Date:11/3 verbalizes understanding of proper techniques     Pt will verbalize and use proper positioning techniques as instructed such as elevating LE to decrease swelling. [] Met [] Not met [x] Partially met  Date:11/3 reports elevating some, fluctuating swelling continues entire LLE  11/10 improving, wearing compression socks today with some decrease in swelling noted     0-90 deg knee AROM to improve L knee mobility. [x] Met [] Not met [] Partially met  Date: 11/1/23  flexion 100 deg      Pt will be able to perform a SLR without extensor lag for greater ease with lifting LE onto/off bed. [x] Met [] Not met [] Partially met  Date: 11/10     Long Term Goals: To be accomplished in 18 treatments. Pt will have improved AMPAC score by 5%. [x] Met [] Not met [] Partially met Date:11/10      Pt can ambulate community distances without an assistive device and without knee pain/instability to get groceries.        [] Met [] Not met [x] Partially met Date: 11/3 improving, decreased to [facility] in clinic today, still using RW in community  11/10 same, but increasing

## 2023-11-13 ENCOUNTER — HOSPITAL ENCOUNTER (OUTPATIENT)
Facility: HOSPITAL | Age: 78
Setting detail: RECURRING SERIES
Discharge: HOME | End: 2023-11-16
Payer: MEDICARE

## 2023-11-13 PROCEDURE — 97110 THERAPEUTIC EXERCISES: CPT

## 2023-11-13 NOTE — PROGRESS NOTES
PHYSICAL THERAPY - MEDICARE DAILY TREATMENT NOTE (updated 3/23)      Date: 2023          Patient Name:  Kasia Washington :  1945   Medical   Diagnosis:  Status post total left knee replacement [Z96.652] Treatment Diagnosis:  M25.562  LEFT KNEE PAIN    Referral Source:  Jagdeep Mitchell MD Insurance:   Payor: [employer] / Plan: MEDICARE PART A AND B / Product Type: *No Product type* /                     Patient  verified yes     Visit #   Current  / Total 10 18   Time   In / Out 01:00 pm 01:50 pm   Total Treatment Time 50 min   Total Timed Codes 50 min   1:1 Treatment Time 50 min      [facility] Totals Reminder:  bill using total billable   min of TIMED therapeutic procedures and modalities. 8-22 min = 1 unit; 23-37 min = 2 units; 38-52 min = 3 units; 53-67 min = 4 units; 68-82 min = 5 units            SUBJECTIVE    Pain Level (0-10 scale): 0/10    Any medication changes, allergies to medications, adverse drug reactions, diagnosis change, or new procedure performed?: [x] No    [] Yes (see summary sheet for update)  Medications: Verified on Patient Summary List    Subjective functional status/changes:   Patient reporting she cooked breakfast this morning and did a little cleaning. She wanted to know the difference between where she is now with the L knee vs the R knee. OBJECTIVE      Therapeutic Procedures: Tx Min Billable or 1:1 Min (if diff from Tx Min) Procedure, Rationale, Specifics   44  00726 Therapeutic Exercise (timed):  increase ROM, strength, coordination, balance, and proprioception to improve patient's ability to progress to PLOF and address remaining functional goals. (see flow sheet as applicable)     Details if applicable:     -  20722 Manual Therapy (timed):  increase ROM and decrease edema to improve patient's ability to progress to PLOF and address remaining functional goals.   The manual therapy interventions were performed at a separate and distinct time from the therapeutic

## 2023-11-15 ENCOUNTER — TELEMEDICINE (OUTPATIENT)
Age: 78
End: 2023-11-15

## 2023-11-15 ENCOUNTER — HOSPITAL ENCOUNTER (OUTPATIENT)
Facility: HOSPITAL | Age: 78
Setting detail: RECURRING SERIES
Discharge: HOME | End: 2023-11-18
Payer: MEDICARE

## 2023-11-15 DIAGNOSIS — Z96.652 STATUS POST TOTAL KNEE REPLACEMENT, LEFT: Primary | ICD-10-CM

## 2023-11-15 PROCEDURE — 97110 THERAPEUTIC EXERCISES: CPT

## 2023-11-15 NOTE — PROGRESS NOTES
(7/17/2023)    Hunger Vital Sign     Worried About Running Out of Food in the Last Year: Never true     [address] in the Last Year: Never true   Transportation Needs: Unknown (7/17/2023)    PRAPARE - Transportation     Lack of Transportation (Non-Medical): No   Intimate Partner Violence: Not At Risk (6/21/2023)    Humiliation, Afraid, Rape, and Kick questionnaire     Fear of Current or Ex-Partner: No     Emotionally Abused: No     Physically Abused: No     Sexually Abused: No   Housing Stability: Unknown (7/17/2023)    Housing Stability Vital Sign     Unstable Housing in the Last Year: No      Past Surgical History:   Procedure Laterality Date    BREAST BIOPSY Left 10/28/2020    Left Breast Wire Localized Excisional Biopsy performed by Sean Garrett MD at [address]      hysterectomy    HERNIA REPAIR      HERNIA REPAIR      HERNIA REPAIR  07/18/2021    ORTHOPEDIC SURGERY      knee      Past Medical History:   Diagnosis Date    Arthritis     Thyroid disease         I have reviewed and agree with [address] and ROS and intake form in chart and the record furthermore I have reviewed prior medical record(s) regarding this patients care during this appointment. Subjective:      Patient presents for postop care following left TKA. Surgery was on 10/18/2023. Ambulating good with a cane and walker. Patient states she is able to walk without the assistive devices, however, she then begins to walk with a limp so PT encourage her to continue utilizing the assistive devices until she is able to walk with a normal gait. Pain is a 2/10. Pain is controlled with current analgesics. Medication(s) being used: analgesics including acetaminophen. Objective: There were no vitals taken for this visit.     General:  alert, cooperative, no distress, appears stated age   ROM: Right knee - Neurovascularly intact with good cap refill, full range of motion and full strength, well

## 2023-11-15 NOTE — PROGRESS NOTES
PHYSICAL THERAPY - MEDICARE DAILY TREATMENT NOTE (updated 3/23)      Date: 11/15/2023          Patient Name:  Tal Santizo :  1945   Medical   Diagnosis:  Status post total left knee replacement [Z96.652] Treatment Diagnosis:  M25.562  LEFT KNEE PAIN    Referral Source:  Ehsan Shaw MD Insurance:   Payor: Martin Carrion / Plan: MEDICARE PART A AND B / Product Type: *No Product type* /                     Patient  verified yes     Visit #   Current  / Total 12 18   Time   In / Out 01:00 pm 02:00 pm   Total Treatment Time 60 min   Total Timed Codes 50 min   1:1 Treatment Time 50 min      [facility] Totals Reminder:  bill using total billable   min of TIMED therapeutic procedures and modalities. 8-22 min = 1 unit; 23-37 min = 2 units; 38-52 min = 3 units; 53-67 min = 4 units; 68-82 min = 5 units            SUBJECTIVE    Pain Level (0-10 scale): 0/10    Any medication changes, allergies to medications, adverse drug reactions, diagnosis change, or new procedure performed?: [x] No    [] Yes (see summary sheet for update)  Medications: Verified on Patient Summary List    Subjective functional status/changes:   Patient stated she had a virtual appointment with her MD . Jefferson Martinez said I was doing real good and incision looked good. She reports she did a lot of walking this morning with the cane . She ambulated into department with RW. \"I was afraid my knee would act up since I walked so much with the cane this morning. \"      OBJECTIVE      Therapeutic Procedures: Tx Min Billable or 1:1 Min (if diff from Tx Min) Procedure, Rationale, Specifics   45  46364 Therapeutic Exercise (timed):  increase ROM, strength, coordination, balance, and proprioception to improve patient's ability to progress to PLOF and address remaining functional goals.  (see flow sheet as applicable)     Details if applicable:     -  99266 Manual Therapy (timed):  increase ROM and decrease edema to improve patient's ability to progress to PLOF and

## 2023-11-15 NOTE — PATIENT INSTRUCTIONS
Patient Education        Total Knee Replacement: What to Expect at [address] [facility] had a total knee replacement. The doctor replaced the worn ends of the bones that connect to your knee (thighbone and lower leg bone) with plastic and metal parts. When you leave the hospital, you should be able to move around with a walker or crutches. But you will need someone to help you at home until you have more energy and can move around better. You will go home with a bandage and stitches, staples, skin glue, or tape strips. Change the bandage as your doctor tells you to. If you have stitches or staples, your doctor will remove them about 2 weeks after your surgery. Glue or tape strips will fall off on their own over time. You may still have some mild pain, and the area may be swollen for a few months after surgery. Your knee will continue to improve for up to a year. You will probably use a walker for some time after surgery. When you are ready, you can use a cane. You may be able to walk without support after a couple weeks, or when you are comfortable. You will need to do months of physical rehabilitation (rehab) after a knee replacement. Rehab will help you strengthen the muscles of the knee and help you regain movement. After you recover, your artificial knee will allow you to do normal daily activities with less pain or no pain at all. You may be able to hike, dance, or ride a bike. Talk to your doctor about whether you can do more strenuous activities. Always tell your caregivers that you have an artificial knee. How long it will take to walk on your own, return to normal activities, and go back to work depends on your health and how well your rehabilitation (rehab) program goes. The better you do with your rehab exercises, the quicker you will get your strength and movement back. This care sheet gives you a general idea about how long it will take for you to recover.  But each person recovers at a

## 2023-11-17 ENCOUNTER — HOSPITAL ENCOUNTER (OUTPATIENT)
Facility: HOSPITAL | Age: 78
Setting detail: RECURRING SERIES
Discharge: HOME | End: 2023-11-20
Payer: MEDICARE

## 2023-11-17 PROCEDURE — 97110 THERAPEUTIC EXERCISES: CPT | Performed by: PHYSICAL THERAPIST

## 2023-11-17 NOTE — PROGRESS NOTES
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 8-22 min = 1 unit; 23-37 min = 2 units; 38-52 min = 3 units; 53-67 min = 4 units; 68-82 min = 5 units            SUBJECTIVE    Pain Level (0-10 scale): 0/10    Any medication changes, allergies to medications, adverse drug reactions, diagnosis change, or new procedure performed?: [x] No    [] Yes (see summary sheet for update)  Medications: Verified on Patient Summary List    Subjective functional status/changes:   Patient stated she stills feels like she needs to use the walker when she is in the community because she states she does not trust her knee when she gets shooting pain in her knee. She reports the shooting pains are unpredictable and happening at least 1-2x day, but no episodes of knee giving way. OBJECTIVE      Therapeutic Procedures: Tx Min Billable or 1:1 Min (if diff from Tx Min) Procedure, Rationale, Specifics   50  62079 Therapeutic Exercise (timed):  increase ROM, strength, coordination, balance, and proprioception to improve patient's ability to progress to PLOF and address remaining functional goals.  (see flow sheet as applicable)     Details if applicable:     -  83629 Manual Therapy (timed):  increase ROM and decrease edema to improve patient's ability to progress to PLOF and address

## 2023-11-20 ENCOUNTER — HOSPITAL ENCOUNTER (OUTPATIENT)
Facility: HOSPITAL | Age: 78
Setting detail: RECURRING SERIES
Discharge: HOME | End: 2023-11-23
Payer: MEDICARE

## 2023-11-20 PROCEDURE — 97110 THERAPEUTIC EXERCISES: CPT

## 2023-11-20 NOTE — PROGRESS NOTES
interventions . (see flow sheet as applicable)     Details if applicable:  patella mobs and STM to medial aspect of L knee     84245 Gait Training (timed): To improve safety and dynamic movement with household/community ambulation. (see flow sheet as applicable)  Details if applicable:  amb with SPC x 3 minutes, TUG               45     Total Total       Modalities Rationale:     decrease inflammation and decrease pain to improve patient's ability to progress to PLOF and address remaining functional goals. min [] Estim Unattended,             type/location:       []  w/ice    []  w/heat        min [] Estim Attended,             type/location:       []  w/ice   []  w/heat         []  w/US   []  TENS insruct            min []  Mechanical Traction,        type/lbs:        []  pro      []  sup           []  int       []  cont            []  before manual           []  after manual     min []  Ultrasound,         settings/location:     15 min  unbilled [x]  Ice     []  Heat            location/position: L knee        min []  Vasopneumatic Device,      press/temp:   pre-treatment girth :    post-treatment girth :    measured at (landmark       location) : If using vaso (only need to measure limb vaso being performed on)        min []  Other:        Skin assessment post-treatment (if applicable):    [x]  intact    []  redness- no adverse reaction                 []redness - adverse reaction:              [x]  Patient Education billed concurrently with other procedures   [x] Review HEP    [] Progressed/Changed HEP, detail:    [x] Other detail:Observed area patient called MD. Small pin like area in size from scab coming off incision. Other Objective/Functional Measures    Continued with ex per flow sheet-  Looked at area patient was concerned with and advised her it was normal from scab coming off incision.      Pain Level at end of session (0-10 scale): 0/10 \"muscles tired\"      Assessment Patient with good

## 2023-11-27 ENCOUNTER — HOSPITAL ENCOUNTER (OUTPATIENT)
Facility: HOSPITAL | Age: 78
Setting detail: RECURRING SERIES
Discharge: HOME | End: 2023-11-30
Payer: MEDICARE

## 2023-11-27 PROCEDURE — 97110 THERAPEUTIC EXERCISES: CPT

## 2023-11-27 NOTE — PROGRESS NOTES
or discomfort. Patient ROM is greater in L  LE in prone position . Patient still has difficulty with SLS, so added theraband . Worked more on equipment to show patient what she can use at the gym. No pain pre or post treatment session . Patient will continue to benefit from skilled PT / OT services to modify and progress therapeutic interventions, analyze and address functional mobility deficits, analyze and address ROM deficits, analyze and address strength deficits, analyze and address soft tissue restrictions, analyze and cue for proper movement patterns, and analyze and modify for postural abnormalities to address functional deficits and attain remaining goals. Progress toward goals / Updated goals:  []  See Progress Note/Recertification    Short Term Goals: To be accomplished in 8-9 treatments. Pt will be independent with HEP to promote progression of therapy services. [x] Met [] Not met [] Partially met  Date:10/30     Pt will use ice/heat/massage to assist with inflammation and pain management as instructed and no pain > 5/10 on the VAS. [x] Met [] Not met [] Partially met  Date:10/30     Pt will verbalize the reasoning for and use proper sleeping techniques for pain/contracture prevention. [x] Met [] Not met [] Partially me  Date:11/3 verbalizes understanding of proper techniques     Pt will verbalize and use proper positioning techniques as instructed such as elevating LE to decrease swelling. [] Met [] Not met [x] Partially met  Date:11/3 reports elevating some, fluctuating swelling continues entire LLE  11/10 improving, wearing compression socks today with some decrease in swelling noted     0-90 deg knee AROM to improve L knee mobility. [x] Met [] Not met [] Partially met  Date: 11/1/23  flexion 100 deg      Pt will be able to perform a SLR without extensor lag for greater ease with lifting LE onto/off bed.       [x] Met [] Not met [] Partially met  Date: 11/10     Long Term

## 2023-11-29 ENCOUNTER — HOSPITAL ENCOUNTER (OUTPATIENT)
Facility: HOSPITAL | Age: 78
Setting detail: RECURRING SERIES
Discharge: HOME | End: 2023-12-02
Payer: MEDICARE

## 2023-11-29 PROCEDURE — 97110 THERAPEUTIC EXERCISES: CPT

## 2023-11-29 PROCEDURE — 97112 NEUROMUSCULAR REEDUCATION: CPT

## 2023-11-29 NOTE — PROGRESS NOTES
PHYSICAL THERAPY - MEDICARE DAILY TREATMENT NOTE (updated 3/23)      Date: 2023          Patient Name:  Melanie Curry :  1945   Medical   Diagnosis:  Status post total left knee replacement [Z96.652] Treatment Diagnosis:  M25.562  LEFT KNEE PAIN    Referral Source:  Adalberto Harris MD Insurance:   Payor: Juanitojerad Stage / Plan: MEDICARE PART A AND B / Product Type: *No Product type* /                     Patient  verified yes     Visit #   Current  / Total 16 18   Time   In / Out 01:45 pm 02:45 pm   Total Treatment Time 55 min   Total Timed Codes 45 min   1:1 Treatment Time 45 min      [facility] Totals Reminder:  bill using total billable   min of TIMED therapeutic procedures and modalities. 8-22 min = 1 unit; 23-37 min = 2 units; 38-52 min = 3 units; 53-67 min = 4 units; 68-82 min = 5 units            SUBJECTIVE    Pain Level (0-10 scale): 0/10    Any medication changes, allergies to medications, adverse drug reactions, diagnosis change, or new procedure performed?: [x] No    [] Yes (see summary sheet for update)  Medications: Verified on Patient Summary List    Subjective functional status/changes:   Patient stated she just wants to walk normal. She also reports scabs just came off. OBJECTIVE      Therapeutic Procedures: Tx Min Billable or 1:1 Min (if diff from Tx Min) Procedure, Rationale, Specifics   15  53610 Therapeutic Exercise (timed):  increase ROM, strength, coordination, balance, and proprioception to improve patient's ability to progress to PLOF and address remaining functional goals. (see flow sheet as applicable)     Details if applicable:     -  75767 Manual Therapy (timed):  increase ROM and decrease edema to improve patient's ability to progress to PLOF and address remaining functional goals. The manual therapy interventions were performed at a separate and distinct time from the therapeutic activities interventions .  (see flow sheet as applicable)     Details if applicable:

## 2023-12-01 ENCOUNTER — HOSPITAL ENCOUNTER (OUTPATIENT)
Facility: HOSPITAL | Age: 78
Setting detail: RECURRING SERIES
Discharge: HOME | End: 2023-12-04
Payer: MEDICARE

## 2023-12-01 PROCEDURE — 97112 NEUROMUSCULAR REEDUCATION: CPT

## 2023-12-01 PROCEDURE — 97110 THERAPEUTIC EXERCISES: CPT

## 2023-12-01 NOTE — PROGRESS NOTES
Not met [x] Partially met  Date:11/3 reports elevating some, fluctuating swelling continues entire LLE  11/10 improving, wearing compression socks today with some decrease in swelling noted     0-90 deg knee AROM to improve L knee mobility. [x] Met [] Not met [] Partially met  Date: 11/1/23  flexion 100 deg      Pt will be able to perform a SLR without extensor lag for greater ease with lifting LE onto/off bed. [x] Met [] Not met [] Partially met  Date: 11/10     Long Term Goals: To be accomplished in 18 treatments. Pt will have improved AMPAC score by 5%. [x] Met [] Not met [] Partially met Date:11/10      Pt can ambulate community distances without an assistive device and without knee pain/instability to get groceries. [] Met [] Not met [x] Partially met Date: 11/3 improving, decreased to [facility] in clinic today, still using RW in community  11/10 same, but increasing distance with SPC     Pt can get up after sitting for >/= 30 minutes without difficulty, stiffness, or stumbling for safe transfers. [] Met [] Not met [x] Partially met Date:11/10 improving, but still stiff      Pt can negotiate uneven terrain without an assistive device without knee pain or instability, like walking through grass. [] Met [x] Not met [] Partially met  Date:11/10 not attempted       Pt will have sufficient LE strength to go up and down steps with 1-2 rail support without difficulty or fatigue for gait safety. [] Met [] Not met [x] Partially met Date: 11/10 improving, reciprocal pattern with B HR with significant      Pt will be able to squat to retrieve item from ground without increase of pain or LOB, like picking up dropped item. [x] Met [] Not met [] Partially met Date: 12/1     Pt will have decreased swelling by 1/2 cm to improve knee flexion for comfortable sitting and less pain and stiffness.       [] Met [x] Not met [] Partially met Date: 11/10  midpatella L = + 1.5 cm compared to R

## 2023-12-04 ENCOUNTER — HOSPITAL ENCOUNTER (OUTPATIENT)
Facility: HOSPITAL | Age: 78
Setting detail: RECURRING SERIES
Discharge: HOME | End: 2023-12-07
Payer: MEDICARE

## 2023-12-04 PROCEDURE — 97110 THERAPEUTIC EXERCISES: CPT

## 2023-12-04 NOTE — THERAPY RECERTIFICATION
analyze and cue for proper movement patterns, and analyze and modify for postural abnormalities to address functional deficits and attain remaining goals. If you have any questions/comments please contact us directly at [phone]. Thank you for allowing us to assist in the care of your patient. Certification Period: 12/4/23 - 3/3/24      Rina Crandall, PT       12/4/2023       5:20 PM      ___ I have read the above report and request that my patient continue as recommended.   ___ I have read the above report and request that my patient continue therapy with the following changes/special instructions: ________________________________________________   ___ I have read the above report and request that my patient be discharged from therapy. Physician's Signature:_________________________   DATE:_________   TIME:________                           Akira Pereyra MD    ** Signature, Date and Time must be completed for valid certification **  Please sign and fax to [phone].   Thank you

## 2023-12-04 NOTE — PROGRESS NOTES
Caterina Cisneros (:  1945) is a Established patient, evaluated via telephone on 2023    [facility] [address], [address] [address]  Dept: [phone]  Dept Fax: [phone]   Chief Complaint   Patient presents with    Post-Op Check    Knee Pain     Patient-Reported Vitals  No data recorded   No Known Allergies  Current Outpatient Medications   Medication Sig Dispense Refill    NONFORMULARY 1 capsule daily SUPER SUPPLEMENT FROM NATURE SUNSHINE      ibuprofen (ADVIL;MOTRIN) 200 MG tablet Take 1 tablet by mouth every 6 hours as needed for Pain      Psyllium (METAMUCIL) 48.57 % POWD Take by mouth daily      Calcium Carbonate-Vitamin D 600-3. 125 MG-MCG TABS Take 600 mg by mouth daily      fluticasone (FLONASE) 50 MCG/ACT nasal spray 2 sprays by Nasal route as needed      montelukast (SINGULAIR) 10 MG tablet Take 1 tablet by mouth as needed       No current facility-administered medications for this visit.       Patient Active Problem List   Diagnosis    Diverticulosis    Abnormal breast thermography    Onychomycosis    Spigelian hernia with bowel obstruction    Hyperlipidemia    Right inguinal hernia    Radial scar of breast    Asymptomatic gallstones    Pneumonia      Family History   Problem Relation Age of Onset    Heart Disease Father     Cancer Father         Brain tumor    Cancer Mother         Tumor behind eye      Social History     Socioeconomic History    Marital status:    Tobacco Use    Smoking status: Never    Smokeless tobacco: Never   Substance and Sexual Activity    Alcohol use: Never     Alcohol/week: 1.0 standard drink of alcohol    Drug use: Never     Social Determinants of Health     Financial Resource Strain: Low Risk  (2023)    Overall Financial Resource Strain (CARDIA)     Difficulty of Paying Living Expenses: Not hard at all   Transportation Needs: Unknown (2023)

## 2023-12-04 NOTE — PROGRESS NOTES
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 8-22 min = 1 unit; 23-37 min = 2 units; 38-52 min = 3 units; 53-67 min = 4 units; 68-82 min = 5 units            SUBJECTIVE    Pain Level (0-10 scale): 0/10    Any medication changes, allergies to medications, adverse drug reactions, diagnosis change, or new procedure performed?: [x] No    [] Yes (see summary sheet for update)  Medications: Verified on Patient Summary List    Subjective functional status/changes:   Patient has been seen a total of 18 visits since [facility]. She reports 80% improvement. She believes she is limping on L LE because she is still favoring it. She still has swelling  and along medial aspect of L knee . She also complains of pain along lateral aspect of L LE. Patient stated she just wants to walk normal.  She states she has gone outside in the yard but uses her cane. She was able to ambulate without her cane in the community this past weekend but kept her cane in the car. She is able to ascend and descend the stairs using reciprocal pattern but needs 1 handrail for safety. OBJECTIVE      Therapeutic Procedures:   Tx Min Billable or 1:1 Min (if diff from Tx Min) Procedure, Rationale, Specifics   44  32784 Therapeutic Exercise (timed):  increase ROM, strength, coordination, balance, and

## 2023-12-06 ENCOUNTER — APPOINTMENT (OUTPATIENT)
Facility: HOSPITAL | Age: 78
End: 2023-12-06
Payer: MEDICARE

## 2023-12-06 ENCOUNTER — TELEMEDICINE (OUTPATIENT)
Age: 78
End: 2023-12-06

## 2023-12-06 DIAGNOSIS — M25.562 LEFT KNEE PAIN, UNSPECIFIED CHRONICITY: ICD-10-CM

## 2023-12-06 DIAGNOSIS — Z96.652 STATUS POST TOTAL KNEE REPLACEMENT, LEFT: Primary | ICD-10-CM

## 2023-12-08 ENCOUNTER — HOSPITAL ENCOUNTER (OUTPATIENT)
Facility: HOSPITAL | Age: 78
Setting detail: RECURRING SERIES
Discharge: HOME | End: 2023-12-11
Payer: MEDICARE

## 2023-12-08 PROCEDURE — 97112 NEUROMUSCULAR REEDUCATION: CPT | Performed by: PHYSICAL THERAPIST

## 2023-12-08 PROCEDURE — 97110 THERAPEUTIC EXERCISES: CPT | Performed by: PHYSICAL THERAPIST

## 2023-12-08 NOTE — PROGRESS NOTES
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 8-22 min = 1 unit; 23-37 min = 2 units; 38-52 min = 3 units; 53-67 min = 4 units; 68-82 min = 5 units            SUBJECTIVE    Pain Level (0-10 scale): 0/10    Any medication changes, allergies to medications, adverse drug reactions, diagnosis change, or new procedure performed?: [x] No    [] Yes (see summary sheet for update)  Medications: Verified on Patient Summary List    Subjective functional status/changes:   Patient presents reporting she walked in without using her cane but carried it in just in case she limps. Reports feeling more confident walking in the grass and was able to leave cane in the house. OBJECTIVE      Therapeutic Procedures: Tx Min Billable or 1:1 Min (if diff from Tx Min) Procedure, Rationale, Specifics   40  08881 Therapeutic Exercise (timed):  increase ROM, strength, coordination, balance, and proprioception to improve patient's ability to progress to PLOF and address remaining functional goals. (see flow sheet as applicable)     Details if applicable:     -  98230 Manual Therapy (timed):  increase ROM and decrease edema to improve patient's ability to progress to PLOF and address remaining functional goals.   The manual therapy interventions were performed at a separate and distinct

## 2023-12-11 ENCOUNTER — HOSPITAL ENCOUNTER (OUTPATIENT)
Facility: HOSPITAL | Age: 78
Setting detail: RECURRING SERIES
Discharge: HOME | End: 2023-12-14
Payer: MEDICARE

## 2023-12-11 PROCEDURE — 97110 THERAPEUTIC EXERCISES: CPT

## 2023-12-11 PROCEDURE — 97112 NEUROMUSCULAR REEDUCATION: CPT

## 2023-12-11 NOTE — PROGRESS NOTES
[x] Met [] Not met [] Partially met  Date:    0-90 deg knee AROM to improve L knee mobility. [x] Met [] Not met [] Partially met  Date: 12/4 125    Pt will be able to perform a SLR without extensor lag for greater ease with lifting LE onto/off bed. [x] Met [] Not met [] Partially met  Date: 11/10     Long Term Goals: To be accomplished in 18 treatments. Pt will have improved AMPAC score by 5%. [x] Met [] Not met [] Partially met Date:11/10      Pt can ambulate community distances without an assistive device and without knee pain/instability to get groceries. [] Met [] Not met [x] Partially met Date: 12/4 Patient went out just recently without her cane for the first time. She reports she still feels unsteady. Pt can get up after sitting for >/= 30 minutes without difficulty, stiffness, or stumbling for safe transfers. [] Met [] Not met [x] Partially met Date:12/4 just stiffness now      Pt can negotiate uneven terrain without an assistive device without knee pain or instability, like walking through grass. [] Met [x] Not met [] Partially met  Date:12/4 still uses an AD to ambulate in the yard     Pt will have sufficient LE strength to go up and down steps with 1-2 rail support without difficulty or fatigue for gait safety. [x] Met [] Not met [] Partially met Date: 12/4     Pt will be able to squat to retrieve item from ground without increase of pain or LOB, like picking up dropped item. [x] Met [] Not met [] Partially met Date: 12/1     Pt will have decreased swelling by 1/2 cm to improve knee flexion for comfortable sitting and less pain and stiffness. [] Met [x] Not met [] Partially met Date: 12/4  midpatella L = +2 cm compared to R     Pt will have quad strength to perform SLS for 10-15 seconds for safe stance phase of gait without buckling.       [] Met [x] Not met [] Partially met Date: 12/4 Patient  could only L 6 sec R 8 sec  with several trials  11/10 2

## 2023-12-15 ENCOUNTER — APPOINTMENT (OUTPATIENT)
Facility: HOSPITAL | Age: 78
End: 2023-12-15
Payer: MEDICARE

## 2023-12-22 ENCOUNTER — APPOINTMENT (OUTPATIENT)
Facility: HOSPITAL | Age: 78
End: 2023-12-22
Payer: MEDICARE

## 2024-03-05 ENCOUNTER — TELEPHONE (OUTPATIENT)
Age: 79
End: 2024-03-05

## 2024-03-05 DIAGNOSIS — Z96.652 STATUS POST TOTAL KNEE REPLACEMENT, LEFT: Primary | ICD-10-CM

## 2024-03-05 RX ORDER — CEPHALEXIN 500 MG/1
500 CAPSULE ORAL
Qty: 4 CAPSULE | Refills: 3 | Status: SHIPPED | OUTPATIENT
Start: 2024-03-05 | End: 2024-03-05

## 2024-03-05 NOTE — TELEPHONE ENCOUNTER
Patient called in requesting antibiotics for dental procedure.      Surgery: LT TKR 10/18/2023    Pharmacy: [facility] [id number] - P [phone] - F [phone] [[id number]]

## 2024-04-08 ENCOUNTER — OFFICE VISIT (OUTPATIENT)
Age: 79
End: 2024-04-08
Payer: MEDICARE

## 2024-04-08 VITALS
DIASTOLIC BLOOD PRESSURE: 79 MMHG | BODY MASS INDEX: 28.66 KG/M2 | HEIGHT: 65 IN | WEIGHT: 172 LBS | TEMPERATURE: 97.1 F | SYSTOLIC BLOOD PRESSURE: 145 MMHG | RESPIRATION RATE: 16 BRPM | OXYGEN SATURATION: 99 % | HEART RATE: 72 BPM

## 2024-04-08 DIAGNOSIS — L60.3 ONYCHODYSTROPHY: Primary | ICD-10-CM

## 2024-04-08 PROCEDURE — G8419 CALC BMI OUT NRM PARAM NOF/U: HCPCS | Performed by: PODIATRIST

## 2024-04-08 PROCEDURE — 1090F PRES/ABSN URINE INCON ASSESS: CPT | Performed by: PODIATRIST

## 2024-04-08 PROCEDURE — 1036F TOBACCO NON-USER: CPT | Performed by: PODIATRIST

## 2024-04-08 PROCEDURE — G8427 DOCREV CUR MEDS BY ELIG CLIN: HCPCS | Performed by: PODIATRIST

## 2024-04-08 PROCEDURE — G8399 PT W/DXA RESULTS DOCUMENT: HCPCS | Performed by: PODIATRIST

## 2024-04-08 PROCEDURE — 99213 OFFICE O/P EST LOW 20 MIN: CPT | Performed by: PODIATRIST

## 2024-04-08 PROCEDURE — 1123F ACP DISCUSS/DSCN MKR DOCD: CPT | Performed by: PODIATRIST

## 2024-04-08 ASSESSMENT — PATIENT HEALTH QUESTIONNAIRE - PHQ9
SUM OF ALL RESPONSES TO PHQ QUESTIONS 1-9: 0
SUM OF ALL RESPONSES TO PHQ9 QUESTIONS 1 & 2: 0
SUM OF ALL RESPONSES TO PHQ QUESTIONS 1-9: 0
2. FEELING DOWN, DEPRESSED OR HOPELESS: NOT AT ALL
1. LITTLE INTEREST OR PLEASURE IN DOING THINGS: NOT AT ALL
SUM OF ALL RESPONSES TO PHQ QUESTIONS 1-9: 0
SUM OF ALL RESPONSES TO PHQ QUESTIONS 1-9: 0

## 2024-04-08 NOTE — PROGRESS NOTES
Chief Complaint   Patient presents with    Follow-up     BP (!) 145/79   Pulse 72   Temp 97.1 °F (36.2 °C)   Resp 16   Ht 1.651 m (5' 5\")   Wt 78 kg (172 lb)   SpO2 99%   BMI 28.62 kg/m²   1. Have you been to the ER, urgent care clinic since your last visit?  Hospitalized since your last visit?No    2. Have you seen or consulted any other health care providers outside of the [facility] since your last visit?  Include any pap smears or colon screening. No

## 2024-04-08 NOTE — PROGRESS NOTES
[facility] PODIATRY & FOOT SURGERY          Subjective:              Patient is a 78 y.o. female who is being seen as a returning pt for thick/discolored toenails.  Denies any pain, trauma or breaks in the skin. She denies any other lower extremity complaints          Past Medical History:        Diagnosis                     Past Surgical History:        Procedure                                                                          Family History         Problem                                                           Social History            Tobacco Use                                                 No Known Allergies       Prior to Admission medications            Medication           montelukast (Singulair) 10 mg tablet    diclofenac (VOLTAREN) 1 % gel           magnesium 250 mg tab           Review of Systems    Constitutional: Negative.     HENT: Negative.      Eyes: Negative.     Respiratory: Negative.      Cardiovascular: Negative.     Gastrointestinal: Negative.     Endocrine: Negative.     Genitourinary: Negative.     Musculoskeletal: Negative.     Skin: Negative.     Allergic/Immunologic: Negative.     Neurological: Negative.     Hematological: Negative.     Psychiatric/Behavioral: Negative.      All other systems reviewed and are negative.          Objective:        Visit Vitals        BP  (!) 169/71 (BP 1 Location: Right upper arm, BP Patient Position: Sitting, BP Cuff Size: Adult)     Pulse  71     Temp  96.8 °F (36 °C) (Temporal)     Ht  5' 5\" (1.651 m)     Wt  172 lb 9.6 oz (78.3 kg)     SpO2  98%        BMI           Physical Exam   Vitals reviewed.    Constitutional:        Appearance: She is overweight.     Cardiovascular:       Pulses:            Dorsalis pedis pulses are 2+ on the right side and 2+  on the left side.         Posterior tibial pulses are 2+ on the right side and 2+  on the left side.    Pulmonary:       Effort: Pulmonary effort is normal.

## 2024-04-22 NOTE — TELEPHONE ENCOUNTER
Patient called requesting a refill for the following medication:      montelukast (SINGULAIR) 10 MG tablet         Please send to [facility]

## 2024-04-23 RX ORDER — MONTELUKAST SODIUM 10 MG/1
10 TABLET ORAL NIGHTLY PRN
Qty: 60 TABLET | Refills: 0 | Status: SHIPPED | OUTPATIENT
Start: 2024-04-23

## 2024-06-10 ENCOUNTER — TELEPHONE (OUTPATIENT)
Facility: CLINIC | Age: 79
End: 2024-06-10

## 2024-06-10 NOTE — TELEPHONE ENCOUNTER
----- Message from Brigette Banerjeeford sent at 6/10/2024  9:55 AM EDT -----  Subject: Referral Request    Reason for referral request? patient would like to have blood work drawn   before her appointment on 06/17 please call patient with an appointment   once order is in chart   Provider patient wants to be referred to(if known):     Provider Phone Number(if known):    Additional Information for Provider?   ---------------------------------------------------------------------------  --------------  CALL BACK INFO    [phone]; OK to leave message on voicemail  ---------------------------------------------------------------------------  --------------

## 2024-06-14 SDOH — HEALTH STABILITY: PHYSICAL HEALTH: ON AVERAGE, HOW MANY MINUTES DO YOU ENGAGE IN EXERCISE AT THIS LEVEL?: 20 MIN

## 2024-06-17 ENCOUNTER — OFFICE VISIT (OUTPATIENT)
Facility: CLINIC | Age: 79
End: 2024-06-17
Payer: MEDICARE

## 2024-06-17 VITALS
TEMPERATURE: 97.8 F | HEART RATE: 64 BPM | OXYGEN SATURATION: 97 % | BODY MASS INDEX: 28.66 KG/M2 | RESPIRATION RATE: 16 BRPM | WEIGHT: 172 LBS | DIASTOLIC BLOOD PRESSURE: 76 MMHG | HEIGHT: 65 IN | SYSTOLIC BLOOD PRESSURE: 124 MMHG

## 2024-06-17 DIAGNOSIS — E66.3 OVERWEIGHT WITH BODY MASS INDEX (BMI) OF 28 TO 28.9 IN ADULT: ICD-10-CM

## 2024-06-17 DIAGNOSIS — J30.9 ALLERGIC RHINITIS, UNSPECIFIED SEASONALITY, UNSPECIFIED TRIGGER: ICD-10-CM

## 2024-06-17 DIAGNOSIS — Z76.89 ENCOUNTER TO ESTABLISH CARE: Primary | ICD-10-CM

## 2024-06-17 DIAGNOSIS — E78.00 PURE HYPERCHOLESTEROLEMIA, UNSPECIFIED: ICD-10-CM

## 2024-06-17 PROCEDURE — 1123F ACP DISCUSS/DSCN MKR DOCD: CPT

## 2024-06-17 PROCEDURE — 99214 OFFICE O/P EST MOD 30 MIN: CPT

## 2024-06-17 RX ORDER — ACETAMINOPHEN 500 MG
500 TABLET ORAL EVERY 6 HOURS PRN
COMMUNITY

## 2024-06-17 RX ORDER — IBUPROFEN 200 MG
200 TABLET ORAL EVERY 6 HOURS PRN
COMMUNITY

## 2024-06-17 RX ORDER — FLUTICASONE PROPIONATE 50 MCG
1 SPRAY, SUSPENSION (ML) NASAL DAILY
COMMUNITY

## 2024-06-17 RX ORDER — MONTELUKAST SODIUM 10 MG/1
10 TABLET ORAL NIGHTLY PRN
Qty: 90 TABLET | Refills: 1 | Status: SHIPPED | OUTPATIENT
Start: 2024-06-17

## 2024-06-17 ASSESSMENT — ENCOUNTER SYMPTOMS
DIARRHEA: 0
SORE THROAT: 0
COUGH: 0
ABDOMINAL PAIN: 0
EYE PAIN: 0
BLOOD IN STOOL: 0
PHOTOPHOBIA: 0
WHEEZING: 0
VOMITING: 0
NAUSEA: 0
CONSTIPATION: 0
CHEST TIGHTNESS: 0
SHORTNESS OF BREATH: 0
TROUBLE SWALLOWING: 0
BACK PAIN: 0

## 2024-06-17 NOTE — PROGRESS NOTES
Whitney Cooley  78 y.o. female  1945  [address] [address] [address]  [id number]     [facility] INTERNAL MEDICINE: Progress Note       Encounter Date: 6/17/2024    Patient presents with the following chief complaint(s)    Chief Complaint   Patient presents with    Established New Doctor    Hyperlipidemia        History provided by patient    Assessment and Plan:   1. Encounter to establish care  2. Allergic rhinitis, unspecified seasonality, unspecified trigger  Comments:  Refill singular today  Orders:  -     montelukast (SINGULAIR) 10 MG tablet; Take 1 tablet by mouth nightly as needed (allergies), Disp-90 tablet, R-1Normal  3. Pure hypercholesterolemia, unspecified  Comments:  Discussed importance of diet & activity  Orders:  -     Lipid Panel; Future  4. Overweight with body mass index (BMI) of 28 to 28.9 in adult  Comments:  Discussed importance of diet & activity  Orders:  -     CBC; Future  -     Comprehensive Metabolic Panel; Future     Return in about 3 months (around 9/17/2024) for [facility].  History of Present Illness   Whitney Cooley is a 78 y.o. female with past medical history listed, who presents to clinic today as a new patient to me for a follow up visit.     Patient was previously seen by Dr. Alonzo.    Patient has history of hyperlipidemia.  She is not currently on any medications. Generally well-balanced diet. About 4 bottles of water/day. She is not exercising regularly, but is active. She works at the [employer].    Patient has history of allergies for which she is using Singulair 10 daily.  She is requesting a refill today.    She is under care of podiatry Dr. Watkins for her footcare    She is due for [facility]    Health Maintenance  Health Maintenance Due   Topic Date Due    DTaP/Tdap/Td vaccine (1 - Tdap) Never done    Respiratory Syncytial Virus (RSV) Pregnant or age 60 yrs+ (1 - 1-dose 60+ series) Never done    Annual Wellness Visit (Medicare)  05/04/2024

## 2024-06-17 NOTE — PROGRESS NOTES
\"Have you been to the ER, urgent care clinic since your last visit?  Hospitalized since your last visit?\"    NO    “Have you seen or consulted any other health care providers outside of [facility] since your last visit?”    NO      Chief Complaint   Patient presents with    Established New Doctor    Hyperlipidemia     BP (!) 147/75 (Site: Right Upper Arm, Position: Sitting, Cuff Size: Medium Adult)   Pulse 64   Temp 97.8 °F (36.6 °C) (Oral)   Resp 16   Ht 1.651 m (5' 5\")   Wt 78 kg (172 lb)   SpO2 97%   BMI 28.62 kg/m²           Click Here for Release of Records Request

## 2024-06-18 LAB
ALBUMIN SERPL-MCNC: 4.3 G/DL (ref 3.8–4.8)
ALP SERPL-CCNC: 84 IU/L (ref 44–121)
ALT SERPL-CCNC: 14 IU/L (ref 0–32)
AST SERPL-CCNC: 19 IU/L (ref 0–40)
BILIRUB SERPL-MCNC: 0.5 MG/DL (ref 0–1.2)
BUN SERPL-MCNC: 16 MG/DL (ref 8–27)
BUN/CREAT SERPL: 28 (ref 12–28)
CALCIUM SERPL-MCNC: 9.5 MG/DL (ref 8.7–10.3)
CHLORIDE SERPL-SCNC: 105 MMOL/L (ref 96–106)
CHOLEST SERPL-MCNC: 205 MG/DL (ref 100–199)
CO2 SERPL-SCNC: 24 MMOL/L (ref 20–29)
CREAT SERPL-MCNC: 0.58 MG/DL (ref 0.57–1)
EGFRCR SERPLBLD CKD-EPI 2021: 93 ML/MIN/1.73
ERYTHROCYTE [DISTWIDTH] IN BLOOD BY AUTOMATED COUNT: 13.8 % (ref 11.7–15.4)
GLOBULIN SER CALC-MCNC: 2.7 G/DL (ref 1.5–4.5)
GLUCOSE SERPL-MCNC: 82 MG/DL (ref 70–99)
HCT VFR BLD AUTO: 38.7 % (ref 34–46.6)
HDLC SERPL-MCNC: 57 MG/DL
HGB BLD-MCNC: 12.2 G/DL (ref 11.1–15.9)
LDLC SERPL CALC-MCNC: 134 MG/DL (ref 0–99)
MCH RBC QN AUTO: 26.4 PG (ref 26.6–33)
MCHC RBC AUTO-ENTMCNC: 31.5 G/DL (ref 31.5–35.7)
MCV RBC AUTO: 84 FL (ref 79–97)
PLATELET # BLD AUTO: 231 X10E3/UL (ref 150–450)
POTASSIUM SERPL-SCNC: 5.1 MMOL/L (ref 3.5–5.2)
PROT SERPL-MCNC: 7 G/DL (ref 6–8.5)
RBC # BLD AUTO: 4.62 X10E6/UL (ref 3.77–5.28)
SODIUM SERPL-SCNC: 141 MMOL/L (ref 134–144)
TRIGL SERPL-MCNC: 79 MG/DL (ref 0–149)
VLDLC SERPL CALC-MCNC: 14 MG/DL (ref 5–40)
WBC # BLD AUTO: 4.9 X10E3/UL (ref 3.4–10.8)

## 2024-07-11 ENCOUNTER — APPOINTMENT (OUTPATIENT)
Facility: HOSPITAL | Age: 79
End: 2024-07-11
Payer: MEDICARE

## 2024-07-11 ENCOUNTER — HOSPITAL ENCOUNTER (EMERGENCY)
Facility: HOSPITAL | Age: 79
Discharge: HOME | End: 2024-07-11
Attending: STUDENT IN AN ORGANIZED HEALTH CARE EDUCATION/TRAINING PROGRAM
Payer: MEDICARE

## 2024-07-11 VITALS
HEART RATE: 84 BPM | OXYGEN SATURATION: 97 % | HEIGHT: 65 IN | RESPIRATION RATE: 17 BRPM | DIASTOLIC BLOOD PRESSURE: 70 MMHG | WEIGHT: 170 LBS | BODY MASS INDEX: 28.32 KG/M2 | SYSTOLIC BLOOD PRESSURE: 129 MMHG | TEMPERATURE: 98.3 F

## 2024-07-11 DIAGNOSIS — R05.1 ACUTE COUGH: ICD-10-CM

## 2024-07-11 DIAGNOSIS — R19.7 DIARRHEA, UNSPECIFIED TYPE: ICD-10-CM

## 2024-07-11 DIAGNOSIS — U07.1 COVID: Primary | ICD-10-CM

## 2024-07-11 LAB
FLUAV RNA SPEC QL NAA+PROBE: NOT DETECTED
FLUBV RNA SPEC QL NAA+PROBE: NOT DETECTED
SARS-COV-2 RNA RESP QL NAA+PROBE: DETECTED

## 2024-07-11 PROCEDURE — 99284 EMERGENCY DEPT VISIT MOD MDM: CPT

## 2024-07-11 PROCEDURE — 71046 X-RAY EXAM CHEST 2 VIEWS: CPT

## 2024-07-11 PROCEDURE — 87636 SARSCOV2 & INF A&B AMP PRB: CPT

## 2024-07-11 RX ORDER — GUAIFENESIN/DEXTROMETHORPHAN 100-10MG/5
5 SYRUP ORAL 3 TIMES DAILY PRN
Qty: 120 ML | Refills: 0 | Status: SHIPPED | OUTPATIENT
Start: 2024-07-11 | End: 2024-07-21

## 2024-07-11 RX ORDER — LOPERAMIDE HYDROCHLORIDE 2 MG/1
2 CAPSULE ORAL 4 TIMES DAILY PRN
Qty: 24 CAPSULE | Refills: 0 | Status: SHIPPED | OUTPATIENT
Start: 2024-07-11 | End: 2024-07-21

## 2024-07-11 ASSESSMENT — PAIN - FUNCTIONAL ASSESSMENT: PAIN_FUNCTIONAL_ASSESSMENT: NONE - DENIES PAIN

## 2024-07-11 ASSESSMENT — LIFESTYLE VARIABLES
HOW OFTEN DO YOU HAVE A DRINK CONTAINING ALCOHOL: MONTHLY OR LESS
HOW MANY STANDARD DRINKS CONTAINING ALCOHOL DO YOU HAVE ON A TYPICAL DAY: 1 OR 2

## 2024-07-11 NOTE — ED TRIAGE NOTES
Pt has had a productive cough with clear phlegm, fever, and diarrhea for 2 days. Pt last dose of 1300 mg tylenol was at 0800. Pt denies abdominal pain; states she had a mild headache yesterday but not today.

## 2024-07-11 NOTE — ED PROVIDER NOTES
[facility] EMERGENCY DEPT  EMERGENCY DEPARTMENT HISTORY AND PHYSICAL EXAM      Date: 7/11/2024  Patient Name: Whitney Cooley  MRN: [id number]  YOB: 1945  Date of evaluation: 7/11/2024  Provider: Landon Staley PA-C   Note Started: 11:48 AM EDT 7/11/24    HISTORY OF PRESENT ILLNESS     Chief Complaint   Patient presents with    Cough    Diarrhea       History Provided By: Patient    HPI: Whitney Cooley is a 78 y.o. female with a past medical history of hypertension, hyperlipidemia and osteoarthritis presents with complaint of nasal congestion, productive cough, fever x 2 days.  Patient states she has been taking acetaminophen which has improved her temperature.  Denies any nausea, vomiting but states she has had several bouts of diarrhea.  Denies any cramping or abdominal pain associated with the diarrhea.  States she has had 2 bouts so far today of loose stools.    PAST MEDICAL HISTORY   Past Medical History:  Past Medical History:   Diagnosis Date    Arthritis     High cholesterol        Past Surgical History:  Past Surgical History:   Procedure Laterality Date    BREAST BIOPSY Left 10/28/2020    Left Breast Wire Localized Excisional Biopsy performed by Kristin Pichardo MD at [facility] MAIN OR    BREAST BIOPSY      COLONOSCOPY      GYN      hysterectomy    HERNIA REPAIR      HERNIA REPAIR      HERNIA REPAIR  07/18/2021    ORTHOPEDIC SURGERY      knee       Family History:  Family History   Problem Relation Age of Onset    Cancer Mother         Tumor behind eye    Heart Disease Father     Cancer Father         Brain tumor    Diabetes Sister     Diabetes Brother        Social History:  Social History     Tobacco Use    Smoking status: Never    Smokeless tobacco: Never   Vaping Use    Vaping Use: Never used   Substance Use Topics    Alcohol use: Yes     Alcohol/week: 1.0 standard drink of alcohol     Types: 1 Standard drinks or equivalent per week     Comment: occasional    Drug use: Never       Allergies:  No Known

## 2024-07-26 ENCOUNTER — HOSPITAL ENCOUNTER (EMERGENCY)
Facility: HOSPITAL | Age: 79
Discharge: HOME | End: 2024-07-26
Payer: MEDICARE

## 2024-07-26 VITALS
OXYGEN SATURATION: 97 % | SYSTOLIC BLOOD PRESSURE: 149 MMHG | TEMPERATURE: 98.5 F | RESPIRATION RATE: 18 BRPM | BODY MASS INDEX: 28.32 KG/M2 | HEART RATE: 85 BPM | WEIGHT: 170 LBS | HEIGHT: 65 IN | DIASTOLIC BLOOD PRESSURE: 67 MMHG

## 2024-07-26 DIAGNOSIS — U07.1 COVID-19: Primary | ICD-10-CM

## 2024-07-26 LAB
SARS-COV-2 RNA RESP QL NAA+PROBE: DETECTED
SPECIMEN SOURCE: NORMAL

## 2024-07-26 PROCEDURE — 99283 EMERGENCY DEPT VISIT LOW MDM: CPT

## 2024-07-26 PROCEDURE — 87635 SARS-COV-2 COVID-19 AMP PRB: CPT

## 2024-07-26 RX ORDER — IBUPROFEN 400 MG/1
400 TABLET ORAL 4 TIMES DAILY PRN
Qty: 20 TABLET | Refills: 0 | Status: SHIPPED | OUTPATIENT
Start: 2024-07-26

## 2024-07-26 RX ORDER — ACETAMINOPHEN 500 MG
1000 TABLET ORAL 4 TIMES DAILY PRN
Qty: 40 TABLET | Refills: 0 | Status: SHIPPED | OUTPATIENT
Start: 2024-07-26

## 2024-07-26 NOTE — ED TRIAGE NOTES
Pt here with  for covid testing. Seen here last week with  tested negative for covid at that time, today tested positive at home. Requesting testing.

## 2024-07-26 NOTE — ED PROVIDER NOTES
or equivalent per week     Comment: occasional    Drug use: Never       Allergies:  No Known Allergies    PCP: Ana Arango PA-C    Current Meds:   No current facility-administered medications for this encounter.     Current Outpatient Medications   Medication Sig Dispense Refill    acetaminophen (TYLENOL) 500 MG tablet Take 2 tablets by mouth 4 times daily as needed for Pain 40 tablet 0    ibuprofen (ADVIL;MOTRIN) 400 MG tablet Take 1 tablet by mouth 4 times daily as needed for Pain 20 tablet 0    montelukast (SINGULAIR) 10 MG tablet Take 1 tablet by mouth nightly as needed (allergies) 90 tablet 1    MAGNESIUM PO Take 1 tablet by mouth daily      CALCIUM-VITAMIN D PO Take by mouth      ECHINACEA-ARCHIBALD SEAL PO Take by mouth      NONFORMULARY Seasonal allergy herbal supplement, sinus support, super supplement      fluticasone (FLONASE) 50 MCG/ACT nasal spray 1 spray by Each Nostril route daily         Social Determinants of Health:   Social Determinants of Health     Tobacco Use: Low Risk  (7/26/2024)    Patient History     Smoking Tobacco Use: Never     Smokeless Tobacco Use: Never     Passive Exposure: Not on file   Alcohol Use: Not At Risk (7/11/2024)    AUDIT-C     Frequency of Alcohol Consumption: Monthly or less     Average Number of Drinks: 1 or 2     Frequency of Binge Drinking: Never   Financial Resource Strain: Low Risk  (7/17/2023)    Overall Financial Resource Strain (CARDIA)     Difficulty of Paying Living Expenses: Not hard at all   Food Insecurity: Not on file (7/17/2023)   Transportation Needs: Unknown (7/17/2023)    PRAPARE - Transportation     Lack of Transportation (Medical): Not on file     Lack of Transportation (Non-Medical): No   Physical Activity: Unknown (6/14/2024)    Exercise Vital Sign     Days of Exercise per Week: Not on file     Minutes of Exercise per Session: 20 min   Stress: Not on file   Social Connections: Not on file   Intimate Partner Violence: Not At Risk (6/21/2023)

## 2024-08-12 ENCOUNTER — TELEPHONE (OUTPATIENT)
Facility: CLINIC | Age: 79
End: 2024-08-12

## 2024-09-23 ENCOUNTER — OFFICE VISIT (OUTPATIENT)
Facility: CLINIC | Age: 79
End: 2024-09-23
Payer: MEDICARE

## 2024-09-23 VITALS
WEIGHT: 175 LBS | HEIGHT: 65 IN | RESPIRATION RATE: 16 BRPM | DIASTOLIC BLOOD PRESSURE: 80 MMHG | HEART RATE: 68 BPM | OXYGEN SATURATION: 93 % | SYSTOLIC BLOOD PRESSURE: 144 MMHG | BODY MASS INDEX: 29.16 KG/M2 | TEMPERATURE: 98.2 F

## 2024-09-23 DIAGNOSIS — R03.0 ELEVATED BLOOD PRESSURE READING: ICD-10-CM

## 2024-09-23 DIAGNOSIS — Z00.00 MEDICARE ANNUAL WELLNESS VISIT, SUBSEQUENT: Primary | ICD-10-CM

## 2024-09-23 DIAGNOSIS — E78.00 PURE HYPERCHOLESTEROLEMIA, UNSPECIFIED: ICD-10-CM

## 2024-09-23 PROCEDURE — 1123F ACP DISCUSS/DSCN MKR DOCD: CPT

## 2024-09-23 PROCEDURE — 99213 OFFICE O/P EST LOW 20 MIN: CPT

## 2024-09-23 PROCEDURE — G0439 PPPS, SUBSEQ VISIT: HCPCS

## 2024-09-23 SDOH — ECONOMIC STABILITY: FOOD INSECURITY: WITHIN THE PAST 12 MONTHS, THE FOOD YOU BOUGHT JUST DIDN'T LAST AND YOU DIDN'T HAVE MONEY TO GET MORE.: NEVER TRUE

## 2024-09-23 SDOH — ECONOMIC STABILITY: FOOD INSECURITY: WITHIN THE PAST 12 MONTHS, YOU WORRIED THAT YOUR FOOD WOULD RUN OUT BEFORE YOU GOT MONEY TO BUY MORE.: NEVER TRUE

## 2024-09-23 SDOH — ECONOMIC STABILITY: INCOME INSECURITY: HOW HARD IS IT FOR YOU TO PAY FOR THE VERY BASICS LIKE FOOD, HOUSING, MEDICAL CARE, AND HEATING?: NOT HARD AT ALL

## 2024-09-23 ASSESSMENT — PATIENT HEALTH QUESTIONNAIRE - PHQ9
2. FEELING DOWN, DEPRESSED OR HOPELESS: NOT AT ALL
SUM OF ALL RESPONSES TO PHQ QUESTIONS 1-9: 0
SUM OF ALL RESPONSES TO PHQ QUESTIONS 1-9: 0
SUM OF ALL RESPONSES TO PHQ9 QUESTIONS 1 & 2: 0
SUM OF ALL RESPONSES TO PHQ QUESTIONS 1-9: 0
1. LITTLE INTEREST OR PLEASURE IN DOING THINGS: NOT AT ALL
SUM OF ALL RESPONSES TO PHQ QUESTIONS 1-9: 0

## 2024-09-23 ASSESSMENT — LIFESTYLE VARIABLES
HOW OFTEN DO YOU HAVE A DRINK CONTAINING ALCOHOL: NEVER
HOW MANY STANDARD DRINKS CONTAINING ALCOHOL DO YOU HAVE ON A TYPICAL DAY: PATIENT DOES NOT DRINK

## 2024-11-26 NOTE — RESULT ENCOUNTER NOTE
Asking for MRI/ERCP results before the holiday, test done today    Please contact patient regarding labs results and recommendations. Thank you.     Dear Whitney Cooley,    Thank you for completing your lab work. I have reviewed your lab results and interpretations are as follows:    Cholesterol slightly elevated Diet recommendations for high cholesterol:  Avoid sugary drinks (i.e. sodas), eat only 1 starch per meal, portion should be not more than 1 cup, avoid bad fats that are in anything deep fried, fast foods, eat good fats that are in avocado, nuts, fish, seafood, olive oil, eat lean meats    Blood count liver and kidney function normal

## 2025-02-28 ENCOUNTER — TELEPHONE (OUTPATIENT)
Facility: CLINIC | Age: 80
End: 2025-02-28

## 2025-02-28 NOTE — TELEPHONE ENCOUNTER
Attempted to contact patient regarding upcoming Medicare wellness appointment and completion of HRA questionnaire. LVM for patient to please return call at [phone].

## 2025-03-01 SDOH — HEALTH STABILITY: PHYSICAL HEALTH
ON AVERAGE, HOW MANY DAYS PER WEEK DO YOU ENGAGE IN MODERATE TO STRENUOUS EXERCISE (LIKE A BRISK WALK)?: PATIENT DECLINED

## 2025-03-01 ASSESSMENT — LIFESTYLE VARIABLES
HOW OFTEN DO YOU HAVE A DRINK CONTAINING ALCOHOL: 98
HOW OFTEN DO YOU HAVE A DRINK CONTAINING ALCOHOL: PATIENT DECLINED
HOW MANY STANDARD DRINKS CONTAINING ALCOHOL DO YOU HAVE ON A TYPICAL DAY: 0
HOW MANY STANDARD DRINKS CONTAINING ALCOHOL DO YOU HAVE ON A TYPICAL DAY: PATIENT DOES NOT DRINK

## 2025-03-01 ASSESSMENT — PATIENT HEALTH QUESTIONNAIRE - PHQ9
1. LITTLE INTEREST OR PLEASURE IN DOING THINGS: SEVERAL DAYS
SUM OF ALL RESPONSES TO PHQ QUESTIONS 1-9: 1
2. FEELING DOWN, DEPRESSED OR HOPELESS: NOT AT ALL
SUM OF ALL RESPONSES TO PHQ QUESTIONS 1-9: 1

## 2025-03-03 ENCOUNTER — OFFICE VISIT (OUTPATIENT)
Facility: CLINIC | Age: 80
End: 2025-03-03

## 2025-03-03 VITALS
OXYGEN SATURATION: 99 % | SYSTOLIC BLOOD PRESSURE: 138 MMHG | BODY MASS INDEX: 29.82 KG/M2 | DIASTOLIC BLOOD PRESSURE: 60 MMHG | HEIGHT: 65 IN | WEIGHT: 179 LBS | HEART RATE: 78 BPM | RESPIRATION RATE: 16 BRPM | TEMPERATURE: 97.9 F

## 2025-03-03 DIAGNOSIS — Z00.00 MEDICARE ANNUAL WELLNESS VISIT, SUBSEQUENT: Primary | ICD-10-CM

## 2025-03-03 DIAGNOSIS — J30.9 ALLERGIC RHINITIS, UNSPECIFIED SEASONALITY, UNSPECIFIED TRIGGER: ICD-10-CM

## 2025-03-03 DIAGNOSIS — Z00.00 MEDICARE ANNUAL WELLNESS VISIT, SUBSEQUENT: ICD-10-CM

## 2025-03-03 RX ORDER — MONTELUKAST SODIUM 10 MG/1
10 TABLET ORAL NIGHTLY PRN
Qty: 90 TABLET | Refills: 1 | Status: SHIPPED | OUTPATIENT
Start: 2025-03-03

## 2025-03-03 SDOH — ECONOMIC STABILITY: FOOD INSECURITY: WITHIN THE PAST 12 MONTHS, THE FOOD YOU BOUGHT JUST DIDN'T LAST AND YOU DIDN'T HAVE MONEY TO GET MORE.: NEVER TRUE

## 2025-03-03 SDOH — ECONOMIC STABILITY: FOOD INSECURITY: WITHIN THE PAST 12 MONTHS, YOU WORRIED THAT YOUR FOOD WOULD RUN OUT BEFORE YOU GOT MONEY TO BUY MORE.: NEVER TRUE

## 2025-03-03 NOTE — PATIENT INSTRUCTIONS
A Healthy Heart: Care Instructions  Overview     Coronary artery disease, also called heart disease, occurs when a substance called plaque builds up in the vessels that supply oxygen-rich blood to your heart muscle. This can narrow the blood vessels and reduce blood flow. A heart attack happens when blood flow is completely blocked. A high-fat diet, smoking, and other factors increase the risk of heart disease.  Your doctor has found that you have a chance of having heart disease. A heart-healthy lifestyle can help keep your heart healthy and prevent heart disease. This lifestyle includes eating healthy, being active, staying at a weight that's healthy for you, and not smoking or using tobacco. It also includes taking medicines as directed, managing other health conditions, and trying to get a healthy amount of sleep.  Follow-up care is a key part of your treatment and safety. Be sure to make and go to all appointments, and call your doctor if you are having problems. It's also a good idea to know your test results and keep a list of the medicines you take.  How can you care for yourself at home?  Diet    Use less salt when you cook and eat. This helps lower your blood pressure. Taste food before salting. Add only a little salt when you think you need it. With time, your taste buds will adjust to less salt.     Eat fewer snack items, fast foods, canned soups, and other high-salt, high-fat, processed foods.     Read food labels and try to avoid saturated and trans fats. They increase your risk of heart disease by raising cholesterol levels.     Limit the amount of solid fat--butter, margarine, and shortening--you eat. Use olive, peanut, or canola oil when you cook. Bake, broil, and steam foods instead of frying them.     Eat a variety of fruit and vegetables every day. Dark green, deep orange, red, or yellow fruits and vegetables are especially good for you. Examples include spinach, carrots, peaches, and    REFERRING PHYSICIAN: Abigail Fisher MD    DATE:  12/21/2021    PREOPERATIVE DIAGNOSIS:  Right shoulder infected postoperative hematoma.    POSTOPERATIVE DIAGNOSIS:  Right shoulder infected postoperative hematoma.    OPERATIVE PROCEDURE:  Open incision and drainage with wide deep excisional debridement, left shoulder infected hematoma.    ASSISTANT:  YOJANA Amin.    ANESTHESIA:  General with interscalene nerve block.    EBL:  300 cc.    DRAINS:  One Hemovac drain.    OPERATIVE TECHNIQUE:  The patient is a 58-year-old obese white female who underwent left shoulder arthroscopy and biceps tenodesis with excision of significant subacromial bursitis, acromioplasty and distal clavicle excision several weeks ago.  She has aortic valve and is chronically on Coumadin.  She was put on Coumadin and bridged with Lovenox, immediately postoperative shoulder scope and her INR went up to 7.  This eventually was normalized, but she developed increased swelling, eventual redness and cellulitis.  She was recently admitted to [facility] because of low-grade temperature and white count of 13.  She has been on IV antibiotics since hospital admission to [facility] on 12/16/2021.  She has improved.  Her white count has normalized.  She is feeling less pain but on significant IV pain med still.  MRI shows a fluid collection in the shoulder with large amount of arthrofibrosis.  She presents for the above procedure.  Her Coumadin has stopped.  IV heparin supervised by Cardiology is used to bridge this.  Risks and benefits including, but not limited to, ongoing symptoms, need for further surgery, bleeding, need for transfusion, and even death.  After informed consent was obtained, surgical site marked and time-out called.  The patient was brought under adequate general endotracheal anesthesia with interscalene nerve block.  She was placed in a modified beach chair position in the left shoulder and arm sterilely prepped and  draped with ChloraPrep.  Arthroscopic wounds were well healed.  There was 1 area on the lateral deltoid that did have quite a bit of erythema and some induration, but no drainage.  A deltopectoral incision was made.  The interval was easily located and exposed.  Incredible amount of fibrotic tissue was present in the subacromial space.  The Kolbel retractors were placed across the coracobrachialis and deltoid and retractors superiorly.  Rongeur was used to remove the fibrotic tissue.  There was no mike pus, but there was swelling and erythema throughout.  Deep cultures were taken.  There was a small rent in the subscapularis and through this with subscapularis retractors, capsulotomy was performed.  No mike pus was evident.  Joint spaces were intact.  A Bankart retractor was placed.  Thorough irrigation of the joint was performed, capsule closed with #1 Vicryl.  Copious irrigation was performed with power irrigation.  Aricept aseptic solution was placed.  A medium Hemovac drain was placed from the joint through the deltoid.  The deltoid was closed with a running #1 Vicryl, the subcu with interrupted 2-0 Vicryl, and the skin with staples.  Aquacel dressing placed.  YOJANA Amin, assisted throughout the entire length of the procedure, helping hold deep retractors, close the skin and applied the dressing as well as well-padded sling.  This was a deep wide excisional debridement including the subacromial bursa and the greater tuberosity.  The tissue and surface measured 6 x 6 cm.  The patient tolerated the procedure well and was transferred to recovery room in stable condition.  The son was contacted on the phone and informed of operative findings and likelihood that this fibrotic tissue was even present prior to her 1st procedure and she may have bled intermittently into the shoulder area since aortic valve was placed several years ago as INR goes up and down.  Postoperative plan was explained as well.  She is  understanding, all questions answered.        Dictated By:  Dominguez Regalado MD        D:  12/21/2021 16:10:59  T:  12/21/2021 21:37:21  YAKELIN/thien  Job:  [id number]      cc:  Abigail Fisher MD

## 2025-03-03 NOTE — PROGRESS NOTES
\"Have you been to the ER, urgent care clinic since your last visit?  Hospitalized since your last visit?\"    NO    “Have you seen or consulted any other health care providers outside our system since your last visit?”    NO    Chief Complaint   Patient presents with    Medicare AWV     /60 (Site: Right Upper Arm, Position: Sitting, Cuff Size: Medium Adult)   Pulse 78   Temp 97.9 °F (36.6 °C) (Skin)   Resp 16   Ht 1.651 m (5' 5\")   Wt 81.2 kg (179 lb)   SpO2 99%   BMI 29.79 kg/m²

## 2025-03-03 NOTE — PROGRESS NOTES
Medicare Annual Wellness Visit    Whitney Cooley is here for Medicare AWV    Assessment & Plan   Medicare annual wellness visit, subsequent  -     CBC; Future  -     Comprehensive Metabolic Panel; Future  -     Lipid Panel; Future  Allergic rhinitis, unspecified seasonality, unspecified trigger  Comments:  Refill singular today  Orders:  -     montelukast (SINGULAIR) 10 MG tablet; Take 1 tablet by mouth nightly as needed (allergies), Disp-90 tablet, R-1Normal       Return in about 6 months (around 9/3/2025).     Subjective   The following acute and/or chronic problems were also addressed today:  Hx of allergies-requesting refill of Singular today.    Patient's complete Health Risk Assessment and screening values have been reviewed and are found in Flowsheets. The following problems were reviewed today and where indicated follow up appointments were made and/or referrals ordered.    No Positive Risk Factors identified today.                                Objective   Vitals:    03/03/25 1247   BP: 138/60   Site: Right Upper Arm   Position: Sitting   Cuff Size: Medium Adult   Pulse: 78   Resp: 16   Temp: 97.9 °F (36.6 °C)   TempSrc: Skin   SpO2: 99%   Weight: 81.2 kg (179 lb)   Height: 1.651 m (5' 5\")      Body mass index is 29.79 kg/m².        General Appearance: alert and oriented to person, place and time, well-developed and well-nourished, in no acute distress  Skin: warm and dry, no rash or erythema  Head: normocephalic and atraumatic  Pulmonary/Chest: clear to auscultation bilaterally- no wheezes, rales or rhonchi, normal air movement, no respiratory distress  Cardiovascular: normal rate, regular rhythm, and no murmurs  Neurologic: gait and coordination normal and speech normal          No Known Allergies  Prior to Visit Medications    Medication Sig Taking? Authorizing Provider   montelukast (SINGULAIR) 10 MG tablet Take 1 tablet by mouth nightly as needed (allergies) Yes Ana Arango PA-C

## 2025-04-30 DIAGNOSIS — Z96.652 STATUS POST TOTAL KNEE REPLACEMENT, LEFT: ICD-10-CM

## 2025-04-30 RX ORDER — CEPHALEXIN 500 MG/1
CAPSULE ORAL
Qty: 4 CAPSULE | Refills: 0 | Status: SHIPPED | OUTPATIENT
Start: 2025-04-30 | End: 2025-06-16 | Stop reason: ALTCHOICE

## 2025-05-25 NOTE — TELEPHONE ENCOUNTER
LT TKR was 10/18/23    Patient states the blister has popped and was told to call in if this were to happen so that we could send in something for her.     Pharmacy: [id number] [facility], [address] [facility] There are no Wet Read(s) to document.

## 2025-05-29 ENCOUNTER — HOSPITAL ENCOUNTER (OUTPATIENT)
Facility: HOSPITAL | Age: 80
Discharge: HOME | End: 2025-05-29
Payer: MEDICARE

## 2025-05-29 ENCOUNTER — OFFICE VISIT (OUTPATIENT)
Facility: CLINIC | Age: 80
End: 2025-05-29
Payer: MEDICARE

## 2025-05-29 VITALS
OXYGEN SATURATION: 97 % | RESPIRATION RATE: 18 BRPM | WEIGHT: 177 LBS | TEMPERATURE: 97.9 F | BODY MASS INDEX: 29.45 KG/M2 | SYSTOLIC BLOOD PRESSURE: 135 MMHG | HEART RATE: 69 BPM | DIASTOLIC BLOOD PRESSURE: 78 MMHG

## 2025-05-29 DIAGNOSIS — R05.1 ACUTE COUGH: ICD-10-CM

## 2025-05-29 DIAGNOSIS — R05.1 ACUTE COUGH: Primary | ICD-10-CM

## 2025-05-29 PROCEDURE — 1159F MED LIST DOCD IN RCRD: CPT | Performed by: NURSE PRACTITIONER

## 2025-05-29 PROCEDURE — 1036F TOBACCO NON-USER: CPT | Performed by: NURSE PRACTITIONER

## 2025-05-29 PROCEDURE — G8419 CALC BMI OUT NRM PARAM NOF/U: HCPCS | Performed by: NURSE PRACTITIONER

## 2025-05-29 PROCEDURE — G8399 PT W/DXA RESULTS DOCUMENT: HCPCS | Performed by: NURSE PRACTITIONER

## 2025-05-29 PROCEDURE — 1126F AMNT PAIN NOTED NONE PRSNT: CPT | Performed by: NURSE PRACTITIONER

## 2025-05-29 PROCEDURE — 1090F PRES/ABSN URINE INCON ASSESS: CPT | Performed by: NURSE PRACTITIONER

## 2025-05-29 PROCEDURE — 71046 X-RAY EXAM CHEST 2 VIEWS: CPT

## 2025-05-29 PROCEDURE — 1123F ACP DISCUSS/DSCN MKR DOCD: CPT | Performed by: NURSE PRACTITIONER

## 2025-05-29 PROCEDURE — 1160F RVW MEDS BY RX/DR IN RCRD: CPT | Performed by: NURSE PRACTITIONER

## 2025-05-29 PROCEDURE — G8427 DOCREV CUR MEDS BY ELIG CLIN: HCPCS | Performed by: NURSE PRACTITIONER

## 2025-05-29 PROCEDURE — 99214 OFFICE O/P EST MOD 30 MIN: CPT | Performed by: NURSE PRACTITIONER

## 2025-05-29 RX ORDER — ALBUTEROL SULFATE 90 UG/1
2 INHALANT RESPIRATORY (INHALATION) EVERY 4 HOURS PRN
COMMUNITY
Start: 2025-05-22

## 2025-05-29 RX ORDER — DOXYCYCLINE 100 MG/1
100 CAPSULE ORAL
COMMUNITY
Start: 2025-05-22

## 2025-05-29 RX ORDER — BROMPHENIRAMINE MALEATE, PSEUDOEPHEDRINE HYDROCHLORIDE, AND DEXTROMETHORPHAN HYDROBROMIDE 2; 30; 10 MG/5ML; MG/5ML; MG/5ML
5 SYRUP ORAL 4 TIMES DAILY PRN
Qty: 1 EACH | Refills: 1 | Status: SHIPPED | OUTPATIENT
Start: 2025-05-29

## 2025-05-29 ASSESSMENT — ENCOUNTER SYMPTOMS
NAUSEA: 0
VOMITING: 0
ABDOMINAL PAIN: 0
SHORTNESS OF BREATH: 0
CHEST TIGHTNESS: 0
TROUBLE SWALLOWING: 0
COUGH: 1
EYE PAIN: 0
SORE THROAT: 0
COLOR CHANGE: 0
EYE DISCHARGE: 0
WHEEZING: 0
EYE REDNESS: 0

## 2025-05-29 NOTE — PROGRESS NOTES
Chief Complaint   Patient presents with    Other     C/O still coughing after going to urgent care on [facility] a week ago...finished cough medication but still taking ATB       /78 (BP Site: Right Upper Arm, Patient Position: Sitting)   Pulse 69   Temp 97.9 °F (36.6 °C) (Oral)   Resp 18   Wt 80.3 kg (177 lb)   SpO2 97%   BMI 29.45 kg/m²       \"Have you been to the ER, urgent care clinic since your last visit?  Hospitalized since your last visit?\"    Yes urgent care     “Have you seen or consulted any other health care providers outside our system since your last visit?”    Yes urgent care

## 2025-05-29 NOTE — PROGRESS NOTES
Subjective  Chief Complaint   Patient presents with    Other     [facility] [place] [facility]...finished cough medication but still taking ATB     HPI:  Whitney Cooley is a 79 y.o. female with medical history as reviewed and included.     Patient presents to the clinic for an acute care visit after presenting to the [facility] emergency department on 5/22/2025 for evaluation of worsening cough diagnosis of pneumonia, patient endorses symptoms initially began 4 weeks ago. In the emergency department patient was prescribed albuterol rescue inhaler, doxycycline, and benzonatate.  No chest x-ray obtained in the ED.  Lower respiratory tract infection, patient reports slight improvement however is concerned with lingering persistent cough.  Due to length of time of acute cough, ordering chest x-ray to evaluate for acute pulmonary processes.  Advised patient to continue taking montelukast and utilizing Flonase at home, continue for allergic rhinitis.  Patient reports Tessalon Perles did not alleviate symptoms, prescribing decongestant cough syrup. Advised patient to utilize OTC analgesics for additional symptom management as directed and increase oral fluids. Advise patient to continue medical surveillance and guidance of PCP.      Review of Systems   Constitutional:  Negative for chills, fatigue and fever.   HENT:  Negative for ear pain, hearing loss, sore throat and trouble swallowing.    Eyes:  Negative for pain, discharge and redness.   Respiratory:  Positive for cough. Negative for chest tightness, shortness of breath and wheezing.    Gastrointestinal:  Negative for abdominal pain, nausea and vomiting.   Endocrine: Negative for cold intolerance and heat intolerance.   Genitourinary:  Negative for dysuria and flank pain.   Skin:  Negative for color change and rash.   Neurological:  Negative for dizziness, seizures, weakness, light-headedness and headaches.

## 2025-05-30 ENCOUNTER — RESULTS FOLLOW-UP (OUTPATIENT)
Facility: CLINIC | Age: 80
End: 2025-05-30

## 2025-06-16 ENCOUNTER — OFFICE VISIT (OUTPATIENT)
Facility: CLINIC | Age: 80
End: 2025-06-16
Payer: MEDICARE

## 2025-06-16 VITALS
DIASTOLIC BLOOD PRESSURE: 74 MMHG | HEART RATE: 67 BPM | OXYGEN SATURATION: 97 % | BODY MASS INDEX: 29.49 KG/M2 | HEIGHT: 65 IN | TEMPERATURE: 98.2 F | WEIGHT: 177 LBS | SYSTOLIC BLOOD PRESSURE: 144 MMHG

## 2025-06-16 DIAGNOSIS — R05.1 ACUTE COUGH: ICD-10-CM

## 2025-06-16 DIAGNOSIS — J30.9 ALLERGIC RHINITIS, UNSPECIFIED SEASONALITY, UNSPECIFIED TRIGGER: Primary | ICD-10-CM

## 2025-06-16 PROCEDURE — 1123F ACP DISCUSS/DSCN MKR DOCD: CPT | Performed by: NURSE PRACTITIONER

## 2025-06-16 PROCEDURE — 1090F PRES/ABSN URINE INCON ASSESS: CPT | Performed by: NURSE PRACTITIONER

## 2025-06-16 PROCEDURE — G8427 DOCREV CUR MEDS BY ELIG CLIN: HCPCS | Performed by: NURSE PRACTITIONER

## 2025-06-16 PROCEDURE — G8419 CALC BMI OUT NRM PARAM NOF/U: HCPCS | Performed by: NURSE PRACTITIONER

## 2025-06-16 PROCEDURE — 1159F MED LIST DOCD IN RCRD: CPT | Performed by: NURSE PRACTITIONER

## 2025-06-16 PROCEDURE — 1036F TOBACCO NON-USER: CPT | Performed by: NURSE PRACTITIONER

## 2025-06-16 PROCEDURE — 99214 OFFICE O/P EST MOD 30 MIN: CPT | Performed by: NURSE PRACTITIONER

## 2025-06-16 PROCEDURE — 1160F RVW MEDS BY RX/DR IN RCRD: CPT | Performed by: NURSE PRACTITIONER

## 2025-06-16 PROCEDURE — G8399 PT W/DXA RESULTS DOCUMENT: HCPCS | Performed by: NURSE PRACTITIONER

## 2025-06-16 RX ORDER — LORATADINE AND PSEUDOEPHEDRINE SULFATE 5; 120 MG/1; MG/1
1 TABLET, EXTENDED RELEASE ORAL 2 TIMES DAILY
Qty: 60 TABLET | Refills: 1 | Status: SHIPPED | OUTPATIENT
Start: 2025-06-16 | End: 2025-08-15

## 2025-06-16 RX ORDER — ALBUTEROL SULFATE 90 UG/1
2 INHALANT RESPIRATORY (INHALATION) EVERY 4 HOURS PRN
Qty: 18 G | Refills: 3 | Status: SHIPPED | OUTPATIENT
Start: 2025-06-16

## 2025-06-16 ASSESSMENT — ENCOUNTER SYMPTOMS
EYE REDNESS: 0
WHEEZING: 0
COUGH: 1
ABDOMINAL PAIN: 0
RHINORRHEA: 1
NAUSEA: 0
TROUBLE SWALLOWING: 0
SORE THROAT: 0
EYE DISCHARGE: 0
CHEST TIGHTNESS: 0
VOMITING: 0
EYE PAIN: 0
COLOR CHANGE: 0
SHORTNESS OF BREATH: 0

## 2025-06-16 NOTE — PROGRESS NOTES
Chief Complaint   Patient presents with    Cough    BP (!) 144/74 (BP Site: Left Upper Arm, Patient Position: Sitting, BP Cuff Size: Medium Adult) Comment: recheck bp  Pulse 67   Temp 98.2 °F (36.8 °C) (Oral)   Ht 1.651 m (5' 5\")   Wt 80.3 kg (177 lb)   SpO2 97%   BMI 29.45 kg/m²    Have you been to the ER, urgent care clinic since your last visit?  Hospitalized since your last visit?   NO    Have you seen or consulted any other health care providers outside our system since your last visit?   NO

## 2025-06-16 NOTE — PROGRESS NOTES
Subjective  Chief Complaint   Patient presents with    Cough     HPI:  Whitney Cooley is a 79 y.o. female with medical history as reviewed and included.     Patient presents to the clinic for an acute care visit for evaluation of increased frequency of coughing fits. Patient previously evaluated for an acute care visit 5/29/2025 secondary to worsening cough with previous diagnosis 5/22/2025 of pneumonia and [facility] emergency department.    Patient describes previous upper respiratory infectious process cleared with medication regimen, however she noted sudden acute prolonged coughing episodes requiring her to use her inhaler.  Discussed with patient use of previously prescribed Flonase, with reports that she takes Flonase only as needed.  Patient describes worsening rhinitis and postnasal drip.  Patient also endorses previous use of allergy medication in the past did not show much improvement in allergic rhinitis symptoms.  Discussed with patient potential benefit of starting a schedule antihistamine in light of worsening symptoms, prescribing Claritin-D, advised patient to continue use of Singulair.  Refilling prescription for albuterol rescue inhaler.  Discussed with patient to consider use of air purifier, air humidifier and increasing frequency of changing home ventilation filters.  Referring patient to ENT for further evaluation including possible allergy testing and treatment recommendations as appropriate. Advise patient to continue medical surveillance and guidance of PCP. The patient's primary care provider will assume ongoing management of chronic conditions.      Review of Systems   Constitutional:  Negative for chills, fatigue and fever.   HENT:  Positive for postnasal drip and rhinorrhea. Negative for ear pain, hearing loss, sore throat and trouble swallowing.    Eyes:  Negative for pain, discharge and redness.   Respiratory:  Positive for cough. Negative for chest tightness, shortness of breath and

## 2025-08-04 ENCOUNTER — APPOINTMENT (OUTPATIENT)
Facility: HOSPITAL | Age: 80
End: 2025-08-04
Payer: MEDICARE

## 2025-08-04 ENCOUNTER — HOSPITAL ENCOUNTER (EMERGENCY)
Facility: HOSPITAL | Age: 80
Discharge: HOME | End: 2025-08-04
Attending: STUDENT IN AN ORGANIZED HEALTH CARE EDUCATION/TRAINING PROGRAM
Payer: MEDICARE

## 2025-08-04 VITALS
HEIGHT: 65 IN | RESPIRATION RATE: 18 BRPM | TEMPERATURE: 98.3 F | OXYGEN SATURATION: 96 % | WEIGHT: 175 LBS | SYSTOLIC BLOOD PRESSURE: 145 MMHG | BODY MASS INDEX: 29.16 KG/M2 | DIASTOLIC BLOOD PRESSURE: 75 MMHG | HEART RATE: 72 BPM

## 2025-08-04 DIAGNOSIS — S46.911A STRAIN OF RIGHT SHOULDER, INITIAL ENCOUNTER: Primary | ICD-10-CM

## 2025-08-04 LAB
ANION GAP SERPL CALC-SCNC: 6 MMOL/L (ref 2–12)
BASOPHILS # BLD: 0.04 K/UL (ref 0–0.1)
BASOPHILS NFR BLD: 0.7 % (ref 0–1)
BUN SERPL-MCNC: 20 MG/DL (ref 6–20)
BUN/CREAT SERPL: 29 (ref 12–20)
CA-I BLD-MCNC: 9.6 MG/DL (ref 8.5–10.1)
CHLORIDE SERPL-SCNC: 106 MMOL/L (ref 97–108)
CO2 SERPL-SCNC: 30 MMOL/L (ref 21–32)
CREAT SERPL-MCNC: 0.7 MG/DL (ref 0.55–1.02)
DIFFERENTIAL METHOD BLD: ABNORMAL
EKG ATRIAL RATE: 63 BPM
EKG DIAGNOSIS: NORMAL
EKG P AXIS: 43 DEGREES
EKG P-R INTERVAL: 180 MS
EKG Q-T INTERVAL: 416 MS
EKG QRS DURATION: 98 MS
EKG QTC CALCULATION (BAZETT): 425 MS
EKG R AXIS: -8 DEGREES
EKG T AXIS: 51 DEGREES
EKG VENTRICULAR RATE: 63 BPM
EOSINOPHIL # BLD: 0.23 K/UL (ref 0–0.4)
EOSINOPHIL NFR BLD: 4.1 % (ref 0–7)
ERYTHROCYTE [DISTWIDTH] IN BLOOD BY AUTOMATED COUNT: 14.7 % (ref 11.5–14.5)
GLUCOSE SERPL-MCNC: 109 MG/DL (ref 65–100)
HCT VFR BLD AUTO: 39.6 % (ref 35–47)
HGB BLD-MCNC: 12.3 G/DL (ref 11.5–16)
IMM GRANULOCYTES # BLD AUTO: 0.01 K/UL (ref 0–0.04)
IMM GRANULOCYTES NFR BLD AUTO: 0.2 % (ref 0–0.5)
LYMPHOCYTES # BLD: 1.11 K/UL (ref 0.8–3.5)
LYMPHOCYTES NFR BLD: 19.8 % (ref 12–49)
MCH RBC QN AUTO: 26 PG (ref 26–34)
MCHC RBC AUTO-ENTMCNC: 31.1 G/DL (ref 30–36.5)
MCV RBC AUTO: 83.7 FL (ref 80–99)
MONOCYTES # BLD: 0.41 K/UL (ref 0–1)
MONOCYTES NFR BLD: 7.3 % (ref 5–13)
NEUTS SEG # BLD: 3.81 K/UL (ref 1.8–8)
NEUTS SEG NFR BLD: 67.9 % (ref 32–75)
PLATELET # BLD AUTO: 235 K/UL (ref 150–400)
PMV BLD AUTO: 10.5 FL (ref 8.9–12.9)
POTASSIUM SERPL-SCNC: 4.1 MMOL/L (ref 3.5–5.1)
RBC # BLD AUTO: 4.73 M/UL (ref 3.8–5.2)
SODIUM SERPL-SCNC: 142 MMOL/L (ref 136–145)
TROPONIN I SERPL HS-MCNC: 6 NG/L (ref 0–51)
WBC # BLD AUTO: 5.6 K/UL (ref 3.6–11)

## 2025-08-04 PROCEDURE — 80048 BASIC METABOLIC PNL TOTAL CA: CPT

## 2025-08-04 PROCEDURE — 85025 COMPLETE CBC W/AUTO DIFF WBC: CPT

## 2025-08-04 PROCEDURE — 73030 X-RAY EXAM OF SHOULDER: CPT

## 2025-08-04 PROCEDURE — 36415 COLL VENOUS BLD VENIPUNCTURE: CPT

## 2025-08-04 PROCEDURE — 93005 ELECTROCARDIOGRAM TRACING: CPT | Performed by: STUDENT IN AN ORGANIZED HEALTH CARE EDUCATION/TRAINING PROGRAM

## 2025-08-04 PROCEDURE — 99285 EMERGENCY DEPT VISIT HI MDM: CPT

## 2025-08-04 PROCEDURE — 84484 ASSAY OF TROPONIN QUANT: CPT

## 2025-08-04 ASSESSMENT — PAIN - FUNCTIONAL ASSESSMENT: PAIN_FUNCTIONAL_ASSESSMENT: 0-10

## 2025-08-04 ASSESSMENT — PAIN DESCRIPTION - LOCATION: LOCATION: SHOULDER

## 2025-08-04 ASSESSMENT — PAIN DESCRIPTION - ORIENTATION: ORIENTATION: RIGHT

## 2025-08-04 ASSESSMENT — PAIN SCALES - GENERAL: PAINLEVEL_OUTOF10: 3

## 2025-09-04 DIAGNOSIS — Z86.39 HISTORY OF HIGH CHOLESTEROL: ICD-10-CM

## (undated) DEVICE — DRAPE,REIN 53X77,STERILE: Brand: MEDLINE

## (undated) DEVICE — STERILE POLYISOPRENE POWDER-FREE SURGICAL GLOVES: Brand: PROTEXIS

## (undated) DEVICE — LAPAROSCOPIC SCISSORS: Brand: EPIX LAPAROSCOPIC SCISSORS

## (undated) DEVICE — EVAC SMOKE SEECLEAR XCL -- SEE CLEAR

## (undated) DEVICE — SYRINGE IRRIG 60ML SFT PLIABLE BLB EZ TO GRP 1 HND USE W/

## (undated) DEVICE — 3M™ STERI-STRIP™ REINFORCED ADHESIVE SKIN CLOSURES, R1542, 1/4 IN X 1-1/2 IN (6 MM X 38 MM), 6 STRIPS/ENVELOPE: Brand: 3M™ STERI-STRIP™

## (undated) DEVICE — TROCAR: Brand: KII FIOS FIRST ENTRY

## (undated) DEVICE — SYR LR LCK 1ML GRAD NSAF 30ML --

## (undated) DEVICE — PREP SKN CHLRAPRP 26ML TNT -- CONVERT TO ITEM 373320

## (undated) DEVICE — TOWEL,OR,DSP,ST,BLUE,STD,4/PK,20PK/CS: Brand: MEDLINE

## (undated) DEVICE — SUTURE SZ 0 27IN 5/8 CIR UR-6  TAPER PT VIOLET ABSRB VICRYL J603H

## (undated) DEVICE — SOLUTION IRRIG 1000ML 09% SOD CHL USP PIC PLAS CONTAINER

## (undated) DEVICE — HYPODERMIC SAFETY NEEDLE: Brand: MAGELLAN

## (undated) DEVICE — YANKAUER,BULB TIP,W/O VENT,RIGID,STERILE: Brand: MEDLINE

## (undated) DEVICE — SUTURE VCRL SZ 3-0 L27IN ABSRB UD L26MM SH 1/2 CIR J416H

## (undated) DEVICE — MAJOR LAP PROCEDURE PACK: Brand: MEDLINE INDUSTRIES, INC.

## (undated) DEVICE — SYR 20ML LL STRL LF --

## (undated) DEVICE — THE ENDO CARRY-ON PROCEDURE KIT CONTAINS ALL OF THE SUPPLIES AND INFECTION PREVENTION PRODUCTS NEEDED FOR ENDOSCOPIC PROCEDURES: Brand: ENDO CARRY-ON PROCEDURE KIT

## (undated) DEVICE — TISSUE RETRIEVAL SYSTEM: Brand: INZII RETRIEVAL SYSTEM

## (undated) DEVICE — LINE SAMP LNG AD ORAL NSL PT O2 TBNG SMRT CAPNOLN H +

## (undated) DEVICE — SUTURE PERMAHAND SZ 0 L18IN NONABSORBABLE BLK SILK BRAID A186H

## (undated) DEVICE — E-Z CLEAN, PTFE COATED, ELECTROSURGICAL LAPAROSCOPIC ELECTRODE, L-HOOK, 33 CM., SINGLE-USE, FOR USE WITH HAND CONTROL PENCIL: Brand: MEGADYNE

## (undated) DEVICE — TROCARS: Brand: KII® BALLOON BLUNT TIP SYSTEM

## (undated) DEVICE — SOLUTION IRRIG 500ML 0.9% SOD CHL USP POUR PLAS BTL

## (undated) DEVICE — MASC TURNOVER KIT: Brand: MEDLINE INDUSTRIES, INC.

## (undated) DEVICE — INTENDED FOR TISSUE SEPARATION, AND OTHER PROCEDURES THAT REQUIRE A SHARP SURGICAL BLADE TO PUNCTURE OR CUT.: Brand: BARD-PARKER ® CARBON RIB-BACK BLADES

## (undated) DEVICE — TURNOVER KIT OR CUST CMB FLD GEN LF

## (undated) DEVICE — SUT VCRL + 0 27IN CT2 UD --

## (undated) DEVICE — PREP SKN CHLRAPRP APL 26ML STR --

## (undated) DEVICE — TOWEL SURG W17XL27IN STD BLU COT NONFENESTRATED PREWASHED

## (undated) DEVICE — LAPAROSCOPIC CHOLE PACK: Brand: MEDLINE INDUSTRIES, INC.

## (undated) DEVICE — SOUTHSIDE TURNOVER: Brand: MEDLINE INDUSTRIES, INC.

## (undated) DEVICE — 2, DISPOSABLE SUCTION/IRRIGATOR WITHOUT DISPOSABLE TIP: Brand: STRYKEFLOW

## (undated) DEVICE — SUT MONOCRYL PLUS UD 4-0 --

## (undated) DEVICE — [HIGH FLOW INSUFFLATOR,  DO NOT USE IF PACKAGE IS DAMAGED,  KEEP DRY,  KEEP AWAY FROM SUNLIGHT,  PROTECT FROM HEAT AND RADIOACTIVE SOURCES.]: Brand: PNEUMOSURE

## (undated) DEVICE — PAD,NON-ADHERENT,3X8,STERILE,LF,1/PK: Brand: MEDLINE

## (undated) DEVICE — Device: Brand: BALLOON3

## (undated) DEVICE — GOWN,SIRUS,POLYRNF,BRTHSLV,LG,30/CS: Brand: MEDLINE

## (undated) DEVICE — SUT SLK 2-0SH 30IN BLK --

## (undated) DEVICE — STAPLER INT DIA5MM 25 ABSRB STRP FIX DISP FOR HERN MESH

## (undated) DEVICE — MARKER SURG SKIN UTIL REGULAR/FINE 2 TIP W/ RUL AND 9 LBL

## (undated) DEVICE — ADHESIVE LIQ H2O INSOLUBLE 3 CC LO RISK N STN MASTISOL

## (undated) DEVICE — 3M™ STERI-STRIP™ REINFORCED ADHESIVE SKIN CLOSURES, R1547, 1/2 IN X 4 IN (12 MM X 100 MM), 6 STRIPS/ENVELOPE: Brand: 3M™ STERI-STRIP™

## (undated) DEVICE — BASIC SINGLE BASIN-LF: Brand: MEDLINE INDUSTRIES, INC.

## (undated) DEVICE — CLIP INT SM WIDE RED TI TRNSVRS GRV CHEVRON SHP W PRECIS

## (undated) DEVICE — GARMENT,MEDLINE,DVT,INT,CALF,MED, GEN2: Brand: MEDLINE

## (undated) DEVICE — SUTURE ABSORBABLE BRAIDED 5-0 FS-2 27 IN COAT UD VICRYL + VCP421H

## (undated) DEVICE — MARKER SKN REG TIP W/RULER -- STRL

## (undated) DEVICE — MASK 14X6.5MM O2 PVC ADLT ADPT -- 2 ENTRY PORT ELAS STRP NSE CL

## (undated) DEVICE — 3M™ SURGICAL CLIPPER WITH PIVOTING HEAD, 9660, 50/CASE: Brand: 3M™

## (undated) DEVICE — GLOVE SURG SZ 7.5 L11.73IN FNGR THK9.8MIL STRW LTX POLYMER

## (undated) DEVICE — TROCAR: Brand: KII SLEEVE

## (undated) DEVICE — 3M™ IOBAN™ 2 ANTIMICROBIAL INCISE DRAPE 6651EZ: Brand: IOBAN™ 2